# Patient Record
Sex: MALE | Race: WHITE | NOT HISPANIC OR LATINO | Employment: OTHER | URBAN - NONMETROPOLITAN AREA
[De-identification: names, ages, dates, MRNs, and addresses within clinical notes are randomized per-mention and may not be internally consistent; named-entity substitution may affect disease eponyms.]

---

## 2017-03-27 ENCOUNTER — HISTORY (OUTPATIENT)
Dept: FAMILY MEDICINE | Facility: OTHER | Age: 57
End: 2017-03-27

## 2017-03-27 ENCOUNTER — OFFICE VISIT - GICH (OUTPATIENT)
Dept: FAMILY MEDICINE | Facility: OTHER | Age: 57
End: 2017-03-27

## 2017-03-27 DIAGNOSIS — L91.8 OTHER HYPERTROPHIC DISORDERS OF THE SKIN: ICD-10-CM

## 2018-01-04 NOTE — PROGRESS NOTES
"Patient Information     Patient Name MRN Terry Burgos [id number] Male 1960      Progress Notes by Lambert Salcedo MD at 3/27/2017 11:15 AM     Author:  Lambert Salcedo MD Service:  (none) Author Type:  Physician     Filed:  3/27/2017 12:28 PM Encounter Date:  3/27/2017 Status:  Signed     :  Lambert Salcedo MD (Physician)            SUBJECTIVE:  56 y.o. male who presents for removal of the skin tag on his face. It's just below the left eye and it gets in the way of his vision when he gazes downward. I had seen him socially and he asked to come in and have it removed.    Additional Review of Systems: see HPI:      Past Medical History     Diagnosis  Date     ED (erectile dysfunction) 2013     Hyperlipemia 2013        No current outpatient prescriptions on file.     No current facility-administered medications for this visit.      Medications have been reviewed by me and are current to the best of my knowledge and ability.      Allergies as of 2017      (No Known Allergies)        OBJECTIVE:  Visit Vitals       /88     Ht 1.702 m (5' 7\")     Wt 82.1 kg (181 lb)     BMI 28.35 kg/m2     EXAM:  EXAM:  General Appearance: Pleasant, alert, appropriate appearance for age. No acute distress  Skin: There is a very small skin tag, about 1 mm at the base, a typical appearing skin tag, just below the left eye in his field of vision with downward gaze.        ASSESSMENT/PLAN:  Skin tag-patient desires removal. Informed consent was obtained. A timeout was done to assure that the appropriate patient and appropriate lesion was noted. The area with central triple prepped with surgical scrub and the skin tag easily snipped off with an iris scissors. The base was cauterized with silver nitrate. It was a typical skin tag and so no pathology was done. He is to keep it clean and dry and follow-up if he has any problems.  Lambert Salcedo MD ....................  3/27/2017   " 11:42 AM

## 2018-01-04 NOTE — NURSING NOTE
Patient Information     Patient Name MRN Sex Terry Huerta [id number] Male 1960      Nursing Note by Jade Cruz at 3/27/2017 11:15 AM     Author:  Jade Cruz Service:  (none) Author Type:  (none)     Filed:  3/27/2017 11:57 AM Encounter Date:  3/27/2017 Status:  Signed     :  Jade Cruz            Patient presents to the clinic today to have a skin tag removed.    Universal Protocol    A. Pre-procedure verification complete yes  1-relevant information / documentation available, reviewed and properly matched to the patient; 2-consent accurate and complete, 3-equipment and supplies available    B. Site marking complete Yes  Site marked if not in continuous attendance with patient    C. TIME OUT completed yes  Time Out was conducted just prior to starting procedure to verify the eight required elements: 1-patient identity, 2-consent accurate and complete, 3-position, 4-correct side/site marked (if applicable), 5-procedure, 6-relevant images / results properly labeled and displayed (if applicable), 7-antibiotics / irrigation fluids (if applicable), 8-safety precautions.     Jade Cruz ....................  3/27/2017   11:18 AM

## 2018-01-24 ENCOUNTER — DOCUMENTATION ONLY (OUTPATIENT)
Dept: FAMILY MEDICINE | Facility: OTHER | Age: 58
End: 2018-01-24

## 2018-01-25 VITALS
WEIGHT: 181 LBS | SYSTOLIC BLOOD PRESSURE: 124 MMHG | BODY MASS INDEX: 28.41 KG/M2 | HEIGHT: 67 IN | DIASTOLIC BLOOD PRESSURE: 88 MMHG

## 2019-03-15 DIAGNOSIS — M25.569 ACUTE KNEE PAIN, UNSPECIFIED LATERALITY: Primary | ICD-10-CM

## 2019-03-18 ENCOUNTER — HOSPITAL ENCOUNTER (OUTPATIENT)
Dept: PHYSICAL THERAPY | Facility: OTHER | Age: 59
Setting detail: THERAPIES SERIES
End: 2019-03-18
Attending: FAMILY MEDICINE
Payer: COMMERCIAL

## 2019-03-18 DIAGNOSIS — M25.569 ACUTE KNEE PAIN, UNSPECIFIED LATERALITY: ICD-10-CM

## 2019-03-18 PROCEDURE — 97033 APP MDLTY 1+IONTPHRSIS EA 15: CPT | Mod: GP | Performed by: PHYSICAL THERAPIST

## 2019-03-18 PROCEDURE — 97161 PT EVAL LOW COMPLEX 20 MIN: CPT | Mod: GP | Performed by: PHYSICAL THERAPIST

## 2019-03-19 ENCOUNTER — HOSPITAL ENCOUNTER (OUTPATIENT)
Dept: PHYSICAL THERAPY | Facility: OTHER | Age: 59
Setting detail: THERAPIES SERIES
End: 2019-03-19
Attending: FAMILY MEDICINE
Payer: COMMERCIAL

## 2019-03-19 PROCEDURE — 97033 APP MDLTY 1+IONTPHRSIS EA 15: CPT | Mod: GP | Performed by: PHYSICAL THERAPIST

## 2019-03-21 NOTE — ADDENDUM NOTE
Encounter addended by: Stephan Beavers, PT on: 3/21/2019 5:36 PM   Actions taken: Sign clinical note, Flowsheet accepted

## 2019-03-21 NOTE — PROGRESS NOTES
03/18/19 1600   General Information   Type of Visit Initial OP Ortho PT Evaluation   Start of Care Date 03/18/19   Referring Physician Dr. Dayne LAWRENCE   Patient/Family Goals Statement Patient is leaving in 3 days for a golf vacation.  Difficulty walking and golfing at this time.  Would like to decrease pain and improve tolerance to be able to enjoy his vacation.   Orders Evaluate and Treat   Orders Comment Iontophoresis   Date of Order 03/15/19   Medical Diagnosis Knee pain   Surgical/Medical history reviewed Yes  (Hx prior Meniscal injury.  Arthroscopy per patient report.  )   Weight-Bearing Status - LLE weight-bearing as tolerated   Weight-Bearing Status - RLE weight-bearing as tolerated   General Information Comments Patient reports slipping on ice in February 2019.  Twisted left knee and fell on his back.  Pain in his left knee since that injury.  Recent flare up of pain.  Patient attempting to increase his activity level in anticipation of doing a lot of walking on vacation.  He is going on a gold vacation and would like to play 36 holes of golf 5 days in a row.  Currently he is having to use a cane to walk first thing in the AM.  Improves slightly with moving around.    Presentation and Etiology   Pertinent history of current problem (include personal factors and/or comorbidities that impact the POC) low   Functional Limitations perform desired leisure / sports activities   Symptom Location Left medial knee - joint line, left medial pes anserine region, left HS insertion.   How/Where did it occur With a fall   Onset date of current episode/exacerbation 02/19/19   Chronicity New   Pain rating (0-10 point scale) Best (/10);Worst (/10)   Best (/10) 2   Worst (/10) 8   Pain quality A. Sharp;C. Aching   Frequency of pain/symptoms A. Constant   Pain/symptoms are: Worse in the morning   Pain/symptoms exacerbated by A. Sitting;B. Walking   Prior Level of Function   Prior Level of Function-Mobility No diff with  basketball, running, golf prior to the injury in February.   Fall Risk Screen   Fall screen completed by PT   Have you fallen 2 or more times in the past year? No   Have you fallen and had an injury in the past year? Yes   Is patient a fall risk? Yes;Department fall risk interventions implemented   Knee Objective Findings   Side (if bilateral, select both right and left) Left   Observation Antalgic gait - mild.  No AD during the day.  First thing in the AM moderate diff walking.     Gait/Locomotion Antalgic   Knee Flexibility Comments HS flexibility WFL   Lachmans Test neg   Anterior Drawer Test neg   Posterior Drawer Test neg   Varus Stress Test neg   Valgus Stress Test mild pain - stable   Rosas's Test mildly positive   Palpation Medial joint line tenderness, mild effusion, moderate pain over MCL and medial HS insertion.   Left Knee Extension PROM full but painful   Left Knee Flexion PROM WFL - tight at end range   Planned Therapy Interventions   Planned Therapy Interventions manual therapy;gait training;joint mobilization;neuromuscular re-education;ROM;strengthening;stretching;balance training   Planned Modality Interventions   Planned Modality Interventions Cryotherapy;Electrical stimulation;Hot packs;Iontophoresis;Ultrasound   Planned Modality Interventions Comments Iontophoresis with Dexamethasone 4%   Clinical Impression   Criteria for Skilled Therapeutic Interventions Met yes, treatment indicated   PT Diagnosis Left knee pain - possible meniscal injury.  Secondary pain/strain MCL and possible hamstring tendonitis.     Functional limitations due to impairments Prolonged sitting with knee in flexed position, prolonged walking, golf.   Clinical Presentation Evolving/Changing   Clinical Presentation Rationale Clinical judgement   Clinical Decision Making (Complexity) Low complexity   Therapy Frequency 2 times/Week   Predicted Duration of Therapy Intervention (days/wks) 4 weeks   Risk & Benefits of therapy  have been explained Yes   Patient, Family & other staff in agreement with plan of care Yes   Ortho Goal 1   Goal Identifier Pain with walking   Goal Description Decrease pain to less then 2/10 walking 60 minutes with exercise   Target Date 04/15/19   Ortho Goal 2   Goal Identifier Pain with golf   Goal Description Return to 18 holes of golf without limitation or pain.   Target Date 04/15/19   Ortho Goal 3   Goal Identifier HEP   Goal Description Indep with HEP with no soreness   Target Date 04/15/19   Total Evaluation Time   PT Eval, Low Complexity Minutes (51226) 30

## 2019-03-22 NOTE — PROGRESS NOTES
[facility]          OUTPATIENT PHYSICAL THERAPY ORTHOPEDIC EVALUATION  PLAN OF TREATMENT FOR OUTPATIENT REHABILITATION  (COMPLETE FOR INITIAL CLAIMS ONLY)  Patient's Last Name, First Name, M.I.  YOB: 1960  Terry Kemp    Provider s Name:  [facility]   Medical Record No.  [id number]   Start of Care Date:  03/18/19   Onset Date:  02/19/19   Type:     _X__PT   ___OT   ___SLP Medical Diagnosis:     Left knee pain   PT Diagnosis:  Left knee pain - possible meniscal injury.  Secondary pain/strain MCL and possible hamstring tendonitis.     Visits from SOC:  1      _________________________________________________________________________________  Plan of Treatment/Functional Goals:  manual therapy, gait training, joint mobilization, neuromuscular re-education, ROM, strengthening, stretching, balance training     Cryotherapy, Electrical stimulation, Hot packs, Iontophoresis, Ultrasound  Iontophoresis with Dexamethasone 4%    Goals  Goal Identifier: Pain with walking  Goal Description: Decrease pain to less then 2/10 walking 60 minutes with exercise  Target Date: 04/15/19    Goal Identifier: Pain with golf  Goal Description: Return to 18 holes of golf without limitation or pain.  Target Date: 04/15/19    Goal Identifier: HEP  Goal Description: Indep with HEP with no soreness  Target Date: 04/15/19                              Therapy Frequency:  2 times/Week  Predicted Duration of Therapy Intervention:  4 weeks    Stephan Beavers, PT                 I CERTIFY THE NEED FOR THESE SERVICES FURNISHED UNDER        THIS PLAN OF TREATMENT AND WHILE UNDER MY CARE     (Physician co-signature of this document indicates review and certification of the therapy plan).                  Referring Provider:  Dr. Dayne LAWRENCE    Initial Assessment        See Epic Evaluation Start of Care Date: 03/18/19

## 2019-03-22 NOTE — ADDENDUM NOTE
Encounter addended by: Stephan Beavers, PT on: 3/22/2019 12:14 PM   Actions taken: Episode edited, Flowsheet data copied forward, Flowsheet accepted, Charge Capture section accepted

## 2019-03-22 NOTE — ADDENDUM NOTE
Encounter addended by: Stephan Beavers, PT on: 3/22/2019 12:17 PM   Actions taken: Sign clinical note, Flowsheet accepted

## 2019-03-28 ENCOUNTER — HOSPITAL ENCOUNTER (OUTPATIENT)
Dept: PHYSICAL THERAPY | Facility: OTHER | Age: 59
Setting detail: THERAPIES SERIES
End: 2019-03-28
Attending: FAMILY MEDICINE
Payer: COMMERCIAL

## 2019-03-28 PROCEDURE — 97110 THERAPEUTIC EXERCISES: CPT | Mod: GP | Performed by: PHYSICAL THERAPIST

## 2019-03-28 PROCEDURE — 97035 APP MDLTY 1+ULTRASOUND EA 15: CPT | Mod: GP | Performed by: PHYSICAL THERAPIST

## 2019-03-28 PROCEDURE — 97033 APP MDLTY 1+IONTPHRSIS EA 15: CPT | Mod: GP | Performed by: PHYSICAL THERAPIST

## 2019-03-28 NOTE — ADDENDUM NOTE
Encounter addended by: Stephan Beavers, PT on: 3/28/2019 9:04 AM   Actions taken: Charge Capture section accepted

## 2019-04-01 ENCOUNTER — HOSPITAL ENCOUNTER (OUTPATIENT)
Dept: PHYSICAL THERAPY | Facility: OTHER | Age: 59
Setting detail: THERAPIES SERIES
End: 2019-04-01
Attending: FAMILY MEDICINE
Payer: COMMERCIAL

## 2019-04-01 PROCEDURE — 97033 APP MDLTY 1+IONTPHRSIS EA 15: CPT | Mod: GP | Performed by: PHYSICAL THERAPIST

## 2019-04-01 PROCEDURE — 97035 APP MDLTY 1+ULTRASOUND EA 15: CPT | Mod: GP | Performed by: PHYSICAL THERAPIST

## 2019-04-08 ENCOUNTER — TELEPHONE (OUTPATIENT)
Dept: FAMILY MEDICINE | Facility: OTHER | Age: 59
End: 2019-04-08

## 2019-04-08 ENCOUNTER — HOSPITAL ENCOUNTER (OUTPATIENT)
Dept: PHYSICAL THERAPY | Facility: OTHER | Age: 59
Setting detail: THERAPIES SERIES
End: 2019-04-08
Attending: FAMILY MEDICINE
Payer: COMMERCIAL

## 2019-04-08 DIAGNOSIS — M25.562 CHRONIC PAIN OF LEFT KNEE: Primary | ICD-10-CM

## 2019-04-08 DIAGNOSIS — G89.29 CHRONIC PAIN OF LEFT KNEE: Primary | ICD-10-CM

## 2019-04-08 PROCEDURE — 97033 APP MDLTY 1+IONTPHRSIS EA 15: CPT | Mod: GP | Performed by: PHYSICAL THERAPIST

## 2019-04-08 PROCEDURE — 97035 APP MDLTY 1+ULTRASOUND EA 15: CPT | Mod: GP | Performed by: PHYSICAL THERAPIST

## 2019-04-08 NOTE — TELEPHONE ENCOUNTER
I have been working with Terry in PT regarding his knee injury.  He is not progressing and would like to see you for an evaluation.  He would like to move forward with a MRI if you feel it is appropriate.  He is a very active individual who typically plays golf daily in the summer.  He is concerned he will not be able to play golf, run or play basketball this summer.      I have an updated note in his chart for your reference.  Let me know if you have questions.    Stephan Beavers PT

## 2019-04-08 NOTE — PROGRESS NOTES
Outpatient Physical Therapy Progress Note     Patient: Terry Kemp  : 1960    Beginning/End Dates of Reporting Period:  3/18/19 to 2019     Referring Provider: Dr. Dayne LAWRENCE    Therapy Diagnosis: Left knee pain, possible meniscal injury.       Client Self Report: Patient reports doing a lot of yardwork this past weekend and today has moderate to severe pain in the medial left knee.  Mild swelling observed.  Patient did have relief with PT treatment last week, but he reports his pain is now back to baseline.  He would like to pursue a MRI to find out if he has a cartilage tear.  He does have a mechanism of injury where he attempted to stand from a squat position and felt/heard a pop.  Original injury was during the winter when he slipped and fell twisting his knee.      Objective Measurements:     Difficulty fully extending knee when supine.  Lacks 5 degrees extension.  Mild loss of end range flexion.   Difficulty tolerating strengthening due to exacerbation of pain       Goals:  Goal Identifier Pain with walking   Goal Description Decrease pain to less then 2/10 walking 60 minutes with exercise   Target Date 04/15/19   Date Met      Progress: Not progressing     Goal Identifier Pain with golf   Goal Description Return to 18 holes of golf without limitation or pain.   Target Date 04/15/19   Date Met      Progress: Not progressing     Goal Identifier HEP   Goal Description Indep with HEP with no soreness   Target Date 04/15/19   Date Met      Progress: Not progressing         Progress Toward Goals:   Progress this reporting period: Patient not progressing with recent exacerbation of pain.        Plan:  Continue therapy per current plan of care.    Discharge:  No

## 2019-04-09 NOTE — TELEPHONE ENCOUNTER
I ordered an MRI of his knee.  Lets plan on seeing him a couple days after his MRI to reexamine him and review his MRI results.

## 2019-04-11 ENCOUNTER — HOSPITAL ENCOUNTER (OUTPATIENT)
Dept: PHYSICAL THERAPY | Facility: OTHER | Age: 59
Setting detail: THERAPIES SERIES
End: 2019-04-11
Attending: PSYCHIATRY & NEUROLOGY
Payer: COMMERCIAL

## 2019-04-11 PROCEDURE — 97033 APP MDLTY 1+IONTPHRSIS EA 15: CPT | Mod: GP | Performed by: PHYSICAL THERAPIST

## 2019-04-11 PROCEDURE — 97035 APP MDLTY 1+ULTRASOUND EA 15: CPT | Mod: GP | Performed by: PHYSICAL THERAPIST

## 2019-04-24 ENCOUNTER — TELEPHONE (OUTPATIENT)
Dept: FAMILY MEDICINE | Facility: OTHER | Age: 59
End: 2019-04-24

## 2019-04-24 NOTE — TELEPHONE ENCOUNTER
Patient has questions regarding scheduling an MRI. Please call and explain the procedure required to get an xray and MRI placed. Patient not sure if insurance will cover.     Tania Banda on 4/24/2019 at 2:28 PM

## 2019-04-24 NOTE — TELEPHONE ENCOUNTER
Yes, he needs to have a visit with a provider. An XRAY can be done at the time of that visit. Since he is already doing physical therapy, presumably insurance will then cover an MRI.     It looks like Dr. Montelongo ordered an MRI of his knee already. He sees a lot of sports related injuries so would be a good physician for him to see for this problem.     The most efficient use of his time would be to do this:    Get an appointment this week or next with an available provider (NP or PA would be appropriate). XR can be ordered and done at that visit.     MRI could then be scheduled. Would recommend scheduling a follow up appointment with Dr. Montelongo to review these results.

## 2019-04-24 NOTE — TELEPHONE ENCOUNTER
Spoke to patient.  He states that he has been dealing with soft tissue damage of his left knee and would like an MRI for that.  He states that insurance won't cover it unless he appeals it with physical therapy and an x-ray.  He states for the appeal he would need to document evidence of an x-ray and physical therapy.  He states that he has never seen Leah Oscar MD before and patient wonders if he should schedule an appointment for his knee pain before he deals with the x-ray, MRI and insurance.  Priya Preciado LPN 4/24/2019   2:51 PM

## 2019-04-24 NOTE — TELEPHONE ENCOUNTER
Patient notified of this and transferred to scheduling.  He states that he was told that MRI was not covered by insurance.  Spoke with [employer] and they will re-run the authorization as the previous one did not include patient's physical therapy.  They will contact patient for this.  SEPIDEH Olivia........................4/24/2019  3:57 PM

## 2019-05-08 ENCOUNTER — NURSE TRIAGE (OUTPATIENT)
Dept: FAMILY MEDICINE | Facility: OTHER | Age: 59
End: 2019-05-08
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
## 2019-05-08 RX ORDER — DOXYCYCLINE HYCLATE 100 MG
200 TABLET ORAL ONCE
Qty: 2 TABLET | Refills: 0 | Status: SHIPPED | OUTPATIENT
Start: 2019-05-08 | End: 2019-05-29

## 2019-05-08 NOTE — TELEPHONE ENCOUNTER
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    Pt states he received first tick bite on left side of chest about 10 days ago and states that he was not able to get all pieces of the tick removed as was embedded.  Pt states this bite is healing with no redness present.  Pt noted he attempted to pick off another suspected deer tick from his right bicep area this morning and believes he was not able to remove the tick in its entirety.  Pt believes both ticks were deer ticks.      Pt denies fever, headache, rash, redness, drainage.      Pt confirmed no known allergies and states he would use [facility] if needed.    Will route to PCP to determine if should/can treat prophylactically with antibiotics.     Reason for Disposition    Can't remove tick's head that was broken off in the skin (after trying Care Advice)    Protocols used: TICK BITE-MARCIA-    Candy Cordero RN  ....................  5/8/2019   12:38 PM

## 2019-05-08 NOTE — TELEPHONE ENCOUNTER
Pt states that he has had 2 deer tick embedded in his arm in the last 2 days.  He would like to get a prescription.

## 2019-05-08 NOTE — TELEPHONE ENCOUNTER
Would recommend a 1 time prophylactic dose of doxycycline. Monitor for sx. Usually sx occur within 10-30 days. Monitor for rash, fever, joint pain. If these occurs, pt needs to be seen. Prescription sent to pharmacy. Leah Oscar MD

## 2019-05-29 ENCOUNTER — OFFICE VISIT (OUTPATIENT)
Dept: FAMILY MEDICINE | Facility: OTHER | Age: 59
End: 2019-05-29
Attending: FAMILY MEDICINE
Payer: COMMERCIAL

## 2019-05-29 ENCOUNTER — HOSPITAL ENCOUNTER (OUTPATIENT)
Dept: GENERAL RADIOLOGY | Facility: OTHER | Age: 59
Discharge: HOME | End: 2019-05-29
Attending: FAMILY MEDICINE | Admitting: FAMILY MEDICINE
Payer: COMMERCIAL

## 2019-05-29 VITALS
DIASTOLIC BLOOD PRESSURE: 74 MMHG | WEIGHT: 172.2 LBS | HEIGHT: 67 IN | HEART RATE: 72 BPM | TEMPERATURE: 96.6 F | RESPIRATION RATE: 16 BRPM | SYSTOLIC BLOOD PRESSURE: 126 MMHG | BODY MASS INDEX: 27.03 KG/M2

## 2019-05-29 DIAGNOSIS — M25.562 ACUTE PAIN OF LEFT KNEE: Primary | ICD-10-CM

## 2019-05-29 DIAGNOSIS — M25.562 ACUTE PAIN OF LEFT KNEE: ICD-10-CM

## 2019-05-29 DIAGNOSIS — Z00.00 HEALTH CARE MAINTENANCE: ICD-10-CM

## 2019-05-29 LAB
CHOLEST SERPL-MCNC: 260 MG/DL
GLUCOSE SERPL-MCNC: 83 MG/DL (ref 70–105)
HDLC SERPL-MCNC: 61 MG/DL (ref 23–92)
LDLC SERPL CALC-MCNC: 191 MG/DL
NONHDLC SERPL-MCNC: 199 MG/DL
PSA SERPL-ACNC: 1.4 NG/ML
TRIGL SERPL-MCNC: 41 MG/DL

## 2019-05-29 PROCEDURE — 80061 LIPID PANEL: CPT | Mod: ZL | Performed by: FAMILY MEDICINE

## 2019-05-29 PROCEDURE — 99396 PREV VISIT EST AGE 40-64: CPT | Performed by: FAMILY MEDICINE

## 2019-05-29 PROCEDURE — G0103 PSA SCREENING: HCPCS | Mod: ZL | Performed by: FAMILY MEDICINE

## 2019-05-29 PROCEDURE — 73562 X-RAY EXAM OF KNEE 3: CPT | Mod: LT

## 2019-05-29 PROCEDURE — 36415 COLL VENOUS BLD VENIPUNCTURE: CPT | Mod: ZL | Performed by: FAMILY MEDICINE

## 2019-05-29 PROCEDURE — 82947 ASSAY GLUCOSE BLOOD QUANT: CPT | Mod: ZL | Performed by: FAMILY MEDICINE

## 2019-05-29 ASSESSMENT — MIFFLIN-ST. JEOR: SCORE: 1549.72

## 2019-05-29 NOTE — NURSING NOTE
"Patient here for yearly physical.   Zhanna Frost LPN ..........5/29/2019 1:48 PM   Chief Complaint   Patient presents with     Physical     yearly       Initial /74 (BP Location: Right arm, Patient Position: Sitting, Cuff Size: Adult Regular)   Pulse 72   Temp 96.6  F (35.9  C) (Tympanic)   Resp 16   Ht 1.702 m (5' 7\")   Wt 78.1 kg (172 lb 3.2 oz)   BMI 26.97 kg/m   Estimated body mass index is 26.97 kg/m  as calculated from the following:    Height as of this encounter: 1.702 m (5' 7\").    Weight as of this encounter: 78.1 kg (172 lb 3.2 oz).  Medication Reconciliation: complete    Zhanna Frost LPN    "

## 2019-05-29 NOTE — PATIENT INSTRUCTIONS
Will get your name on the waiting list for shingrix.   Labs today, will send a letter with results.   Call your insurance to find out if Cologuard is covered. Let us know and we will send in the paperwork.   MRI knee planned with ortho consult. (do consult AFTER MRI results available)  If surgery needed, plan for in fall. Consider a steroid injection.

## 2019-05-29 NOTE — PROGRESS NOTES
SUBJECTIVE:   Terry Kemp is a 58 year old male who presents to clinic today to establish care and for a physical.    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  He slipped and fell on the ice, not specifically hurting his knee.  But 2 days later he developed bilateral knee pain.  Eventually his right knee improved, but he continued to have severe left knee pain.  Because he was favoring this knee, he thinks he reinjured his right knee.  He has been doing physical therapy with some improvement.  But he had gotten to the point of wanting to do an MRI of his left knee.  However, there were insurance issues and this MRI has not yet been done.    He is unwilling to do surgery if indicated during the summer months.  But does not feel that he is going to get much more improvement with physical therapy.  He is an avid golfer, and very active.  He is anxious to be able to return to his normal activity.      Colonoscopy in 2009, no first-degree relatives with colon cancer or precancerous polyps.    Patient has not previously had labs done through the clinic.  He did have lipids drawn in 2012 for life insurance policy.  No previous PSA testing.    Patient Active Problem List    Diagnosis Date Noted     Hyperlipemia 06/11/2013     Priority: Medium     Synovial cyst 06/11/2013     Priority: Medium     Past Medical History:   Diagnosis Date     Hyperlipidemia     6/11/2013     Male erectile dysfunction     6/11/2013      Past Surgical History:   Procedure Laterality Date     ARTHROSCOPY SHOULDER      rt shoulder x two     COLONOSCOPY      2009,normal     EXCISE CYST GENERIC (LOCATION)      7/8/13,mucous cyst excision, right index finger. Dr. Bajwa     KNEE SURGERY      arthroscopy       OTHER SURGICAL HISTORY      7/8/13,37597.0,GA EXC SKIN MALIG < 0.5 CM FACE EARS EYELIDS,Serleth, left eye     Family History   Problem Relation Age of Onset     Coronary Artery Disease Mother       "Lung Cancer Father         smoker     No Known Problems Sister      No Known Problems Brother      Colitis Half-Brother         colostomy bag     Social History     Tobacco Use     Smoking status: Never Smoker     Smokeless tobacco: Never Used   Substance Use Topics     Alcohol use: Yes     Alcohol/week: 0.0 oz     Social History     Social History Narrative     from [place]. In a long term relationship with Aisha Khan.     Son: Jesus.     Daughter: Abeba,  due to suicide.     Granddaughter born 2018.     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     Active in suicide prevention efforts.          Review of Systems see HPI, ROS otherwise negative.     OBJECTIVE:     /74 (BP Location: Right arm, Patient Position: Sitting, Cuff Size: Adult Regular)   Pulse 72   Temp 96.6  F (35.9  C) (Tympanic)   Resp 16   Ht 1.702 m (5' 7\")   Wt 78.1 kg (172 lb 3.2 oz)   BMI 26.97 kg/m    Body mass index is 26.97 kg/m .  Physical Exam   Constitutional: He is oriented to person, place, and time. He appears well-developed and well-nourished.   HENT:   TMs appear normal.    Eyes: Conjunctivae are normal.   Neck: No thyromegaly present.   Cardiovascular: Normal rate, regular rhythm and normal heart sounds.   No murmur heard.  Pulmonary/Chest: Effort normal and breath sounds normal. No respiratory distress.   Abdominal: Soft.   Musculoskeletal: He exhibits no edema.   Lymphadenopathy:     He has no cervical adenopathy.   Neurological: He is alert and oriented to person, place, and time.   Skin: No rash noted.   Psychiatric: He has a normal mood and affect.         ASSESSMENT/PLAN:         ICD-10-CM    1. Acute pain of left knee M25.562 XR Knee Left 3 Views     ORTHOPEDICS ADULT REFERRAL   2. Health care maintenance Z00.00 Lipid Panel     PSA Screen GH     Glucose     Glucose     PSA Screen GH     Lipid Panel     Labs today.  Cologuard paperwork signed.  Patient will look into insurance coverage.    Relevant " cancer screening discussed.    Counseled on healthy diet, Calcium and vitamin D intake, and exercise.    Patient Instructions   Will get your name on the waiting list for shingrix.   Labs today, will send a letter with results.   Call your insurance to find out if Cologuard is covered. Let us know and we will send in the paperwork.   MRI knee planned with ortho consult. (do consult AFTER MRI results available)  If surgery needed, plan for in fall. Consider a steroid injection.         Leah Oscar MD  [facility]

## 2019-05-29 NOTE — LETTER
May 31, 2019      Terry Hairuer  [address] [facility] [address]        Dear Terry,     Please see the copy of your lab results below:    Resulted Orders   Glucose   Result Value Ref Range    Glucose 83 70 - 105 mg/dL   PSA Screen GH   Result Value Ref Range    PSA Screen 1.401 <3.100 ng/mL   Lipid Panel   Result Value Ref Range    Cholesterol 260 (H) <200 mg/dL    Triglycerides 41 <150 mg/dL    HDL Cholesterol 61 23 - 92 mg/dL    LDL Cholesterol Calculated 191 (H) <100 mg/dL      Comment:      Above desirable:  100-129 mg/dl  Borderline High:  130-159 mg/dL  High:             160-189 mg/dL  Very high:       >189 mg/dl      Non HDL Cholesterol 199 (H) <130 mg/dL      Comment:      Above Desirable:  130-159 mg/dl  Borderline high:  160-189 mg/dl  High:             190-219 mg/dl  Very high:       >219 mg/dl       Your cholesterol is approaching where we would typically recommend starting a cholesterol lowering medication to decrease your risk of a heart attack or stroke. There are always dietary changes that could be made to help address cholesterol. In general, eating more of a plant based diet can be very helpful.     The CHIP (coronary health improvement project) Program is a locally offered series of classes that some of my patients have found very helpful in guiding these lifestyle changes.     If you would like to start a statin medication, let me know and I will send an prescription into your pharmacy. Otherwise, we should plan on repeating your cholesterol in a year.       The knee xray does show arthritis, but nothing else to explain your current symptoms. We will wait for the MRI to give us more information.     Please don't hesitate to call if you have concerns or questions.       Sincerely,        Leah Oscar MD

## 2019-06-07 ENCOUNTER — TELEPHONE (OUTPATIENT)
Dept: FAMILY MEDICINE | Facility: OTHER | Age: 59
End: 2019-06-07

## 2019-06-07 DIAGNOSIS — E78.2 MIXED HYPERLIPIDEMIA: Primary | ICD-10-CM

## 2019-06-07 NOTE — TELEPHONE ENCOUNTER
Left message MD is out of the clinic today, will forward message to MD for Monday.   Zhanna Frost LPN ..........6/7/2019 11:29 AM

## 2019-06-10 RX ORDER — ATORVASTATIN CALCIUM 20 MG/1
20 TABLET, FILM COATED ORAL DAILY
Qty: 30 TABLET | Refills: 3 | Status: SHIPPED | OUTPATIENT
Start: 2019-06-10 | End: 2019-10-11

## 2019-06-10 NOTE — TELEPHONE ENCOUNTER
Please contact the patient. Prescription sent to [facility]. He should have labs rechecked in 6 weeks, fasting.  He can schedule a lab only appointment.  Ideally, he may need to be on a higher dose of Lipitor.  But I generally like to start with a smaller dose.  Once we settle on a dose, I will switch it to a 90-day supply.

## 2019-06-10 NOTE — TELEPHONE ENCOUNTER
Patient contacted and informed of prescription and follow up labs. Patient verbalized understanding and states that he will call back to schedule lab only appointment.     Jessica Parmar LPN on 6/10/2019 at 9:18 AM

## 2019-06-18 ENCOUNTER — ALLIED HEALTH/NURSE VISIT (OUTPATIENT)
Dept: FAMILY MEDICINE | Facility: OTHER | Age: 59
End: 2019-06-18
Attending: FAMILY MEDICINE
Payer: COMMERCIAL

## 2019-06-18 DIAGNOSIS — Z23 NEED FOR VACCINATION: Primary | ICD-10-CM

## 2019-06-18 PROCEDURE — 90471 IMMUNIZATION ADMIN: CPT

## 2019-06-18 PROCEDURE — 90750 HZV VACC RECOMBINANT IM: CPT

## 2019-06-18 NOTE — PROGRESS NOTES
Pt denies allergies to yeast gelatin neosporin eggs thimerasol or latex or past reactions to vaccinations. Verified name and date of birth  Copy of MIIC given. Pt instructed to wait 15 min post injection in lobby and to report any reactions to nursing.  Authorized through [facility] for receiving in the clinic setting .   Zhanna Wood RN on 6/18/2019 at 10:24 AM

## 2019-06-25 ENCOUNTER — OFFICE VISIT (OUTPATIENT)
Dept: ORTHOPEDICS | Facility: OTHER | Age: 59
End: 2019-06-25
Attending: FAMILY MEDICINE
Payer: COMMERCIAL

## 2019-06-25 DIAGNOSIS — M25.562 ACUTE PAIN OF LEFT KNEE: ICD-10-CM

## 2019-06-25 PROCEDURE — G0463 HOSPITAL OUTPT CLINIC VISIT: HCPCS | Performed by: ORTHOPAEDIC SURGERY

## 2019-07-01 NOTE — PROGRESS NOTES
VITALS:   Height:   67  Weight:   172  Pulse rate:  72  Blood Pressure:  126 / 74      CHIEF COMPLAINT: Bilateral Knee Pain     PROBLEMS:  FH LUNG CANCER (ICD-V16.1) ([id number])    PATIENT REPORTED MEDICATIONS:  LIPITOR TABLET (ATORVASTATIN CALCIUM TABS)     Medications were reviewed with patient this visit.    PATIENT REPORTED ALLERGIES:   Patient has no noted allergies.      HISTORY OF PRESENT ILLNESS:    REASON FOR EVALUATION:  Bilateral knee pain.     HISTORY OF PRESENT ILLNESS:  Terry comes in back in January he had slipped, kind of hyperextended his knee.  Since that time has had pain that was just not relenting here for the past five months or so.  He is actually starting to feel better at this time.  The left knee is a little bit more bothersome than the right.  Pain is centrally in nature, as well as across the medial patellofemoral ligament.  Also a little bit of discomfort in the back.  Had x-rays done, no significant advancement of arthritic changes were noted.  He has had prior scope done by myself with reasonable success.  This does not feel as much meniscal related at this point in time.  Does have a little bit of pain with twisting and rotation, but does seem like it is feeling better.  He is avid golfer and golfing has not seemed to have been too much of a bother for him.  Just wanted to have things evaluated.      PAST MEDICAL HISTORY:  The patient's health history form dated 06/25/19 was reviewed and signed.  Past medical history, medications, allergies, surgical history, social history, family history, and review of systems noted and scanned into EMR.  ALLERGIES:  No known medication allergies.     PAST MEDICAL HISTORY:    Unremarkable    PAST ORTHOPEDIC SURGICAL HISTORY:    Left Knee Arthroscopy, Meniscectomy, Chondroplasty 04/01/13  Right Shoulder Surgery x2    PAST SURGICAL HISTORY:    Reviewed     FAMILY HISTORY:     Heart Disease  FH Lung Cancer    SOCIAL HISTORY:   Single  Patient has  "never smoked.   Passive smoke exposure - no  Alcohol Use - yes  Drug Use - no  HIV/High Risk - no    PHYSICAL EXAMINATION:    On physical exam, the patient's height 5'7\", weight 172 pounds, pulse 72, blood pressure 126/74.  He is alert and oriented x3, cooperative on my exam, in no acute distress.  Does ambulate with some mild gait antalgia.  Affect is appropriate.  Examination of both knees shows range of motion 0-125.  Tenderness across the medial joint line to some extent.  He is not excessively painful with Rosas testing.  Lachman testing is stable on both knees at this point, as well.  Kneecap tracking is acceptable, as well.  Quad strength is otherwise 5/5.  Scope incisions from prior left knee operation are well-healed at this point in time.  No associated pain with hip range of motion or straight leg raise otherwise.      X-RAY:  X-rays are reviewed, mild arthritic changes are seen medial compartment, as well as patellofemoral joint.      ASSESSMENT:    IMPRESSION:  Bilateral knee strain status post fall with mild underlying arthrosis.     PLAN:   The patient was reassured.  Clinical exam certainly looks quite sound.  I do not suspect any meniscal pathology.  At this point I would advise he does continue to work through quad exercise regimen especially VMOs.  If symptoms do not rob consideration for MRI evaluation of the knee that is effecting him the most would certainly be warranted and considered.      Dictated by:  Rhett Stevens MD  Copy to:  Lambert Salcedo MD     D:  06/25/19  T:  06/27/19    Typed and/or reviewed and corrected by signing  below, and sent to the Physician for final review and signature.      This report was created using voice recording software and computer-generated templates. Although every effort has been made to review for and eliminate errors, some errors may still occur.         Electronically signed by Verito Dowell on 06/28/2019 at " 4:36 PM    Electronically signed by Rhett Stevens MD on 07/01/2019 at 8:32 AM  ________________________________________________________________________

## 2019-07-15 ENCOUNTER — HOSPITAL ENCOUNTER (OUTPATIENT)
Dept: MRI IMAGING | Facility: OTHER | Age: 59
Discharge: HOME | End: 2019-07-15
Attending: FAMILY MEDICINE | Admitting: FAMILY MEDICINE
Payer: COMMERCIAL

## 2019-07-15 ENCOUNTER — TELEPHONE (OUTPATIENT)
Dept: FAMILY MEDICINE | Facility: OTHER | Age: 59
End: 2019-07-15

## 2019-07-15 DIAGNOSIS — M25.562 CHRONIC PAIN OF LEFT KNEE: ICD-10-CM

## 2019-07-15 DIAGNOSIS — G89.29 CHRONIC PAIN OF LEFT KNEE: ICD-10-CM

## 2019-07-15 PROCEDURE — 73721 MRI JNT OF LWR EXTRE W/O DYE: CPT | Mod: LT

## 2019-07-22 ENCOUNTER — TELEPHONE (OUTPATIENT)
Dept: FAMILY MEDICINE | Facility: OTHER | Age: 59
End: 2019-07-22

## 2019-07-22 NOTE — TELEPHONE ENCOUNTER
Wondering about his MRI results. Would like to know what they mean. Reginald received the results but just said to follow up with Dr Stevens.  Hilda had wanted Patient to wait on the MRI but due to an insurance approval he needed to do it sooner.   Was wondering about getting an injection vs Surgery. States is having pain however doesn't want surgery until the fall due to golfing.  However the pain is enough for him to considerate an injection to help make it to the fall.   Zhanna Frost LPN ..........7/22/2019 2:10 PM

## 2019-07-22 NOTE — TELEPHONE ENCOUNTER
He has a lot of things going on with his MRI. 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 Progression of arthritis. Possible cyst or ganglia in the area of the back part of the meniscus.     I think he should still meet with janette now to go over the details of the MRI and discuss whether surgery is needed. He can still delay the surgery until the fall. They can discuss an injection. But sometimes they don't want to inject because it can impact the timing of surgery.

## 2019-08-06 ENCOUNTER — OFFICE VISIT (OUTPATIENT)
Dept: ORTHOPEDICS | Facility: OTHER | Age: 59
End: 2019-08-06
Attending: ORTHOPAEDIC SURGERY
Payer: COMMERCIAL

## 2019-08-06 DIAGNOSIS — Z00.00 ROUTINE GENERAL MEDICAL EXAMINATION AT A HEALTH CARE FACILITY: Primary | ICD-10-CM

## 2019-08-06 PROCEDURE — G0463 HOSPITAL OUTPT CLINIC VISIT: HCPCS | Performed by: ORTHOPAEDIC SURGERY

## 2019-08-12 NOTE — PROGRESS NOTES
CHIEF COMPLAINT: Left Knee Pain Recheck     PROBLEMS:  FH LUNG CANCER (ICD-V16.1) (ERC23-C14.1)    PATIENT REPORTED MEDICATIONS:  LIPITOR TABLET (ATORVASTATIN CALCIUM TABS)     PATIENT REPORTED ALLERGIES:   Patient has no noted allergies.      HISTORY OF PRESENT ILLNESS:    REASON FOR EVALUATION:  Left knee pain.    HISTORY OF PRESENT ILLNESS:  Terry comes back in regard to his left knee MRI results.  MRI has been reviewed, shows degenerative meniscal tearing, in addition to underlying chondromalacia of a moderate nature.  Reports his knee is doing pretty at this point in time.  Did want to look at some simple options here today and then if things do not improve, then look at some more definitive management options down the road.      PAST MEDICAL HISTORY:    Unremarkable    PAST ORTHOPEDIC SURGICAL HISTORY:    Left Knee Arthroscopy, Meniscectomy, Chondroplasty 04/01/13  Right Shoulder Surgery x2    PAST SURGICAL HISTORY:    Reviewed     FAMILY HISTORY:     Heart Disease  FH Lung Cancer    SOCIAL HISTORY:   Single  Patient has never smoked.   Passive smoke exposure - no  Alcohol Use - yes  Drug Use - no  HIV/High Risk - no    PHYSICAL EXAMINATION:    Examination of the patient's knee demonstrates tenderness on the left knee on the medial side. Mild swelling is seen there, as well.  Neurovascular examination is otherwise intact.      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     PROCEDURES:   Risks and benefits of the procedure were reviewed with the patient. Informed consent was obtained. Blood sugar risks for our diabetic patients were also discussed.    After achieving informed consent and sterile preparation, the patient's left knee is injected with 2 cc 1% lidocaine and 40 mg Kenalog under sterile conditions.  The patient did tolerate the procedure well.      PLAN:   Injection as stated above.  If incomplete resolution is seen certainly consideration  for arthroscopy and partial meniscectomy would be warranted as our next course in the step of management.    Dictated by:  Rhett Stevens MD  Copy to:  Leah Oscar MD     D:  08/06/19  T:  08/09/19    Typed and/or reviewed and corrected by signing  below, and sent to the Physician for final review and signature.      This report was created using voice recording software and computer-generated templates. Although every effort has been made to review for and eliminate errors, some errors may still occur.         Electronically signed by Verito Dowell on 08/09/2019 at 12:02 PM    Electronically signed by Rhett Stevens MD on 08/11/2019 at 9:29 AM  ________________________________________________________________________

## 2019-09-27 ENCOUNTER — MEDICAL CORRESPONDENCE (OUTPATIENT)
Dept: HEALTH INFORMATION MANAGEMENT | Facility: OTHER | Age: 59
End: 2019-09-27

## 2019-09-27 ENCOUNTER — TELEPHONE (OUTPATIENT)
Dept: FAMILY MEDICINE | Facility: OTHER | Age: 59
End: 2019-09-27

## 2019-09-27 DIAGNOSIS — M25.562 ACUTE PAIN OF LEFT KNEE: Primary | ICD-10-CM

## 2019-09-27 NOTE — TELEPHONE ENCOUNTER
Patient had a cortisone shot in his left knee and he now wants to move on to having surgery.  He would like to have Dr Stevens do the surgery. Looking for a referral for this.     HE is interested in doing the Cologuard too.

## 2019-09-27 NOTE — TELEPHONE ENCOUNTER
It looks like he saw Dr. Stevens for a consult last month and the option of surgery was discussed t that time. It is unclear from that note if he needs to be seen again in clinic or if surgery can just be scheduled. I will have our schedulers contact Dr. Stevens's clinic to inquire and someone should be calling him next week to schedule something (clinic follow up or surgery.)    Please have him stop by to sign cologuard paperwork. AET

## 2019-10-01 ENCOUNTER — OFFICE VISIT (OUTPATIENT)
Dept: ORTHOPEDICS | Facility: OTHER | Age: 59
End: 2019-10-01
Attending: ORTHOPAEDIC SURGERY
Payer: COMMERCIAL

## 2019-10-01 DIAGNOSIS — Z00.00 ROUTINE GENERAL MEDICAL EXAMINATION AT A HEALTH CARE FACILITY: Primary | ICD-10-CM

## 2019-10-01 PROCEDURE — G0463 HOSPITAL OUTPT CLINIC VISIT: HCPCS

## 2019-10-07 NOTE — PROGRESS NOTES
CHIEF COMPLAINT: Left Knee Pain Recheck     PROBLEMS:  FH LUNG CANCER (ICD-V16.1) (PNQ39-Q90.1)    PATIENT REPORTED MEDICATIONS:  LIPITOR TABLET (ATORVASTATIN CALCIUM TABS)     PATIENT REPORTED ALLERGIES:   Patient has no noted allergies.      HISTORY OF PRESENT ILLNESS:    REASON FOR EVALUATION:  Left knee pain.    HISTORY OF PRESENT ILLNESS:  Terry comes in with regard to his left knee.  He has degenerative meniscal tearing.  Reports a continuation of symptomology.  Injection helped a little bit, but he is now wishing to move forward with arthroscopic intervention.  His knee is hurting quite a bit, would like to have this done as soon as possible.     PAST MEDICAL HISTORY:    Unremarkable    PAST ORTHOPEDIC SURGICAL HISTORY:    Left Knee Arthroscopy, Meniscectomy, Chondroplasty 04/01/13  Right Shoulder Surgery x2    PAST SURGICAL HISTORY:    Reviewed     FAMILY HISTORY:     Heart Disease  FH Lung Cancer    SOCIAL HISTORY:   Single  Patient has never smoked.   Passive smoke exposure - no  Alcohol Use - yes  Drug Use - no  HIV/High Risk - no    PHYSICAL EXAMINATION:    Examination of the patient's knee shows tenderness across the medial joint line.  Pain with Rosas testing.  Ligament examination is stable and balanced.  Range of motion 0-125.  Trace effusion is otherwise seen in the knee, itself.      ASSESSMENT:    IMPRESSION:  Symptomatic left knee medial meniscal tearing.      PLAN:   At this time advise moving forward with surgery.  He is interested in having this done sooner than later,  would like to go to [place] given some of the time constraints.  We are going to look at [facility] potentially next Wednesday for a left knee scope, partial meniscectomy and chondroplasty.  If timing can work for [place] certainly okay with that, as well.  I will have Alix work on coordinating with him in this respect.      Dictated by:  Rhett Stevens MD  Copy to:  Leah Oscar MD     D:  10/01/19    T:  10/07/19    Typed and/or reviewed and corrected by signing  below, and sent to the Physician for final review and signature.      This report was created using voice recording software and computer-generated templates. Although every effort has been made to review for and eliminate errors, some errors may still occur.         Electronically signed by Verito Dowell on 10/07/2019 at 11:46 AM    Electronically signed by Rhett Stevens MD on 10/07/2019 at 12:47 PM  ________________________________________________________________________

## 2019-10-11 DIAGNOSIS — E78.2 MIXED HYPERLIPIDEMIA: ICD-10-CM

## 2019-10-11 RX ORDER — ATORVASTATIN CALCIUM 20 MG/1
TABLET, FILM COATED ORAL
Qty: 30 TABLET | Refills: 0 | Status: SHIPPED | OUTPATIENT
Start: 2019-10-11 | End: 2019-11-14

## 2019-10-11 NOTE — TELEPHONE ENCOUNTER
"Requested Prescriptions   Pending Prescriptions Disp Refills     atorvastatin (LIPITOR) 20 MG tablet [Pharmacy Med Name: ATORVASTATIN 20MG TABLETS] 30 tablet 0     Sig: TAKE 1 TABLET(20 MG) BY MOUTH DAILY       Statins Protocol Passed - 10/11/2019  4:06 AM        Passed - LDL on file in past 12 months     Recent Labs   Lab Test 05/29/19  1441   *             Passed - No abnormal creatine kinase in past 12 months     No lab results found.             Passed - Recent (12 mo) or future (30 days) visit within the authorizing provider's specialty     Patient has had an office visit with the authorizing provider or a provider within the authorizing providers department within the previous 12 mos or has a future within next 30 days. See \"Patient Info\" tab in inbasket, or \"Choose Columns\" in Meds & Orders section of the refill encounter.              Passed - Medication is active on med list        Passed - Patient is age 18 or older        lov 05/29/19  Prescription approved per [facility] Refill Protocol. x1 only needs labs rechecked    "

## 2019-10-14 ENCOUNTER — OFFICE VISIT (OUTPATIENT)
Dept: FAMILY MEDICINE | Facility: OTHER | Age: 59
End: 2019-10-14
Attending: FAMILY MEDICINE
Payer: COMMERCIAL

## 2019-10-14 VITALS
HEART RATE: 72 BPM | BODY MASS INDEX: 28.47 KG/M2 | TEMPERATURE: 97.6 F | OXYGEN SATURATION: 98 % | RESPIRATION RATE: 16 BRPM | HEIGHT: 67 IN | WEIGHT: 181.4 LBS | DIASTOLIC BLOOD PRESSURE: 88 MMHG | SYSTOLIC BLOOD PRESSURE: 136 MMHG

## 2019-10-14 DIAGNOSIS — M23.204 OLD PERIPHERAL TEAR OF MEDIAL MENISCUS OF LEFT KNEE: ICD-10-CM

## 2019-10-14 DIAGNOSIS — Z01.818 PRE-OP EXAM: Primary | ICD-10-CM

## 2019-10-14 DIAGNOSIS — E78.00 PURE HYPERCHOLESTEROLEMIA: ICD-10-CM

## 2019-10-14 LAB
ANION GAP SERPL CALCULATED.3IONS-SCNC: 5 MMOL/L (ref 3–14)
BUN SERPL-MCNC: 20 MG/DL (ref 7–25)
CALCIUM SERPL-MCNC: 10 MG/DL (ref 8.6–10.3)
CHLORIDE SERPL-SCNC: 105 MMOL/L (ref 98–107)
CHOLEST SERPL-MCNC: 154 MG/DL
CO2 SERPL-SCNC: 29 MMOL/L (ref 21–31)
CREAT SERPL-MCNC: 1.03 MG/DL (ref 0.7–1.3)
GFR SERPL CREATININE-BSD FRML MDRD: 73 ML/MIN/{1.73_M2}
GLUCOSE SERPL-MCNC: 104 MG/DL (ref 70–105)
HDLC SERPL-MCNC: 54 MG/DL (ref 23–92)
HGB BLD-MCNC: 14.7 G/DL (ref 13.3–17.7)
LDLC SERPL CALC-MCNC: 82 MG/DL
NONHDLC SERPL-MCNC: 100 MG/DL
POTASSIUM SERPL-SCNC: 4.4 MMOL/L (ref 3.5–5.1)
SODIUM SERPL-SCNC: 139 MMOL/L (ref 134–144)
TRIGL SERPL-MCNC: 88 MG/DL

## 2019-10-14 PROCEDURE — 93000 ELECTROCARDIOGRAM COMPLETE: CPT | Performed by: INTERNAL MEDICINE

## 2019-10-14 PROCEDURE — 36415 COLL VENOUS BLD VENIPUNCTURE: CPT | Mod: ZL | Performed by: FAMILY MEDICINE

## 2019-10-14 PROCEDURE — 80048 BASIC METABOLIC PNL TOTAL CA: CPT | Mod: ZL | Performed by: FAMILY MEDICINE

## 2019-10-14 PROCEDURE — 85018 HEMOGLOBIN: CPT | Mod: ZL | Performed by: FAMILY MEDICINE

## 2019-10-14 PROCEDURE — 80061 LIPID PANEL: CPT | Mod: ZL | Performed by: FAMILY MEDICINE

## 2019-10-14 PROCEDURE — 99214 OFFICE O/P EST MOD 30 MIN: CPT | Performed by: FAMILY MEDICINE

## 2019-10-14 ASSESSMENT — PAIN SCALES - GENERAL: PAINLEVEL: MODERATE PAIN (5)

## 2019-10-14 ASSESSMENT — MIFFLIN-ST. JEOR: SCORE: 1578.52

## 2019-10-14 NOTE — NURSING NOTE
"Chief Complaint   Patient presents with     Pre-Op Exam     Left knee scope         Initial /88   Pulse 72   Temp 97.6  F (36.4  C) (Temporal)   Resp 16   Ht 1.689 m (5' 6.5\")   Wt 82.3 kg (181 lb 6.4 oz)   SpO2 98%   BMI 28.84 kg/m   Estimated body mass index is 28.84 kg/m  as calculated from the following:    Height as of this encounter: 1.689 m (5' 6.5\").    Weight as of this encounter: 82.3 kg (181 lb 6.4 oz).    Medication Reconciliation: complete    Date of Surgery: 10/22/19  Type of Surgery: Left knee scope  Surgeon: Dr Stevens  Hospital:  [facility]  Fax:     Fever/Chills or other infectious symptoms in past month: No  >10lb weight loss in past two months: No    Health Care Directive/Code status:  No  Hx of blood transfusions:   No   Td up to date: 12/19/15  History of VRE/MRSA:  No Date:     Preoperative Evaluation: Obstructive Sleep Apnea screening    S:Snore -  Do you snore loudly? (louder than talking or loud enough to be heard through closed doors)No  T: Tired - Do you often feeltired, fatigued, or sleepy during the daytime?No  O: Observed - Has anyone ever observed you stop breathing during your sleep?No  P: Pressure - Do you have or areyou being treated for high blood pressure?No  B: BMI - BMI greater than 35kg/m2?No  A: Age - Age over 50 years old?Yes  N: Neck - Neck circumference greater than 40 cm? 17in  G:Gender - Gender: Male? Male    Total number of \"YES\" responses:  1    Scoring: Low risk of ROYA 0-2  At Risk of ROYA: >3 High Risk ofOSA: 5-8      Norma J. Gosselin, LPN  "

## 2019-10-14 NOTE — LETTER
October 16, 2019      Terry Kemp  [address] [facility] [address]        Dear ,    We are writing to inform you of your test results.    Your test results fall within the expected range(s) or remain unchanged from previous results.  Please continue with current treatment plan.    Resulted Orders   Lipid Panel   Result Value Ref Range    Cholesterol 154 <200 mg/dL    Triglycerides 88 <150 mg/dL    HDL Cholesterol 54 23 - 92 mg/dL    LDL Cholesterol Calculated 82 <100 mg/dL      Comment:      Desirable:       <100 mg/dl    Non HDL Cholesterol 100 <130 mg/dL   Basic Metabolic Panel   Result Value Ref Range    Sodium 139 134 - 144 mmol/L    Potassium 4.4 3.5 - 5.1 mmol/L    Chloride 105 98 - 107 mmol/L    Carbon Dioxide 29 21 - 31 mmol/L    Anion Gap 5 3 - 14 mmol/L    Glucose 104 70 - 105 mg/dL    Urea Nitrogen 20 7 - 25 mg/dL    Creatinine 1.03 0.70 - 1.30 mg/dL    GFR Estimate 73 >60 mL/min/[1.73_m2]    GFR Estimate If Black 89 >60 mL/min/[1.73_m2]    Calcium 10.0 8.6 - 10.3 mg/dL   Hemoglobin   Result Value Ref Range    Hemoglobin 14.7 13.3 - 17.7 g/dL       If you have any questions or concerns, please call the clinic at the number listed above.       Sincerely,        Rafi Corral MD

## 2019-10-14 NOTE — PROGRESS NOTES
"----------------- PREOPERATIVE EXAM ------------------  10/14/2019    SUBJECTIVE:  Terry Kemp is a 59 year old male here for preoperative optimization.    I was asked to see Terry Kemp by Dr. Stevens  for preoperative evaluation    Date of Surgery: 10/22  Type of Surgery: left knee arthroscopy  Hospital:   [facility]     HPI: Patient arrives here for preop.  Patient cannot recall any injury but reports his left knee has progressively gotten worse.  He has had surgery on his left knee in the past.  Recent MRI did show a posterior horn tear of the medial meniscus.  Denies any swelling no clicking or locking.  Stairs are worse.      Fever/Chills or other infectious symptoms in past month:  no  >10lb weight loss in past two months:  No  Nursing Notes:   Gosselin, Norma J., LPN  10/14/2019 11:11 AM  Signed  Chief Complaint   Patient presents with     Pre-Op Exam     Left knee scope         Initial /88   Pulse 72   Temp 97.6  F (36.4  C) (Temporal)   Resp 16   Ht 1.689 m (5' 6.5\")   Wt 82.3 kg (181 lb 6.4 oz)   SpO2 98%   BMI 28.84 kg/m    Estimated body mass index is 28.84 kg/m  as calculated from the following:    Height as of this encounter: 1.689 m (5' 6.5\").    Weight as of this encounter: 82.3 kg (181 lb 6.4 oz).    Medication Reconciliation: complete    Date of Surgery: 10/22/19  Type of Surgery: Left knee scope  Surgeon: Dr Stevens  Hospital:  [facility]  Fax:     Fever/Chills or other infectious symptoms in past month: No  >10lb weight loss in past two months: No    Health Care Directive/Code status:  No  Hx of blood transfusions:   No   Td up to date: 12/19/15  History of VRE/MRSA:  No Date:     Preoperative Evaluation: Obstructive Sleep Apnea screening    S:Snore -  Do you snore loudly? (louder than talking or loud enough to be heard through closed doors)No  T: Tired - Do you often feeltired, fatigued, or sleepy during the daytime?No  O: Observed - Has anyone ever observed you stop " "breathing during your sleep?No  P: Pressure - Do you have or areyou being treated for high blood pressure?No  B: BMI - BMI greater than 35kg/m2?No  A: Age - Age over 50 years old?Yes  N: Neck - Neck circumference greater than 40 cm? 17in  G:Gender - Gender: Male? Male    Total number of \"YES\" responses:  1    Scoring: Low risk of ROYA 0-2  At Risk of ROYA: >3 High Risk ofOSA: 5-8      Norma J. Gosselin, LPN      Patient Active Problem List    Diagnosis Date Noted     Old peripheral tear of medial meniscus of left knee 10/14/2019     Priority: Medium     Hyperlipemia 06/11/2013     Priority: Medium     Synovial cyst 06/11/2013     Priority: Medium       Past Medical History:   Diagnosis Date     Hyperlipidemia     6/11/2013     Male erectile dysfunction     6/11/2013       Past Surgical History:   Procedure Laterality Date     ARTHROSCOPY SHOULDER      rt shoulder x two     COLONOSCOPY      2009,normal     EXCISE CYST GENERIC (LOCATION)      7/8/13,mucous cyst excision, right index finger. Dr. Bajwa     KNEE SURGERY      arthroscopy       OTHER SURGICAL HISTORY      7/8/13,35946.0,DE EXC SKIN MALIG < 0.5 CM FACE EARS EYELIDS,[place], left eye       Family History   Problem Relation Age of Onset     Coronary Artery Disease Mother      Lung Cancer Father         smoker     No Known Problems Sister      No Known Problems Brother      Colitis Half-Brother         colostomy bag       Social History     Tobacco Use     Smoking status: Never Smoker     Smokeless tobacco: Never Used   Substance Use Topics     Alcohol use: Yes     Alcohol/week: 0.0 standard drinks     Drug use: Never     Comment: Drug use: No       Current Outpatient Medications   Medication Sig Dispense Refill     atorvastatin (LIPITOR) 20 MG tablet TAKE 1 TABLET(20 MG) BY MOUTH DAILY 30 tablet 0       Allergies:  No Known Allergies    ROS:    Surgical:  patient denies previous complications from prior surgeries including but not limited to prolonged " "bleeding, anesthesia complications, dysrhythmias, surgical wound infections, or prolonged hospital stay.    Denies family hx of bleeding tendencies, anesthesia complications, or other problems with surgery.  For complete review of systems please see copied forms       -------------------------------------------------------------    PHYSICAL EXAM:  /88   Pulse 72   Temp 97.6  F (36.4  C) (Temporal)   Resp 16   Ht 1.689 m (5' 6.5\")   Wt 82.3 kg (181 lb 6.4 oz)   SpO2 98%   BMI 28.84 kg/m      EXAM:  General Appearance: Pleasant, alert, appropriate appearance for age. No acute distress  Head Exam: Normal. Normocephalic, atraumatic.  Eyes: PERRL, EOMI  Ears: Normal TM's bilaterally. Normal auditory canals and external ears.   OroPharynx: Normal buccal mucosa. Normal pharynx.  Neck: Supple, .  Lungs: Normal chest wall and respirations. Clear to auscultation, no wheezes or crackles.  Cardiovascular: Regular rate and rhythm. S1, S2, no murmurs.  Gastrointestinal: Soft, nontender, no abnormal masses or organomegaly. BS normal   Musculoskeletal: No edema.No joint effusion  Skin: no concerning or new rashes.  Psychiatric Exam: Alert and oriented, appropriate affect.      EKG:  normal EKG, normal sinus rhythm  ---------------------------------------------------------------  LABS  Results for orders placed or performed during the hospital encounter of 07/15/19   MR Knee Left w/o Contrast    Narrative    EXAMINATION: MR KNEE LEFT W/O CONTRAST    CLINICAL HISTORY:  Medial knee pain for the past 6 months after an  injury. History of arthroscopy with partial medial meniscectomy and  chondroplasty in 2013..  Knee pain, persistent, > 6wks of conservative  tx; Chronic pain of left knee; Chronic pain of left knee     COMPARISON: X-rays 5/29/2019, MRI left knee 2/1/2013    TECHNIQUE:  Sagittal T2 fat sat and PD, coronal PD fat sat and PD,  axial PD fat sat MR images of the knee were obtained.    FINDINGS:    Menisci:  There " is irregularity of the free edge of the medial  meniscal body. There is abnormal signal within the posterior horn  which extends to near the superior articular surface and may represent  a subtle posterior horn tear. Some of these changes are likely due to  interval partial medial meniscectomy. The lateral meniscus is intact.    Ligaments: ACL and PCL are intact. Medial collateral and lateral  collateral ligaments are intact.    Extensor mechanism:  Intact.    Fluid:  There is a small joint effusion. No Baker's cyst. There is a  small multiloculated cyst adjacent to the posterior horn of the medial  meniscus measuring 1.3 x 0.8 x 0.8 cm, ganglion versus para meniscal  cyst.    Osseous and articular structures:  There is new full-thickness  chondral disease along the weightbearing surface of the medial femoral  condyle with subchondral bone marrow edema. There has been interval  chondroplasty to the prior defect along the posterior margin of the  medial femoral condyle. There is persistent slight surface  irregularity of articular cartilage in this region but no large new  focal defect. The lateral compartment cartilage is slightly irregular  without focal defects. There is also mild surface irregularity of the  patellofemoral compartment cartilage. No acute fracture or  dislocation.      Impression    IMPRESSION:     1. Interval postoperative changes of partial medial meniscectomy with  residual and/or recurrent posterior horn tear and free edge fraying.  2. Ligaments are intact.  3. Progression of degenerative disease in the medial compartment  including multifocal full-thickness chondral fissuring with  subchondral bone marrow edema.  4. Multiloculated ganglia versus para meniscal cyst adjacent to the  posterior horn of the medial meniscus.    RENNY EDMONDS MD       ASSESSEMENT AND PLAN:    (Z01.818) Pre-op exam  (primary encounter diagnosis)  Commen patient is medically cleared to proceed with  surgery    (M23.204) Old peripheral tear of medial meniscus of left knee        PRE OP RECOMMENDATIONS:  Patient is on chronic pain medications no   Patient is on antiplatlet/anticoagulation medication none  Other medications that need adjustment perioperatively none     Other:  Patient was advised to call our office and the surgical services with any change in condition or new symptoms if they were to develop between today and their surgical date.  Especially any cardiopulmonary symptoms or symptoms concerning for an infection.    Rafi Corral MD 10/14/2019

## 2019-10-28 ENCOUNTER — OFFICE VISIT (OUTPATIENT)
Dept: ORTHOPEDICS | Facility: OTHER | Age: 59
End: 2019-10-28
Attending: ORTHOPAEDIC SURGERY
Payer: COMMERCIAL

## 2019-10-28 DIAGNOSIS — Z00.00 ROUTINE GENERAL MEDICAL EXAMINATION AT A HEALTH CARE FACILITY: Primary | ICD-10-CM

## 2019-10-28 PROCEDURE — 99024 POSTOP FOLLOW-UP VISIT: CPT | Performed by: ORTHOPAEDIC SURGERY

## 2019-11-11 NOTE — PROGRESS NOTES
CHIEF COMPLAINT: Left Knee Arthroscopy Postop    PROBLEMS:  FH LUNG CANCER (ICD-V16.1) (MSS48-N75.1)    PATIENT REPORTED MEDICATIONS:  LIPITOR TABLET (ATORVASTATIN CALCIUM TABS)     PATIENT REPORTED ALLERGIES:   Patient has no noted allergies.      HISTORY OF PRESENT ILLNESS:    Mr. Kemp is a 59-year-old gentleman who is about 2 weeks status post left knee arthroscopy and partial medial meniscectomy.  He is doing very well at this time.  No complaints with his knee.    The patient's health history form dated 10/28/19 was reviewed and signed.      PAST MEDICAL HISTORY:    Unremarkable    PAST ORTHOPEDIC SURGICAL HISTORY:    Left Knee Arthroscopy, Partial Medial Meniscectomy, Dr. Cee  Left Knee Arthroscopy, Meniscectomy, Chondroplasty 04/01/13  Right Shoulder Surgery x2    PAST SURGICAL HISTORY:    Reviewed     FAMILY HISTORY:     Heart Disease  FH Lung Cancer    SOCIAL HISTORY:       Patient has never smoked.   Passive smoke exposure - no  Alcohol Use - yes  Drug Use - no  HIV/High Risk - no    REVIEW OF SYSTEMS:  Joint or Muscle pain: Yes  Stiffness:  Yes  Swelling:  Yes  Difficulty in walking: No  Cold extremities: No  Weakness of muscles: No  Rash or Itching: No  Bruising:  No  Numbness/Tingling: No    PHYSICAL EXAMINATION:    His wounds are completely benign.  Sutures are removed today.  He is walking on it with just a very mild antalgic gait, but otherwise is doing quite well.  Minimal swelling about the knee.  Mild effusion yet.    ASSESSMENT:    Status post left knee arthroscopy, partial medial meniscectomy.    PLAN:   We are going to see him back at his regularly scheduled appointment.    Dictated by:  Krzysztof Cee MD  Copy to:  Leah Oscar MD, MD at [facility]    D:  10/28/19  T:   11/01/19    Typed and/or reviewed and corrected by signing  below, and sent to the Physician for final review and signature.      This  report was created using voice recording software and computer-generated templates. Although every effort has been made to review for and eliminate errors, some errors may still occur.

## 2019-11-14 ENCOUNTER — NURSE TRIAGE (OUTPATIENT)
Dept: FAMILY MEDICINE | Facility: OTHER | Age: 59
End: 2019-11-14

## 2019-11-14 DIAGNOSIS — E78.2 MIXED HYPERLIPIDEMIA: ICD-10-CM

## 2019-11-14 RX ORDER — ATORVASTATIN CALCIUM 10 MG/1
10 TABLET, FILM COATED ORAL DAILY
Qty: 90 TABLET | Refills: 1 | Status: SHIPPED | OUTPATIENT
Start: 2019-11-14 | End: 2020-02-05

## 2019-11-14 NOTE — TELEPHONE ENCOUNTER
Pt states Rx Lipitor is causing muscle soreness. Requesting to discuss alternatives or dose change.   LÁZARO

## 2019-11-14 NOTE — TELEPHONE ENCOUNTER
Attempt decreasing atorvastatin to 10mg daily.  Would encourage follow up with PCP if muscle aches are still persistent in 1 month.  Could change medications at that time.  Cholesterol recently completed; much improved from ~6 months ago.  Medication is helping.    Rx for 10mg sent to pharmacy.    Jeannette Salgado, DO

## 2019-11-14 NOTE — TELEPHONE ENCOUNTER
"S-(situation): Per call center:  Pt states Rx Lipitor is causing muscle soreness. Requesting to discuss alternatives or dose change.   LÁZARO    B-(background): Lipitor 20 mg started 06/10/19. Pateint states,  \"symptoms started about a month after starting Lipitor. I don't take any other medication but this\". Last lipid panel 10/14/19.     A-(assessment): My joints are sore. Lower extremities. Shoulders stiff. When I stretch it feels like I going to get a cramp but I stop it short so I don't get one. That is new for me. Rates soreness at 3-3.5/10. Pretty much constant. Denies any other symptoms. No abdominal pain. No back pain. No difficulty urinating.     R-(recommendations): It has got to be from the Lipitor. I talked to someone and they said this is common while taking this medication. I take my last pill tomorrow. Then I will need refills. But I don't know if it would be possible to reduce the amount or change it to another medication?  Patient is aware that PCP is out of clinic today and is requesting another physician to review and advise.     Will route to doc of the day for consideration as requested by patient. Pt requests physician consideration and a callback today please.     Amanda Blevins RN on 11/14/2019 at 9:42 AM      "

## 2019-11-21 ENCOUNTER — TRANSFERRED RECORDS (OUTPATIENT)
Dept: HEALTH INFORMATION MANAGEMENT | Facility: OTHER | Age: 59
End: 2019-11-21

## 2019-11-21 LAB — COLOGUARD-ABSTRACT: NEGATIVE

## 2020-01-02 ENCOUNTER — TELEPHONE (OUTPATIENT)
Dept: FAMILY MEDICINE | Facility: OTHER | Age: 60
End: 2020-01-02

## 2020-01-05 NOTE — TELEPHONE ENCOUNTER
I do not do this (schedulers should have this information). Usually I send people to [facility] or one of the other providers who does them. Schedulers should also have this information. Please assist the patient in getting this set up.Leah Oscar MD

## 2020-01-06 NOTE — TELEPHONE ENCOUNTER
Left message that if he wants an injection he can call [address] and schedule an appointment with orthopedics, or call and schedule with Dr. Montelongo to have these injections done.    Norma J. Gosselin, LPN .......  1/6/2020  9:32 AM

## 2020-02-05 ENCOUNTER — OFFICE VISIT (OUTPATIENT)
Dept: FAMILY MEDICINE | Facility: OTHER | Age: 60
End: 2020-02-05
Attending: FAMILY MEDICINE
Payer: COMMERCIAL

## 2020-02-05 VITALS
WEIGHT: 175.2 LBS | SYSTOLIC BLOOD PRESSURE: 128 MMHG | RESPIRATION RATE: 18 BRPM | TEMPERATURE: 97.3 F | HEART RATE: 88 BPM | DIASTOLIC BLOOD PRESSURE: 74 MMHG | BODY MASS INDEX: 27.85 KG/M2

## 2020-02-05 DIAGNOSIS — F41.9 ANXIETY: ICD-10-CM

## 2020-02-05 DIAGNOSIS — K21.9 GASTROESOPHAGEAL REFLUX DISEASE WITHOUT ESOPHAGITIS: ICD-10-CM

## 2020-02-05 DIAGNOSIS — E78.2 MIXED HYPERLIPIDEMIA: Primary | ICD-10-CM

## 2020-02-05 LAB
CHOLEST SERPL-MCNC: 166 MG/DL
HDLC SERPL-MCNC: 45 MG/DL (ref 23–92)
LDLC SERPL CALC-MCNC: 106 MG/DL
NONHDLC SERPL-MCNC: 121 MG/DL
TRIGL SERPL-MCNC: 73 MG/DL

## 2020-02-05 PROCEDURE — 36415 COLL VENOUS BLD VENIPUNCTURE: CPT | Mod: ZL | Performed by: FAMILY MEDICINE

## 2020-02-05 PROCEDURE — 99214 OFFICE O/P EST MOD 30 MIN: CPT | Performed by: FAMILY MEDICINE

## 2020-02-05 PROCEDURE — 80061 LIPID PANEL: CPT | Mod: ZL | Performed by: FAMILY MEDICINE

## 2020-02-05 RX ORDER — ATORVASTATIN CALCIUM 10 MG/1
10 TABLET, FILM COATED ORAL DAILY
Qty: 90 TABLET | Refills: 3 | Status: SHIPPED | OUTPATIENT
Start: 2020-02-05 | End: 2020-05-27

## 2020-02-05 ASSESSMENT — PAIN SCALES - GENERAL: PAINLEVEL: NO PAIN (0)

## 2020-02-05 NOTE — PATIENT INSTRUCTIONS
If ongoing acid reflux symptoms, consider a 2-4 weeks trial of OTC Omeprazole.   Continue with dietary changes to help with acid reflux.   If ongoing headaches, consider computer glasses and physical therapy.   The Subtle Art of Not Giving a F*CK

## 2020-02-05 NOTE — PROGRESS NOTES
SUBJECTIVE:   Terry Kemp is a 59 year old male who presents to clinic today for the following health issues:    S/p L knee arthroscopic surgery this past fall. This helped a lot. Similar symptoms with R knee.  He was feeling a bit depressed about his change in activity level, was not able to run he had been previously.  However, in the last few days, this seems to have improved.  He is hoping to get more involved with basketball, as this has been a source of stress reduction in the past.  Golf is 1 of his most important pastimes, typically this helps a lot with anxiety and depression.    Worsening acid reflux symptoms around the holidays.  He is abstained from alcohol for the past month with some dietary changes, and this is mostly improved, but he does notice intermittent issues.  He is planning a trip to [place] later this month.    Occasionally gets headaches after staring at his computer screen for a few hours.  These resolve if he gets up and takes a walk.    Blood pressure was elevated at his physical therapy appointment yesterday, but fine today.  Did discuss blood pressure in detail.    In general, he seems to have some mild ear pressure and throat discomfort.  Discussed that this could also be related to acid reflux.    He was on 20 mg of Lipitor and developed myalgias.  These have resolved since his dose was decreased to 10 mg.  He is in need of refills of this medication.    Notes ongoing issues with depression and anxiety.  Some of this is related to his daughter's suicide a few years ago.  Overall, he feels that he manages fairly well.  He does note that he is generally an introvert, and often is put in a position of needing to socialize more than he would like.  He is not interested in medications to help manage the symptoms.        Patient Active Problem List    Diagnosis Date Noted     Anxiety 02/07/2020     Priority: Medium     Gastroesophageal reflux disease without esophagitis 02/07/2020      Priority: Medium     Old peripheral tear of medial meniscus of left knee 10/14/2019     Priority: Medium     Hyperlipemia 06/11/2013     Priority: Medium     Synovial cyst 06/11/2013     Priority: Medium         Review of Systems 15 system ROS completed and negative other than: See HPI.      OBJECTIVE:     /74 (BP Location: Right arm, Patient Position: Sitting, Cuff Size: Adult Regular)   Pulse 88   Temp 97.3  F (36.3  C) (Tympanic)   Resp 18   Wt 79.5 kg (175 lb 3.2 oz)   BMI 27.85 kg/m    Body mass index is 27.85 kg/m .  Physical Exam  Constitutional:       Appearance: He is well-developed.   Eyes:      Conjunctiva/sclera: Conjunctivae normal.   Neck:      Thyroid: No thyromegaly.   Cardiovascular:      Rate and Rhythm: Normal rate and regular rhythm.      Heart sounds: Normal heart sounds. No murmur.   Pulmonary:      Effort: Pulmonary effort is normal. No respiratory distress.      Breath sounds: Normal breath sounds.   Abdominal:      Palpations: Abdomen is soft.   Lymphadenopathy:      Cervical: No cervical adenopathy.   Skin:     Findings: No rash.   Neurological:      Mental Status: He is alert and oriented to person, place, and time.         Diagnostic Test Results:  Results for orders placed or performed in visit on 02/05/20   Lipid Panel     Status: Abnormal   Result Value Ref Range    Cholesterol 166 <200 mg/dL    Triglycerides 73 <150 mg/dL    HDL Cholesterol 45 23 - 92 mg/dL    LDL Cholesterol Calculated 106 (H) <100 mg/dL    Non HDL Cholesterol 121 <130 mg/dL       PHQ-2 Score:     PHQ-2 ( 1999 Pfizer) 2/5/2020 10/14/2019   Q1: Little interest or pleasure in doing things 0 0   Q2: Feeling down, depressed or hopeless 1 0   PHQ-2 Score 1 0         ASSESSMENT/PLAN:           ICD-10-CM    1. Mixed hyperlipidemia E78.2 Lipid Panel     atorvastatin (LIPITOR) 10 MG tablet     Lipid Panel   2. Anxiety F41.9    3. Gastroesophageal reflux disease without esophagitis K21.9      Patient Instructions    If ongoing acid reflux symptoms, consider a 2-4 weeks trial of OTC Omeprazole.   Continue with dietary changes to help with acid reflux.   If ongoing headaches, consider computer glasses and physical therapy.   The Subtle Art of Not Giving a F*CK        Leah Oscar MD  [facility]

## 2020-02-05 NOTE — LETTER
February 5, 2020      Terry Kemp  [address] [facility] [address]        Dear Terry,     Please see the copy of your lab results below:    Resulted Orders   Lipid Panel   Result Value Ref Range    Cholesterol 166 <200 mg/dL    Triglycerides 73 <150 mg/dL    HDL Cholesterol 45 23 - 92 mg/dL    LDL Cholesterol Calculated 106 (H) <100 mg/dL      Comment:      Above desirable:  100-129 mg/dl  Borderline High:  130-159 mg/dL  High:             160-189 mg/dL  Very high:       >189 mg/dl      Non HDL Cholesterol 121 <130 mg/dL     This still looks good so you can continue on the current dose of Lipitor.     Please don't hesitate to call if you have concerns or questions.        Sincerely,        Leah Oscar MD

## 2020-02-05 NOTE — NURSING NOTE
"Patient here for a follow up on cholesterol.   Zhanna Frost LPN ..........2/5/2020 9:44 AM   Chief Complaint   Patient presents with     RECHECK     cholesterol       Initial /74 (BP Location: Right arm, Patient Position: Sitting, Cuff Size: Adult Regular)   Pulse 88   Temp 97.3  F (36.3  C) (Tympanic)   Resp 18   Wt 79.5 kg (175 lb 3.2 oz)   BMI 27.85 kg/m   Estimated body mass index is 27.85 kg/m  as calculated from the following:    Height as of 10/14/19: 1.689 m (5' 6.5\").    Weight as of this encounter: 79.5 kg (175 lb 3.2 oz).  Medication Reconciliation: complete    Zhanna Frost LPN    "

## 2020-02-07 PROBLEM — F41.9 ANXIETY: Status: ACTIVE | Noted: 2020-02-07

## 2020-02-07 PROBLEM — K21.9 GASTROESOPHAGEAL REFLUX DISEASE WITHOUT ESOPHAGITIS: Status: ACTIVE | Noted: 2020-02-07

## 2020-02-10 ENCOUNTER — MYC MEDICAL ADVICE (OUTPATIENT)
Dept: FAMILY MEDICINE | Facility: OTHER | Age: 60
End: 2020-02-10

## 2020-02-11 NOTE — TELEPHONE ENCOUNTER
Please see patients mychart message. Pharmacy has been updated.    Dottie Grubbs LPN on 2/11/2020 at 9:50 AM

## 2020-02-25 ENCOUNTER — MYC MEDICAL ADVICE (OUTPATIENT)
Dept: FAMILY MEDICINE | Facility: OTHER | Age: 60
End: 2020-02-25

## 2020-02-25 DIAGNOSIS — H69.93 DYSFUNCTION OF BOTH EUSTACHIAN TUBES: ICD-10-CM

## 2020-02-25 DIAGNOSIS — H65.93 FLUID LEVEL BEHIND TYMPANIC MEMBRANE OF BOTH EARS: Primary | ICD-10-CM

## 2020-03-02 ENCOUNTER — MYC MEDICAL ADVICE (OUTPATIENT)
Dept: FAMILY MEDICINE | Facility: OTHER | Age: 60
End: 2020-03-02

## 2020-03-03 NOTE — TELEPHONE ENCOUNTER
Please see ENT referral and sign if appropriate.  Ike Willis LPN,..............3/3/2020 1:03 PM

## 2020-03-03 NOTE — TELEPHONE ENCOUNTER
Referral was actually placed already, 2/25/20. Please find out why this has not yet be addressed. AET

## 2020-03-04 ENCOUNTER — MYC MEDICAL ADVICE (OUTPATIENT)
Dept: FAMILY MEDICINE | Facility: OTHER | Age: 60
End: 2020-03-04

## 2020-03-04 NOTE — TELEPHONE ENCOUNTER
I'm guessing the person handling the referral didn't see the latest correspondence. I have no preference, but sooner sounds better to the patient.

## 2020-03-11 ENCOUNTER — HEALTH MAINTENANCE LETTER (OUTPATIENT)
Age: 60
End: 2020-03-11

## 2020-03-17 DIAGNOSIS — H91.93 DECREASED HEARING OF BOTH EARS: Primary | ICD-10-CM

## 2020-03-23 ENCOUNTER — VIRTUAL VISIT (OUTPATIENT)
Dept: OTOLARYNGOLOGY | Facility: OTHER | Age: 60
End: 2020-03-23
Attending: NURSE PRACTITIONER
Payer: COMMERCIAL

## 2020-03-23 DIAGNOSIS — H93.8X3 PRESSURE SENSATION IN EAR, BILATERAL: ICD-10-CM

## 2020-03-23 DIAGNOSIS — K21.9 GASTROESOPHAGEAL REFLUX DISEASE, ESOPHAGITIS PRESENCE NOT SPECIFIED: Primary | ICD-10-CM

## 2020-03-23 PROCEDURE — 99443 ZZC PHYSICIAN TELEPHONE EVALUATION 21-30 MIN: CPT | Performed by: NURSE PRACTITIONER

## 2020-03-23 RX ORDER — CETIRIZINE HYDROCHLORIDE 10 MG/1
10 TABLET ORAL DAILY
Qty: 30 TABLET | Refills: 3 | Status: SHIPPED | OUTPATIENT
Start: 2020-03-23 | End: 2020-05-05

## 2020-03-23 RX ORDER — PANTOPRAZOLE SODIUM 40 MG/1
40 TABLET, DELAYED RELEASE ORAL DAILY
Qty: 30 TABLET | Refills: 3 | Status: SHIPPED | OUTPATIENT
Start: 2020-03-23 | End: 2020-04-14

## 2020-03-23 NOTE — PROGRESS NOTES
"  Terry Kemp is a 59 year old male who is being evaluated via a billable telephone visit.      The patient has been notified of following:     \"This telephone visit will be conducted via a call between you and your physician/provider. We have found that certain health care needs can be provided without the need for a physical exam.  This service lets us provide the care you need with a short phone conversation.  If a prescription is necessary we can send it directly to your pharmacy.  If lab work is needed we can place an order for that and you can then stop by our lab to have the test done at a later time.    If during the course of the call the physician/provider feels a telephone visit is not appropriate, you will not be charged for this service.\"     Terry Kemp complains of ear trouble and concerns for strong pressure in his ears.  The pressure is in the temples and below the ear lobes.  He also reports that loud noises are very hard on his ears.      Chief Complaint   Patient presents with     Ear Problem     fluid in left ear behind eustachian tube, acid reflux,      He feels the symptoms have been going on since January.  It started when he felt pressure in his forehead during landing on a flight.  He feels pressure and popping in his ears when he moves his jaw.  He denies nasal congestion.  He does feel that his hearing has been reduced, right is a little worse than left.  He has been using flonase for the past 1 month.  He has not used an AH or saline rinse.  He denies clenching or grinding of the teeth.  No flux hearin or tinnitus.      No history of recurrent OM  He is a never smoker   Patient has no history of otological surgeries or procedures  No family hx of congential hearing loss  His father had hearing loss later in lift  No current concerns with otalgia, otorrhea.   No history of noise exposure, many concerts, no significant work exposure  No vertigo, facial numbness or tingling.    He has a " "history of Vertigo x 1 in the past    He reports he has been having acid reflux.  Symptoms have been present since January.  He did try to change up his diet to decrease foods that cause acid reflux.  He reports globus sensation and burning sensation in the stomach and esophagus.  He denies esphageal spasm or dysphagia.  He denies black, bloody or tarry stools.      He takes an occasional aleve and \"headache medicine\".  He is not sure what it is.  No frequent Ibuprofen  He denies alcohol x 3 months  He is drinking 8-10 cups of water per day.  No caffeine   No hematemesis   He has been eating Mediterranean diet over the past 2-3 days and has noted improvement in symptoms.    No personal history of cancer.  His father  of lung CA.    He has used omeprazole in the past with short term improvement, he is not currently taking the omeprazole.      I have reviewed and updated the patient's Past Medical History, Social History, Family History and Medication List.    ALLERGIES  Patient has no known allergies.    Additional provider notes:  A telephone discussion was had instead of an in-person visit due to recommendations during Covid Virus.  Per his HPI he does not have any red-flag symptoms that would require emergent or urgent in person visit.  He does have a follow up visit in July with Audiogram prior.        Assessment/Plan:    ICD-10-CM    1. Gastroesophageal reflux disease, esophagitis presence not specified  K21.9 pantoprazole (PROTONIX) 40 MG EC tablet   2. Pressure sensation in ear, bilateral  H93.8X3 cetirizine (ZYRTEC) 10 MG tablet     Terry and I had a long discussion about ETD and reflux.  We will treat ETD with AH and rosalee med sinus rinses for now until we can get him back in to be seen for in person visit.  We will treat reflux with Protonix, he reported he wanted to try something different for treatment.  We discussed lifestyle changes and the possible need for EGD in the future if symptoms do not " resolve.  We discussed red flag symptoms that would prompt emergent/urgent follow up.        Phone call duration: 42 minutes    Lorie FORREST  [facility]  3:29 PM  March 23, 2020

## 2020-03-23 NOTE — PATIENT INSTRUCTIONS
Patient Education     Tips to Control Acid Reflux    To control acid reflux, you ll need to make some basic diet and lifestyle changes. The simple steps outlined below may be all you ll need to ease discomfort.  Watch what you eat    Avoid fatty foods and spicy foods.    Eat fewer acidic foods, such as citrus and tomato-based foods. These can increase symptoms.    Limit drinking alcohol, caffeine, and fizzy beverages. All increase acid reflux.    Try limiting chocolate, peppermint, and spearmint. These can worsen acid reflux in some people.  Watch when you eat    Avoid lying down for 3 hours after eating.    Do not snack before going to bed.  Raise your head  Raising your head and upper body by 4 to 6 inches helps limit reflux when you re lying down. Put blocks under the head of your bed frame to raise it.  Other changes    Lose weight, if you need to    Don t exercise near bedtime    Avoid tight-fitting clothes    Limit aspirin and ibuprofen    Stop smoking   Date Last Reviewed: 7/1/2016 2000-2019 [employer] [address] [place] [address], [place] [address]. All rights reserved. This information is not intended as a substitute for professional medical care. Always follow your healthcare professional's instructions.    Start Protonix as prescribed  Start rosalee med sinus rinses 2-3 times per day  Start Zyrtec (cetirizine) as prescribed  Keep your scheduled follow up with Lorie Rodriguez NP in July  Call or My Chart message if you have any questions or concerns before your next appointment  If no improvement or resolution of your heartburn symptoms you may require follow up for EGD or further testing.   Thank you for allowing Lorie FORREST and our ENT team to participate in your care.  If your medications are too expensive, please give the nurse a call.  We can possibly change this medication.  If you have a scheduling or an appointment question please contact our Health Unit Coordinator at their  direct line [phone].   ALL nursing questions or concerns can be directed to your ENT nurse at: [phone] Bianca

## 2020-04-02 ENCOUNTER — TELEPHONE (OUTPATIENT)
Dept: OTOLARYNGOLOGY | Facility: OTHER | Age: 60
End: 2020-04-02

## 2020-04-02 ENCOUNTER — MYC MEDICAL ADVICE (OUTPATIENT)
Dept: FAMILY MEDICINE | Facility: OTHER | Age: 60
End: 2020-04-02

## 2020-04-02 DIAGNOSIS — K21.9 GASTROESOPHAGEAL REFLUX DISEASE, ESOPHAGITIS PRESENCE NOT SPECIFIED: Primary | ICD-10-CM

## 2020-04-02 RX ORDER — FAMOTIDINE 40 MG/1
40 TABLET, FILM COATED ORAL DAILY
Qty: 30 TABLET | Refills: 3 | Status: SHIPPED | OUTPATIENT
Start: 2020-04-02 | End: 2020-05-05

## 2020-04-02 NOTE — TELEPHONE ENCOUNTER
I sent Pepcid to [facility]'s [place].  Can you please schedule a follow up telephone visit for him for next week?  Thanks, Zaida

## 2020-04-02 NOTE — TELEPHONE ENCOUNTER
Patient called today.    Patient has been seeing Lorie Rodriguez CNP at [facility].    States that last time spoke with this provider about stomach issues (even though ENT).    Would like a callback to discuss some questions.    Patient declinied triage.    Please contact patient.    Thank you.      Central Scheduling  Trupti Flores

## 2020-04-02 NOTE — TELEPHONE ENCOUNTER
This patient calls today with ongoing complaints of acid reflux. He states that he has stomach gurgling and heartburn that it causing him so much pain that he had to leave work. He states that he feels the reflux into his throat. Denies repeated regurgitation. He states that he has lost 15 pounds over the last 2 months; however he does state that he started a diet in January. He is not currently taking any OTC medications, but is taking the prescribed Protonix. He has been having normal stools. He is wondering if there is anything else he can try. Please Advise.

## 2020-04-03 ENCOUNTER — OFFICE VISIT (OUTPATIENT)
Dept: FAMILY MEDICINE | Facility: OTHER | Age: 60
End: 2020-04-03
Attending: FAMILY MEDICINE
Payer: COMMERCIAL

## 2020-04-03 VITALS
BODY MASS INDEX: 25.9 KG/M2 | OXYGEN SATURATION: 99 % | DIASTOLIC BLOOD PRESSURE: 80 MMHG | WEIGHT: 165 LBS | HEART RATE: 95 BPM | SYSTOLIC BLOOD PRESSURE: 138 MMHG | RESPIRATION RATE: 16 BRPM | HEIGHT: 67 IN | TEMPERATURE: 97.5 F

## 2020-04-03 DIAGNOSIS — K21.9 GASTROESOPHAGEAL REFLUX DISEASE WITHOUT ESOPHAGITIS: Primary | ICD-10-CM

## 2020-04-03 LAB
ALBUMIN SERPL-MCNC: 4.6 G/DL (ref 3.5–5.7)
ALP SERPL-CCNC: 36 U/L (ref 34–104)
ALT SERPL W P-5'-P-CCNC: 20 U/L (ref 7–52)
ANION GAP SERPL CALCULATED.3IONS-SCNC: 9 MMOL/L (ref 3–14)
AST SERPL W P-5'-P-CCNC: 17 U/L (ref 13–39)
BILIRUB SERPL-MCNC: 0.8 MG/DL (ref 0.3–1)
BUN SERPL-MCNC: 12 MG/DL (ref 7–25)
CALCIUM SERPL-MCNC: 10.3 MG/DL (ref 8.6–10.3)
CHLORIDE SERPL-SCNC: 102 MMOL/L (ref 98–107)
CO2 SERPL-SCNC: 29 MMOL/L (ref 21–31)
CREAT SERPL-MCNC: 1.11 MG/DL (ref 0.7–1.3)
ERYTHROCYTE [DISTWIDTH] IN BLOOD BY AUTOMATED COUNT: 12.5 % (ref 10–15)
GFR SERPL CREATININE-BSD FRML MDRD: 67 ML/MIN/{1.73_M2}
GLUCOSE SERPL-MCNC: 98 MG/DL (ref 70–105)
HCT VFR BLD AUTO: 44.8 % (ref 40–53)
HGB BLD-MCNC: 14.7 G/DL (ref 13.3–17.7)
MCH RBC QN AUTO: 28.9 PG (ref 26.5–33)
MCHC RBC AUTO-ENTMCNC: 32.8 G/DL (ref 31.5–36.5)
MCV RBC AUTO: 88 FL (ref 78–100)
PLATELET # BLD AUTO: 224 10E9/L (ref 150–450)
POTASSIUM SERPL-SCNC: 4.1 MMOL/L (ref 3.5–5.1)
PROT SERPL-MCNC: 7.5 G/DL (ref 6.4–8.9)
RBC # BLD AUTO: 5.08 10E12/L (ref 4.4–5.9)
SODIUM SERPL-SCNC: 140 MMOL/L (ref 134–144)
WBC # BLD AUTO: 6.9 10E9/L (ref 4–11)

## 2020-04-03 PROCEDURE — 99214 OFFICE O/P EST MOD 30 MIN: CPT | Performed by: FAMILY MEDICINE

## 2020-04-03 PROCEDURE — 85027 COMPLETE CBC AUTOMATED: CPT | Mod: ZL | Performed by: FAMILY MEDICINE

## 2020-04-03 PROCEDURE — 80053 COMPREHEN METABOLIC PANEL: CPT | Mod: ZL | Performed by: FAMILY MEDICINE

## 2020-04-03 PROCEDURE — 36415 COLL VENOUS BLD VENIPUNCTURE: CPT | Mod: ZL | Performed by: FAMILY MEDICINE

## 2020-04-03 RX ORDER — SUCRALFATE 1 G/1
1 TABLET ORAL 4 TIMES DAILY
Qty: 120 TABLET | Refills: 1 | Status: SHIPPED | OUTPATIENT
Start: 2020-04-03 | End: 2020-05-27

## 2020-04-03 RX ORDER — FLUTICASONE PROPIONATE 50 MCG
1 SPRAY, SUSPENSION (ML) NASAL DAILY
COMMUNITY
End: 2020-07-23

## 2020-04-03 ASSESSMENT — MIFFLIN-ST. JEOR: SCORE: 1504.13

## 2020-04-03 ASSESSMENT — PAIN SCALES - GENERAL: PAINLEVEL: MILD PAIN (2)

## 2020-04-03 NOTE — NURSING NOTE
"Chief Complaint   Patient presents with     Abdominal Pain     Started about 1 month ago      Patient presents today with epigastric pain that started about 1 month ago. He is taking Pantoprazole but has stopped taking Zyrtec and Pepcid today. He is wondering if he should continue them.     Initial BP (!) 168/92   Pulse 95   Temp 97.5  F (36.4  C) (Tympanic)   Resp 16   Ht 1.689 m (5' 6.5\")   Wt 74.8 kg (165 lb)   SpO2 99%   BMI 26.23 kg/m   Estimated body mass index is 26.23 kg/m  as calculated from the following:    Height as of this encounter: 1.689 m (5' 6.5\").    Weight as of this encounter: 74.8 kg (165 lb).  Medication Reconciliation: complete    Janette Tomlinson MA  "

## 2020-04-03 NOTE — NURSING NOTE
"Chief Complaint   Patient presents with     Abdominal Pain     Started about 1 month ago        Initial BP (!) 154/86   Pulse 95   Temp 97.5  F (36.4  C) (Tympanic)   Resp 16   Ht 1.689 m (5' 6.5\")   Wt 74.8 kg (165 lb)   SpO2 99%   BMI 26.23 kg/m   Estimated body mass index is 26.23 kg/m  as calculated from the following:    Height as of this encounter: 1.689 m (5' 6.5\").    Weight as of this encounter: 74.8 kg (165 lb).  Medication Reconciliation: {Medication Reconciliation:485700}    Janette Tomlinson MA  "

## 2020-04-03 NOTE — TELEPHONE ENCOUNTER
A message was left for the patient with this information.  He was advised to call back to schedule a follow up telephone visit.

## 2020-04-03 NOTE — PATIENT INSTRUCTIONS
Stay on the protonix daily. Hold off on the famotidine for now.   Add carafate before meals and at bedtime.   Stool test for H pylori (bacteria that can cause ulcers)  Labs today.  If symptoms persist, next step typically would be an endoscopy (camera to look at stomach).   Consider LEAP testing once clinic access is back to normal.

## 2020-04-03 NOTE — PROGRESS NOTES
"  SUBJECTIVE:   Terry Kemp is a 59 year old male who presents to clinic today for the following health issues:    Patient presents in clinic for ongoing severe epigastric pain.  Symptoms have progressed over the last few weeks.  He was started on a PPI on 3/23/2020 and has been taking this regularly.  Although he had previously noted symptoms, they all resolved after abstinence from alcohol in January.  He then went to [place] and had worsening epigastric symptoms.  He really has not been able to get them under control since then.  No hematemesis.  No nausea or vomiting.  No loose stools.  No blood in the stools.  Pain seems to be in the epigastric area along with reflux symptoms.    He has lost about 15 pounds since the fall.  But describes a fairly limited diet both for health reasons and because of what he can tolerate.  He eats very healthy and has been more active, getting outside daily for exercise.  After discussion, the weight loss seems appropriate for his lifestyle changes and not of concern.    Patient intermittently has had some elevated blood pressure readings.  Typically these improve with rest.    Ongoing issues with anxiety.  Thinks he has PTSD related to daughter's suicide a number of years ago.  Not interested in medications.  Has declined therapy in the past, but this is introduced as an option again.    Patient does note improved knee pain following weight loss.          Review of Systems  See HPI, All other systems reviewed and are otherwise negative.       OBJECTIVE:     /80   Pulse 95   Temp 97.5  F (36.4  C) (Tympanic)   Resp 16   Ht 1.689 m (5' 6.5\")   Wt 74.8 kg (165 lb)   SpO2 99%   BMI 26.23 kg/m    Body mass index is 26.23 kg/m .  Physical Exam  Constitutional:       Appearance: He is well-developed.   HENT:      Right Ear: External ear normal.      Left Ear: External ear normal.   Eyes:      General: No scleral icterus.     Conjunctiva/sclera: Conjunctivae normal. "   Cardiovascular:      Rate and Rhythm: Normal rate.   Pulmonary:      Effort: Pulmonary effort is normal. No respiratory distress.   Skin:     Findings: No rash.   Neurological:      Mental Status: He is alert.         Diagnostic Test Results:  Results for orders placed or performed in visit on 04/03/20   Comprehensive Metabolic Panel     Status: None   Result Value Ref Range    Sodium 140 134 - 144 mmol/L    Potassium 4.1 3.5 - 5.1 mmol/L    Chloride 102 98 - 107 mmol/L    Carbon Dioxide 29 21 - 31 mmol/L    Anion Gap 9 3 - 14 mmol/L    Glucose 98 70 - 105 mg/dL    Urea Nitrogen 12 7 - 25 mg/dL    Creatinine 1.11 0.70 - 1.30 mg/dL    GFR Estimate 67 >60 mL/min/[1.73_m2]    GFR Estimate If Black 81 >60 mL/min/[1.73_m2]    Calcium 10.3 8.6 - 10.3 mg/dL    Bilirubin Total 0.8 0.3 - 1.0 mg/dL    Albumin 4.6 3.5 - 5.7 g/dL    Protein Total 7.5 6.4 - 8.9 g/dL    Alkaline Phosphatase 36 34 - 104 U/L    ALT 20 7 - 52 U/L    AST 17 13 - 39 U/L   CBC W PLT No Diff     Status: None   Result Value Ref Range    WBC 6.9 4.0 - 11.0 10e9/L    RBC Count 5.08 4.4 - 5.9 10e12/L    Hemoglobin 14.7 13.3 - 17.7 g/dL    Hematocrit 44.8 40.0 - 53.0 %    MCV 88 78 - 100 fl    MCH 28.9 26.5 - 33.0 pg    MCHC 32.8 31.5 - 36.5 g/dL    RDW 12.5 10.0 - 15.0 %    Platelet Count 224 150 - 450 10e9/L       PHQ-2 Score:     PHQ-2 ( 1999 Pfizer) 4/3/2020 2/5/2020   Q1: Little interest or pleasure in doing things 0 0   Q2: Feeling down, depressed or hopeless 0 1   PHQ-2 Score 0 1         ASSESSMENT/PLAN:         ICD-10-CM    1. Gastroesophageal reflux disease without esophagitis  K21.9 sucralfate (CARAFATE) 1 GM tablet     CBC W PLT No Diff     Comprehensive Metabolic Panel     Comprehensive Metabolic Panel     CBC W PLT No Diff     H Pylori antigen, Stool     CANCELED: H Pylori antigen stool         Patient Instructions   Stay on the protonix daily. Hold off on the famotidine for now.   Add carafate before meals and at bedtime.   Stool test for H  pylori (bacteria that can cause ulcers)  Labs today.  If symptoms persist, next step typically would be an endoscopy (camera to look at stomach).   Consider LEAP testing once clinic access is back to normal.       Leah Oscar MD  [facility]

## 2020-04-07 ENCOUNTER — VIRTUAL VISIT (OUTPATIENT)
Dept: OTOLARYNGOLOGY | Facility: OTHER | Age: 60
End: 2020-04-07
Attending: NURSE PRACTITIONER
Payer: COMMERCIAL

## 2020-04-07 VITALS — HEIGHT: 67 IN | BODY MASS INDEX: 25.9 KG/M2 | WEIGHT: 165 LBS

## 2020-04-07 DIAGNOSIS — K21.9 GASTROESOPHAGEAL REFLUX DISEASE, ESOPHAGITIS PRESENCE NOT SPECIFIED: Primary | ICD-10-CM

## 2020-04-07 DIAGNOSIS — H93.8X3 PRESSURE SENSATION IN EAR, BILATERAL: ICD-10-CM

## 2020-04-07 PROCEDURE — 99213 OFFICE O/P EST LOW 20 MIN: CPT | Mod: TEL | Performed by: NURSE PRACTITIONER

## 2020-04-07 ASSESSMENT — MIFFLIN-ST. JEOR: SCORE: 1504.13

## 2020-04-07 ASSESSMENT — PAIN SCALES - GENERAL: PAINLEVEL: NO PAIN (0)

## 2020-04-07 NOTE — PATIENT INSTRUCTIONS
Thank you for allowing Lorie Rodriguez, NP and our ENT team to participate in your care.  If your medications are too expensive, please give the nurse a call.  We can possibly change this medication.  If you have a scheduling or an appointment question please contact our Health Unit Coordinator at their direct line [phone].   ALL nursing questions or concerns can be directed to your ENT Nurse--Bianca: [phone]      Adult lifestyle changes to prevent LPR reviewed      Avoid eating and drinking within two to three hours prior to bedtime    Do not drink alcohol    Eat small meals and slowly    Limit problem foods:    o Caffeine  o Carbonated drinks  o Chocolate  o Peppermint  o Tomato  o Citrus fruits  o Fatty and fried foods      Lose weight    Quit smoking    Wear loose clothing

## 2020-04-07 NOTE — PROGRESS NOTES
"Terry Kemp is a 59 year old male who is being evaluated via a billable telephone visit.      The patient has been notified of following:     \"This telephone visit will be conducted via a call between you and your physician/provider. We have found that certain health care needs can be provided without the need for a physical exam.  This service lets us provide the care you need with a short phone conversation.  If a prescription is necessary we can send it directly to your pharmacy.  If lab work is needed we can place an order for that and you can then stop by our lab to have the test done at a later time.    If during the course of the call the physician/provider feels a telephone visit is not appropriate, you will not be charged for this service.\"     Patient has given verbal consent for Telephone visit?  Yes    Terry Kemp complains of    Chief Complaint   Patient presents with     RECHECK     Follow Up GERD without Esophagitis     I last spoke with Terry via a telephone visit on 3/23/2020.  At that time he was c/o GERD symptoms and pressure in his ears.  We started AH and rosalee med rinses for the ETD and Protonix for reflux.  He since called with c/o stomach pain and worsening reflux.  He saw his PCP and was started on Carafate, lab work was completed, he has a future order for H. Pylori.  Lab work was largely unremarkable.   He reports significant improvement with the Carafate.  Symptoms have basically resolved.  He continues to take Protonix at this time.      Appetite is improved.  He has been eating regularly.  He denies hematemesis, black. Bloody or tarry stools.  He does report some diarrhea from a \"large glass of chocolate milk\" last night before bed.  We discussed at length avoiding chocolate and avoiding eating and drinking 2-3 hours prior to bed.  We also reiterated LPR guidelines as listed below.  He has been avoiding alcohol.     He reports that the plugged ear sensation has improved with flonase and " rosalee med sinus rinse.  He has not been taking AH.  He reports that he was concerned about taking too many medications at once.  No worsening of hearing loss.  No flux hearing or vertigo.      I have reviewed and updated the patient's Past Medical History, Social History, Family History and Medication List.    ALLERGIES  Patient has no known allergies.    Additional provider notes:  A telephone visit was done per CDC recommendations to limit non-urgent/emergent visits to the clinic during the Covid 19 Pandemic.  I advised Terry that evaluation is limited as I am not able to perform and examination.  Per HPI, he does not have any red flag symptoms and I do not feel that she needs to be seen urgent or emergently.        ICD-10-CM    1. Gastroesophageal reflux disease, esophagitis presence not specified  K21.9    2. Pressure sensation in ear, bilateral  H93.8X3      Adult lifestyle changes to prevent LPR reviewed      Avoid eating and drinking within two to three hours prior to bedtime    Do not drink alcohol    Eat small meals and slowly    Limit problem foods:    o Caffeine  o Carbonated drinks  o Chocolate  o Peppermint  o Tomato  o Citrus fruits  o Fatty and fried foods      Lose weight    Quit smoking    Wear loose clothing    Continue Protonix and Carafate.  Follow up with PCP in 2 months, sooner if symptoms worsen or change.  Complete H pylori stool sample.      Keep scheduled appointment for Audiogram in July 2020.  Continue rosalee med sinus rinses and flonase.      Lorie FORREST  [facility]  9:56 AM  April 7, 2020    Phone call duration: 18 minutes

## 2020-04-14 ENCOUNTER — TELEPHONE (OUTPATIENT)
Dept: OTOLARYNGOLOGY | Facility: OTHER | Age: 60
End: 2020-04-14

## 2020-04-14 DIAGNOSIS — K21.9 GASTROESOPHAGEAL REFLUX DISEASE, ESOPHAGITIS PRESENCE NOT SPECIFIED: ICD-10-CM

## 2020-04-14 RX ORDER — PANTOPRAZOLE SODIUM 40 MG/1
40 TABLET, DELAYED RELEASE ORAL DAILY
Qty: 30 TABLET | Refills: 3 | Status: SHIPPED | OUTPATIENT
Start: 2020-04-14 | End: 2020-08-06

## 2020-04-16 DIAGNOSIS — K21.9 GASTROESOPHAGEAL REFLUX DISEASE WITHOUT ESOPHAGITIS: ICD-10-CM

## 2020-04-16 LAB
H PYLORI AG STL QL IA: NORMAL
SPECIMEN SOURCE: NORMAL

## 2020-04-16 PROCEDURE — 87338 HPYLORI STOOL AG IA: CPT | Mod: ZL | Performed by: FAMILY MEDICINE

## 2020-04-17 ENCOUNTER — MYC MEDICAL ADVICE (OUTPATIENT)
Dept: FAMILY MEDICINE | Facility: OTHER | Age: 60
End: 2020-04-17

## 2020-04-20 ENCOUNTER — MYC MEDICAL ADVICE (OUTPATIENT)
Dept: FAMILY MEDICINE | Facility: OTHER | Age: 60
End: 2020-04-20

## 2020-04-21 NOTE — TELEPHONE ENCOUNTER
PCP is not in clinic today. Will route to teamlet for consideration. Amanda Blevins RN on 4/21/2020 at 11:18 AM

## 2020-05-01 NOTE — PATIENT INSTRUCTIONS
Will need a flexscope when we are able to see him back in clinic.  May consider EGD or TNE based on symptoms.    Continue Carafate and Protonix as currently prescribed  Continue LPR precautions, we discussed this at length:    Adult lifestyle changes to prevent LPR reviewed      Avoid eating and drinking within two to three hours prior to bedtime    Do not drink alcohol    Eat small meals and slowly    Limit problem foods:    o Caffeine  o Carbonated drinks  o Chocolate  o Peppermint  o Tomato  o Citrus fruits  o Fatty and fried foods      Lose weight    Quit smoking    Wear loose clothing  Continue Wing med sinus rinses and flonase for pressure sensation in bilateral ears  Consider daily AH  Can try benadryl 25-50 mg at bedtime for ear pressure and it may also help with sleep    Thank you for allowing Lorie Rodriguez NP and our ENT team to participate in your care.  If your medications are too expensive, please give the nurse a call.  We can possibly change this medication.  If you have a scheduling or an appointment question please contact our Health Unit Coordinator at their direct line [phone].   ALL nursing questions or concerns can be directed to your ENT Nurse--Bianca: [phone]

## 2020-05-01 NOTE — PROGRESS NOTES
"Terry Kemp is a 59 year old male who is being evaluated via a billable telephone visit.      The patient has been notified of following:     \"This telephone visit will be conducted via a call between you and your physician/provider. We have found that certain health care needs can be provided without the need for a physical exam.  This service lets us provide the care you need with a short phone conversation.  If a prescription is necessary we can send it directly to your pharmacy.  If lab work is needed we can place an order for that and you can then stop by our lab to have the test done at a later time.    Telephone visits are billed at different rates depending on your insurance coverage. During this emergency period, for some insurers they may be billed the same as an in-person visit.  Please reach out to your insurance provider with any questions.    If during the course of the call the physician/provider feels a telephone visit is not appropriate, you will not be charged for this service.\"    Patient has given verbal consent for Telephone visit?  Yes    What phone number would you like to be contacted at? [phone]    How would you like to obtain your AVS? Mail a copy     Otolaryngology Note         Chief Complaint:     Patient presents with:  RECHECK: Follow Up GERD and ears.  Pt is on the Carafate and it has worked very well at first.  It still is better than what he was doing before.           History of Present Illness:     Terry Kemp is a 59 year old male seen today for follow up of GERD.      He feels that he has mostly good days, an occasional day that he has some stomach upset and heartburn.  He continues with Protonix and Carafate. He requires an occasional tums for breakthrough heartburn. Dysphagia, globus sensation and sore throat have resolved.  He reports he has been following LPR diet closely.  He has lost some weight and is feeling well.  He did have an abscessed tooth and was treated " with PCN and flagyl, this caused some diarrhea and stomach upset that has resolved now that he stopped the abx.      He feels his ears have been doing well. He has occasional ear pressure left > right that is improved by rosalee med rinses and flonase.      He reports he has not been sleeping well, he has a history of insomnia that was worse after having PTSD with losing his daughter.  We discussed some ways to help with sleep.  He asked about medical marijuana.  I advised him that he would have to see a provider who is certified to prescribed medical marijuana, I am not.  He could try 25-50 mg benadryl before bed to help with sleep.  He will give this a try as it may also help with the pressure in the ears.           Medications:     Current Outpatient Rx   Medication Sig Dispense Refill     atorvastatin (LIPITOR) 10 MG tablet Take 1 tablet (10 mg) by mouth daily 90 tablet 3     fluticasone (FLONASE) 50 MCG/ACT nasal spray Spray 1 spray into both nostrils daily       pantoprazole (PROTONIX) 40 MG EC tablet Take 1 tablet (40 mg) by mouth daily 30 tablet 3     sucralfate (CARAFATE) 1 GM tablet Take 1 tablet (1 g) by mouth 4 times daily 120 tablet 1            Allergies:     Allergies: Patient has no known allergies.          Past Medical History:     Past Medical History:   Diagnosis Date     Hyperlipidemia     6/11/2013     Male erectile dysfunction     6/11/2013            Past Surgical History:     Past Surgical History:   Procedure Laterality Date     ARTHROSCOPY SHOULDER      rt shoulder x two     COLONOSCOPY      2009,normal     EXCISE CYST GENERIC (LOCATION)      7/8/13,mucous cyst excision, right index finger. Dr. Bajwa     KNEE SURGERY      arthroscopy       OTHER SURGICAL HISTORY      7/8/13,98462.0,WA EXC SKIN MALIG < 0.5 CM FACE EARS EYELIDS,Serleth, left eye       ENT family history reviewed         Social History:     Social History     Tobacco Use     Smoking status: Never Smoker     Smokeless tobacco:  Never Used   Substance Use Topics     Alcohol use: Not Currently     Alcohol/week: 0.0 standard drinks     Drug use: Never     Comment: Drug use: No            Review of Systems:     ROS: See HPI         Assessment and Plan:       ICD-10-CM    1. Gastroesophageal reflux disease, esophagitis presence not specified  K21.9    2. Pressure sensation in ear, bilateral  H93.8X3      He will need a flexscope when we are able to see him back in clinic.  May consider EGD or TNE based on symptoms.    Continue Carafate and Protonix as currently prescribed  Continue LPR precautions, we discussed this at length  Continue Wing med sinus rinses and flonase for pressure sensation in bilateral ears  Consider daily AH  Can try benadryl 25-50 mg at bedtime for ear pressure and it may also help with sleep    Phone call duration: 20+ minutes    Lorie GANC  [facility]

## 2020-05-05 ENCOUNTER — MYC MEDICAL ADVICE (OUTPATIENT)
Dept: FAMILY MEDICINE | Facility: OTHER | Age: 60
End: 2020-05-05

## 2020-05-05 ENCOUNTER — VIRTUAL VISIT (OUTPATIENT)
Dept: OTOLARYNGOLOGY | Facility: OTHER | Age: 60
End: 2020-05-05
Attending: NURSE PRACTITIONER
Payer: COMMERCIAL

## 2020-05-05 VITALS — HEIGHT: 67 IN | BODY MASS INDEX: 24.96 KG/M2 | WEIGHT: 159 LBS

## 2020-05-05 DIAGNOSIS — H93.8X3 PRESSURE SENSATION IN EAR, BILATERAL: ICD-10-CM

## 2020-05-05 DIAGNOSIS — K21.9 GASTROESOPHAGEAL REFLUX DISEASE, ESOPHAGITIS PRESENCE NOT SPECIFIED: Primary | ICD-10-CM

## 2020-05-05 PROCEDURE — 99214 OFFICE O/P EST MOD 30 MIN: CPT | Mod: 95 | Performed by: NURSE PRACTITIONER

## 2020-05-05 ASSESSMENT — PAIN SCALES - GENERAL: PAINLEVEL: NO PAIN (0)

## 2020-05-05 ASSESSMENT — MIFFLIN-ST. JEOR: SCORE: 1476.91

## 2020-05-08 ENCOUNTER — NURSE TRIAGE (OUTPATIENT)
Dept: FAMILY MEDICINE | Facility: OTHER | Age: 60
End: 2020-05-08

## 2020-05-08 ENCOUNTER — MYC MEDICAL ADVICE (OUTPATIENT)
Dept: FAMILY MEDICINE | Facility: OTHER | Age: 60
End: 2020-05-08

## 2020-05-08 ENCOUNTER — VIRTUAL VISIT (OUTPATIENT)
Dept: FAMILY MEDICINE | Facility: OTHER | Age: 60
End: 2020-05-08
Attending: PHYSICIAN ASSISTANT
Payer: COMMERCIAL

## 2020-05-08 DIAGNOSIS — F41.9 MODERATE ANXIETY: ICD-10-CM

## 2020-05-08 DIAGNOSIS — G47.00 INSOMNIA, UNSPECIFIED TYPE: Primary | ICD-10-CM

## 2020-05-08 DIAGNOSIS — K21.9 GASTROESOPHAGEAL REFLUX DISEASE WITHOUT ESOPHAGITIS: Primary | ICD-10-CM

## 2020-05-08 PROCEDURE — 99214 OFFICE O/P EST MOD 30 MIN: CPT | Mod: 95 | Performed by: PHYSICIAN ASSISTANT

## 2020-05-08 RX ORDER — BUSPIRONE HYDROCHLORIDE 5 MG/1
TABLET ORAL
Qty: 40 TABLET | Refills: 2 | Status: SHIPPED | OUTPATIENT
Start: 2020-05-08 | End: 2020-05-18

## 2020-05-08 RX ORDER — TRAZODONE HYDROCHLORIDE 50 MG/1
50-100 TABLET, FILM COATED ORAL AT BEDTIME
Qty: 60 TABLET | Refills: 2 | Status: SHIPPED | OUTPATIENT
Start: 2020-05-08 | End: 2020-05-18

## 2020-05-08 ASSESSMENT — ANXIETY QUESTIONNAIRES
IF YOU CHECKED OFF ANY PROBLEMS ON THIS QUESTIONNAIRE, HOW DIFFICULT HAVE THESE PROBLEMS MADE IT FOR YOU TO DO YOUR WORK, TAKE CARE OF THINGS AT HOME, OR GET ALONG WITH OTHER PEOPLE: SOMEWHAT DIFFICULT
2. NOT BEING ABLE TO STOP OR CONTROL WORRYING: NEARLY EVERY DAY
3. WORRYING TOO MUCH ABOUT DIFFERENT THINGS: NEARLY EVERY DAY
7. FEELING AFRAID AS IF SOMETHING AWFUL MIGHT HAPPEN: NEARLY EVERY DAY
6. BECOMING EASILY ANNOYED OR IRRITABLE: NOT AT ALL
5. BEING SO RESTLESS THAT IT IS HARD TO SIT STILL: SEVERAL DAYS
GAD7 TOTAL SCORE: 12
1. FEELING NERVOUS, ANXIOUS, OR ON EDGE: SEVERAL DAYS

## 2020-05-08 ASSESSMENT — PATIENT HEALTH QUESTIONNAIRE - PHQ9
SUM OF ALL RESPONSES TO PHQ QUESTIONS 1-9: 11
5. POOR APPETITE OR OVEREATING: SEVERAL DAYS

## 2020-05-08 NOTE — TELEPHONE ENCOUNTER
YULIYA below.  Patient has phone appointment with Lorie Zabala PA-C today to discuss sleep issue.  Kimberly Kaur LPN ...... 5/8/2020 1:38 PM

## 2020-05-08 NOTE — PROGRESS NOTES
"Terry Kemp is a 59 year old male who is being evaluated via a billable telephone visit.      The patient has been notified of following:     \"This telephone visit will be conducted via a call between you and your physician/provider. We have found that certain health care needs can be provided without the need for a physical exam.  This service lets us provide the care you need with a short phone conversation.  If a prescription is necessary we can send it directly to your pharmacy.  If lab work is needed we can place an order for that and you can then stop by our lab to have the test done at a later time.    Telephone visits are billed at different rates depending on your insurance coverage. During this emergency period, for some insurers they may be billed the same as an in-person visit.  Please reach out to your insurance provider with any questions.    If during the course of the call the physician/provider feels a telephone visit is not appropriate, you will not be charged for this service.\"    Patient has given verbal consent for Telephone visit?  Yes    What phone number would you like to be contacted at? [phone]    How would you like to obtain your AVS? Jani Calderón     Terry Kemp is a 59 year old male who presents to clinic today for the following health issues:    HPI    Patient struggling with sleep.  He has been struggling with severe stomach issues over the last 2 months.  Having hard time sleeping.  Feels like he is very anxious.  History of having PTSD after his daughter took her life approximately 7 years ago.  He states that he has never been a good sleeper.  He ended up getting some Ambien from a friend and took it for approximately 5 days.  It worked well for 1 day however it did not work after that.  He feels like his mind is racing and he has bad thoughts.  No suicidal or homicidal ideation.  He has down thoughts about himself in regards to why he cannot sleep at night.  He has " an appointment with a therapist on 5/12/2020.  He is concerned about his relationship with his girlfriend in his life with not being able to sleep.  He has a hard time calming down.  He even tried putting on calming mood music this past evening which did not help.  No caffeine intake or alcohol intake.  Currently taking sucralfate and Protonix which is helping to calm down his stomach.  Currently seeing ENT in regards to his stomach issues.  Feels like his stomach is slowly getting better.  The discomfort is not as severe as it was before.  Previously took Celexa several years ago with the initial PTSD symptoms.  Tolerated this medication well.  No side effects noted.  Has a brother that is currently taking buspirone and is doing really well without medication.      Patient Active Problem List   Diagnosis     Hyperlipemia     Synovial cyst     Old peripheral tear of medial meniscus of left knee     Anxiety     Gastroesophageal reflux disease without esophagitis     Past Surgical History:   Procedure Laterality Date     ARTHROSCOPY SHOULDER      rt shoulder x two     COLONOSCOPY      2009,normal     EXCISE CYST GENERIC (LOCATION)      7/8/13,mucous cyst excision, right index finger. Dr. Bajwa     KNEE SURGERY      arthroscopy       OTHER SURGICAL HISTORY      7/8/13,44828.0,NM EXC SKIN MALIG < 0.5 CM FACE EARS EYELIDS,Serleth, left eye       Social History     Tobacco Use     Smoking status: Never Smoker     Smokeless tobacco: Never Used   Substance Use Topics     Alcohol use: Not Currently     Alcohol/week: 0.0 standard drinks     Family History   Problem Relation Age of Onset     Coronary Artery Disease Mother      Lung Cancer Father         smoker     No Known Problems Sister      No Known Problems Brother      Colitis Half-Brother         colostomy bag         Current Outpatient Medications   Medication Sig Dispense Refill     atorvastatin (LIPITOR) 10 MG tablet Take 1 tablet (10 mg) by mouth daily 90 tablet 3      busPIRone (BUSPAR) 5 MG tablet Take 1 tab PO Qam x 2-3 days, then increase to 2 tabs PO BID for 1 week. Can increase to 2 tabs PO BID prn anxiety 40 tablet 2     fluticasone (FLONASE) 50 MCG/ACT nasal spray Spray 1 spray into both nostrils daily       pantoprazole (PROTONIX) 40 MG EC tablet Take 1 tablet (40 mg) by mouth daily 30 tablet 3     sucralfate (CARAFATE) 1 GM tablet Take 1 tablet (1 g) by mouth 4 times daily 120 tablet 1     traZODone (DESYREL) 50 MG tablet Take 1-2 tablets ( mg) by mouth At Bedtime 60 tablet 2     No Known Allergies  BP Readings from Last 3 Encounters:   04/03/20 138/80   02/05/20 128/74   10/14/19 136/88    Wt Readings from Last 3 Encounters:   05/05/20 72.1 kg (159 lb)   04/07/20 74.8 kg (165 lb)   04/03/20 74.8 kg (165 lb)                    Reviewed and updated as needed this visit by Provider  Tobacco  Allergies  Meds  Problems  Med Hx  Surg Hx  Fam Hx         Review of Systems   Constitutional, HEENT, cardiovascular, pulmonary, gi and gu systems are negative, except as otherwise noted.       Objective   Reported vitals:  There were no vitals taken for this visit.   healthy, alert and no distress  PSYCH: Alert and oriented times 3; coherent speech, normal   rate and volume, able to articulate logical thoughts, able   to abstract reason, no tangential thoughts, no hallucinations   or delusions  His affect is anxious  RESP: No cough, no audible wheezing, able to talk in full sentences  Remainder of exam unable to be completed due to telephone visits    Diagnostic Test Results:  none         Assessment/Plan:  1. Insomnia, unspecified type  Patient was started on trazodone to use as needed at bedtime.  Gave side effect profile.  Gave information in regards to good sleep hygiene.  Encouraged to recheck telephone or video appointment in 2 to 3 weeks for monitoring.  - traZODone (DESYREL) 50 MG tablet; Take 1-2 tablets ( mg) by mouth At Bedtime  Dispense: 60 tablet;  Refill: 2    2. Moderate anxiety  Discussed options at length.  Encouraged to keep his therapy appointment.  Patient requested to start on buspirone to use for the anxiety.  Gave side effect profile.  Sent to the pharmacy.  Encouraged to complete a recheck appointment in 2 to 3 weeks for monitoring.  Gave warning signs and symptoms.  - busPIRone (BUSPAR) 5 MG tablet; Take 1 tab PO Qam x 2-3 days, then increase to 2 tabs PO BID for 1 week. Can increase to 2 tabs PO BID prn anxiety  Dispense: 40 tablet; Refill: 2    Return if symptoms worsen or fail to improve.      Phone call duration:  32 minutes    Lorie Zabala PA-C

## 2020-05-08 NOTE — TELEPHONE ENCOUNTER
Patient will discuss with Lorie Zabala PA-C today in telephone visit.  Kimberly Kaur LPN ...... 5/8/2020 1:37 PM

## 2020-05-09 ASSESSMENT — ANXIETY QUESTIONNAIRES: GAD7 TOTAL SCORE: 12

## 2020-05-11 NOTE — PATIENT INSTRUCTIONS
Thank you for allowing Dr. Morales and our ENT team to participate in your care.  If your medications are too expensive, please give the nurse a call.  We can possibly change this medication.  If you have a scheduling or an appointment question please contact our Health Unit Coordinator at their direct line [phone].   ALL nursing questions or concerns can be directed to your ENT nurse at: [phone] Stanford Connolly    Complete an mri of the inner ear.  They will call you from [facility] to schedule.  Use hearing protection when needed.  Follow up in 6 months for an audiogram.  Follow up with your therapist for anxiety.

## 2020-05-14 ENCOUNTER — MYC MEDICAL ADVICE (OUTPATIENT)
Dept: FAMILY MEDICINE | Facility: OTHER | Age: 60
End: 2020-05-14

## 2020-05-14 DIAGNOSIS — G47.00 INSOMNIA, UNSPECIFIED TYPE: Primary | ICD-10-CM

## 2020-05-15 ENCOUNTER — MYC MEDICAL ADVICE (OUTPATIENT)
Dept: FAMILY MEDICINE | Facility: OTHER | Age: 60
End: 2020-05-15

## 2020-05-15 RX ORDER — TEMAZEPAM 15 MG/1
15 CAPSULE ORAL
Qty: 30 CAPSULE | Refills: 1 | Status: SHIPPED | OUTPATIENT
Start: 2020-05-15 | End: 2020-05-18

## 2020-05-18 ENCOUNTER — TELEPHONE (OUTPATIENT)
Dept: SURGERY | Facility: OTHER | Age: 60
End: 2020-05-18

## 2020-05-18 ENCOUNTER — MYC MEDICAL ADVICE (OUTPATIENT)
Dept: FAMILY MEDICINE | Facility: OTHER | Age: 60
End: 2020-05-18

## 2020-05-18 ENCOUNTER — OFFICE VISIT (OUTPATIENT)
Dept: AUDIOLOGY | Facility: OTHER | Age: 60
End: 2020-05-18
Attending: AUDIOLOGIST
Payer: COMMERCIAL

## 2020-05-18 ENCOUNTER — OFFICE VISIT (OUTPATIENT)
Dept: OTOLARYNGOLOGY | Facility: OTHER | Age: 60
End: 2020-05-18
Attending: OTOLARYNGOLOGY
Payer: COMMERCIAL

## 2020-05-18 VITALS
TEMPERATURE: 97.1 F | HEIGHT: 67 IN | BODY MASS INDEX: 25.9 KG/M2 | WEIGHT: 165 LBS | HEART RATE: 94 BPM | DIASTOLIC BLOOD PRESSURE: 72 MMHG | SYSTOLIC BLOOD PRESSURE: 118 MMHG | OXYGEN SATURATION: 97 %

## 2020-05-18 DIAGNOSIS — F41.9 ANXIETY: ICD-10-CM

## 2020-05-18 DIAGNOSIS — H93.8X3 PRESSURE SENSATION IN EAR, BILATERAL: ICD-10-CM

## 2020-05-18 DIAGNOSIS — H93.8X3 EAR FULLNESS, BILATERAL: ICD-10-CM

## 2020-05-18 DIAGNOSIS — F41.9 MODERATE ANXIETY: ICD-10-CM

## 2020-05-18 DIAGNOSIS — H90.3 SENSORINEURAL HEARING LOSS, ASYMMETRICAL: Primary | ICD-10-CM

## 2020-05-18 DIAGNOSIS — K21.9 GASTROESOPHAGEAL REFLUX DISEASE, ESOPHAGITIS PRESENCE NOT SPECIFIED: ICD-10-CM

## 2020-05-18 DIAGNOSIS — H90.3 ASNHL (ASYMMETRICAL SENSORINEURAL HEARING LOSS): Primary | ICD-10-CM

## 2020-05-18 PROCEDURE — 92557 COMPREHENSIVE HEARING TEST: CPT | Performed by: AUDIOLOGIST

## 2020-05-18 PROCEDURE — 92504 EAR MICROSCOPY EXAMINATION: CPT | Performed by: OTOLARYNGOLOGY

## 2020-05-18 PROCEDURE — 92550 TYMPANOMETRY & REFLEX THRESH: CPT | Performed by: AUDIOLOGIST

## 2020-05-18 PROCEDURE — 99213 OFFICE O/P EST LOW 20 MIN: CPT | Mod: 25 | Performed by: OTOLARYNGOLOGY

## 2020-05-18 RX ORDER — BUSPIRONE HYDROCHLORIDE 10 MG/1
10 TABLET ORAL 3 TIMES DAILY
Qty: 90 TABLET | Refills: 1 | Status: SHIPPED | OUTPATIENT
Start: 2020-05-18 | End: 2020-05-27

## 2020-05-18 ASSESSMENT — PAIN SCALES - GENERAL: PAINLEVEL: NO PAIN (0)

## 2020-05-18 ASSESSMENT — MIFFLIN-ST. JEOR: SCORE: 1504.13

## 2020-05-18 NOTE — NURSING NOTE
"Chief Complaint   Patient presents with     RECHECK     Follow Up GERD, Bilateral Ear Pressure       Initial /72   Pulse 94   Temp 97.1  F (36.2  C) (Tympanic)   Ht 1.689 m (5' 6.5\")   Wt 74.8 kg (165 lb)   SpO2 97%   BMI 26.23 kg/m   Estimated body mass index is 26.23 kg/m  as calculated from the following:    Height as of this encounter: 1.689 m (5' 6.5\").    Weight as of this encounter: 74.8 kg (165 lb).  Medication Reconciliation: complete  Sandhya Perez LPN  "

## 2020-05-18 NOTE — LETTER
2020         RE: Terry Kemp  [address] [place] [address]        Dear Colleague,    Thank you for referring your patient, Terry Kemp, to the [facility]. Please see a copy of my visit note below.    Otolaryngology Progress Note  Patient: Terry Kemp  : 1960    Patient presents with:  RECHECK: Follow Up GERD, Bilateral Ear Pressure      HPI:  Terry Kemp is a 59 year old male seen today for bilateral aural fullness  Over the past month and a half he feels on sensation of building pressure in both of his ears as if a balloon will pop  He denies fluctuating hearing loss or any noticeable hearing loss  He denies tinnitus or vertigo    Significant history of anxiety secondary to history of his daughter's suicide several years ago in April.      Over the past 3 to 4 months he has had significant worsening of his reflux which is now well controlled with Protonix Carafate and Tums.     His globus pharyngeus has resolve  His reflux has resolved  He has lost weight about 15 pounds in 3 months which she relates to not eating as much when he had bad reflux but also anxiety  He states he does not sleep well at night but denies sleep apnea    Never tobacco user  No otalgia, but ear pressure  No known bruxism    Prn use of Neilmed saline and Flonase.      No prior audiograms.    He saw Zaida on  for a virtual visit.  prior to this he had another virtual visit on 323 with [facility]    Audiogram todays date:  Normal thresholds sloping to a moderate to severe left asymmetrical sensorineural hearing loss and a mild right high-frequency sensorineural hearing loss type a tympanograms bilaterally 100% discrimination SRT 10 dB right 15 dB left    Current Outpatient Rx   Medication Sig Dispense Refill     atorvastatin (LIPITOR) 10 MG tablet Take 1 tablet (10 mg) by mouth daily 90 tablet 3     busPIRone (BUSPAR) 10 MG tablet Take 1 tablet (10 mg) by mouth 3 times daily 90 tablet 1      "fluticasone (FLONASE) 50 MCG/ACT nasal spray Spray 1 spray into both nostrils daily       pantoprazole (PROTONIX) 40 MG EC tablet Take 1 tablet (40 mg) by mouth daily 30 tablet 3     sucralfate (CARAFATE) 1 GM tablet Take 1 tablet (1 g) by mouth 4 times daily 120 tablet 1       Allergies: Patient has no known allergies.     Past Medical History:   Diagnosis Date     Hyperlipidemia     6/11/2013     Male erectile dysfunction     6/11/2013       Past Surgical History:   Procedure Laterality Date     ARTHROSCOPY SHOULDER      rt shoulder x two     COLONOSCOPY      2009,normal     EXCISE CYST GENERIC (LOCATION)      7/8/13,mucous cyst excision, right index finger. Dr. Bajwa     KNEE SURGERY      arthroscopy       OTHER SURGICAL HISTORY      7/8/13,28910.0,TX EXC SKIN MALIG < 0.5 CM FACE EARS EYELIDS,[place], left eye       ENT family history reviewed    Social History     Tobacco Use     Smoking status: Never Smoker     Smokeless tobacco: Never Used   Substance Use Topics     Alcohol use: Not Currently     Alcohol/week: 0.0 standard drinks     Drug use: Never     Comment: Drug use: No       Review of Systems  ROS: 10 point ROS neg other than the symptoms noted above in the HPI and shortness of breath on exertion heartburn tinnitus ear plugging occasional loss of balance, history of depression and anxiety     Physical Exam  /72   Pulse 94   Temp 97.1  F (36.2  C) (Tympanic)   Ht 1.689 m (5' 6.5\")   Wt 74.8 kg (165 lb)   SpO2 97%   BMI 26.23 kg/m    General - The patient is well nourished and well developed, and appears to have good nutritional status.  Alert and oriented to person and place, answers questions and cooperates with examination appropriately.  anxious  Head and Face - Normocephalic and atraumatic, with no gross asymmetry noted.  The facial nerve is intact grade 1/6 bilaterally, with strong symmetric movements.  Gait intact,   No spontaneous nystagmus  Voice and Breathing - The patient was " breathing comfortably without the use of accessory muscles. There was no wheezing, stridor, or stertor.  The patients voice was clear and strong, and had appropriate pitch and quality.  No stephenie peripheral digital clubbing or cyanosis   Ears -examined under microscopy bilaterally  Cerumen removed from left EAC with pick and alligator. The external auditory canals are patent, the tympanic membranes are intact without effusion, retraction or mass.  Bony landmarks are intact.  TMJ without crepitus, with click bilaterally, nontender  Eyes - Extraocular movements intact, and the pupils were reactive to light.  Sclera were not icteric or injected, conjunctiva were pink and moist.  Mouth - Examination of the oral cavity showed pink, healthy oral mucosa. No lesions or ulcerations noted.  The tongue was mobile and midline, and the dentition were in good condition.    Throat - The walls of the oropharynx were smooth, pink, moist, symmetric, and had no lesions or ulcerations.  The tonsillar pillars and soft palate were symmetric.  The uvula was midline on elevation.    Neck - No palpable enlarged fixed cervical lymph nodes.  No neck cysts or unusual tenderness to palpation.   No palpable fixed thyroid nodules or concerning goiter.  The trachea is grossly midline.   Nose - External contour is symmetric, no gross deflection or scars.  Nasal mucosa is pink and moist with no abnormal mucus.  The septum and turbinates were evaluated: DNS right, mild ith bilaterally.  No polyps, masses, or purulence noted on examination.      Impression and Plan- Terry Kemp is a 59 year old male with:    ICD-10-CM    1. ASNHL (asymmetrical sensorineural hearing loss)  H90.5 MR Internal Auditory Canal wo&w Contrast     AUDIOLOGY ADULT REFERRAL   2. Ear fullness, bilateral  H93.8X3    3. Anxiety  F41.9    4. Gastroesophageal reflux disease, esophagitis presence not specified  K21.9          reassured normal ear exam but due to ASNHL, must exclude  AN/VS    I also discussed the indication for MRI of the Brain and Internal Auditory Canals.    The possibility of acoustic neuroma causing asymmetrical nerve hearing loss was discussed.  The patient was told acoustic neuromas are very rare, benign, and generally treated by observation only.    MRI IAC pending    Terry is seeing a therapist and I encouraged him to continue to talk to his therapist on a regular basis especially with Covid and   personal distancing precautions  States he talks with his girlfriend all the time.  Offered a referral to local therapists but he declined    If he develops persistent weight loss or uncontrolled heartburn and globus occurs he should present back for office transnasal esophagoscopy and have a preprocedural barium swallow and/or see general surgery for an EGD.  Looks like I have more room going to the left nares        Ilene Morales D.O.  Otolaryngology/Head and Neck Surgery  Allergy      Again, thank you for allowing me to participate in the care of your patient.        Sincerely,        Ilene Morales MD

## 2020-05-18 NOTE — PROGRESS NOTES
Audiology Evaluation Completed. Please refer SCANNED AUDIOGRAM and/or TYMPANOGRAM for BACKGROUND, RESULTS, RECOMMENDATIONS.      Kori ISIDRO, [facility]  Audiologist #[id number]

## 2020-05-18 NOTE — PROGRESS NOTES
Otolaryngology Progress Note  Patient: Terry Kemp  : 1960    Patient presents with:  RECHECK: Follow Up GERD, Bilateral Ear Pressure      HPI:  Terry Kemp is a 59 year old male seen today for bilateral aural fullness  Over the past month and a half he feels on sensation of building pressure in both of his ears as if a balloon will pop  He denies fluctuating hearing loss or any noticeable hearing loss  He denies tinnitus or vertigo    Significant history of anxiety secondary to history of his daughter's suicide several years ago in April.      Over the past 3 to 4 months he has had significant worsening of his reflux which is now well controlled with Protonix Carafate and Tums.     His globus pharyngeus has resolve  His reflux has resolved  He has lost weight about 15 pounds in 3 months which she relates to not eating as much when he had bad reflux but also anxiety  He states he does not sleep well at night but denies sleep apnea    Never tobacco user  No otalgia, but ear pressure  No known bruxism    Prn use of Neilmed saline and Flonase.      No prior audiograms.    He saw Zaida on  for a virtual visit.  prior to this he had another virtual visit on 323 with [facility]    Audiogram todays date:  Normal thresholds sloping to a moderate to severe left asymmetrical sensorineural hearing loss and a mild right high-frequency sensorineural hearing loss type a tympanograms bilaterally 100% discrimination SRT 10 dB right 15 dB left    Current Outpatient Rx   Medication Sig Dispense Refill     atorvastatin (LIPITOR) 10 MG tablet Take 1 tablet (10 mg) by mouth daily 90 tablet 3     busPIRone (BUSPAR) 10 MG tablet Take 1 tablet (10 mg) by mouth 3 times daily 90 tablet 1     fluticasone (FLONASE) 50 MCG/ACT nasal spray Spray 1 spray into both nostrils daily       pantoprazole (PROTONIX) 40 MG EC tablet Take 1 tablet (40 mg) by mouth daily 30 tablet 3     sucralfate (CARAFATE) 1 GM tablet Take 1 tablet (1 g) by mouth 4  "times daily 120 tablet 1       Allergies: Patient has no known allergies.     Past Medical History:   Diagnosis Date     Hyperlipidemia     6/11/2013     Male erectile dysfunction     6/11/2013       Past Surgical History:   Procedure Laterality Date     ARTHROSCOPY SHOULDER      rt shoulder x two     COLONOSCOPY      2009,normal     EXCISE CYST GENERIC (LOCATION)      7/8/13,mucous cyst excision, right index finger. Dr. Bajwa     KNEE SURGERY      arthroscopy       OTHER SURGICAL HISTORY      7/8/13,69734.0,IA EXC SKIN MALIG < 0.5 CM FACE EARS EYELIDS,Serleth, left eye       ENT family history reviewed    Social History     Tobacco Use     Smoking status: Never Smoker     Smokeless tobacco: Never Used   Substance Use Topics     Alcohol use: Not Currently     Alcohol/week: 0.0 standard drinks     Drug use: Never     Comment: Drug use: No       Review of Systems  ROS: 10 point ROS neg other than the symptoms noted above in the HPI and shortness of breath on exertion heartburn tinnitus ear plugging occasional loss of balance, history of depression and anxiety     Physical Exam  /72   Pulse 94   Temp 97.1  F (36.2  C) (Tympanic)   Ht 1.689 m (5' 6.5\")   Wt 74.8 kg (165 lb)   SpO2 97%   BMI 26.23 kg/m    General - The patient is well nourished and well developed, and appears to have good nutritional status.  Alert and oriented to person and place, answers questions and cooperates with examination appropriately.  anxious  Head and Face - Normocephalic and atraumatic, with no gross asymmetry noted.  The facial nerve is intact grade 1/6 bilaterally, with strong symmetric movements.  Gait intact,   No spontaneous nystagmus  Voice and Breathing - The patient was breathing comfortably without the use of accessory muscles. There was no wheezing, stridor, or stertor.  The patients voice was clear and strong, and had appropriate pitch and quality.  No stephenie peripheral digital clubbing or cyanosis   Ears -examined " under microscopy bilaterally  Cerumen removed from left EAC with pick and alligator. The external auditory canals are patent, the tympanic membranes are intact without effusion, retraction or mass.  Bony landmarks are intact.  TMJ without crepitus, with click bilaterally, nontender  Eyes - Extraocular movements intact, and the pupils were reactive to light.  Sclera were not icteric or injected, conjunctiva were pink and moist.  Mouth - Examination of the oral cavity showed pink, healthy oral mucosa. No lesions or ulcerations noted.  The tongue was mobile and midline, and the dentition were in good condition.    Throat - The walls of the oropharynx were smooth, pink, moist, symmetric, and had no lesions or ulcerations.  The tonsillar pillars and soft palate were symmetric.  The uvula was midline on elevation.    Neck - No palpable enlarged fixed cervical lymph nodes.  No neck cysts or unusual tenderness to palpation.   No palpable fixed thyroid nodules or concerning goiter.  The trachea is grossly midline.   Nose - External contour is symmetric, no gross deflection or scars.  Nasal mucosa is pink and moist with no abnormal mucus.  The septum and turbinates were evaluated: DNS right, mild ith bilaterally.  No polyps, masses, or purulence noted on examination.      Impression and Plan- Terry Kemp is a 59 year old male with:    ICD-10-CM    1. ASNHL (asymmetrical sensorineural hearing loss)  H90.5 MR Internal Auditory Canal wo&w Contrast     AUDIOLOGY ADULT REFERRAL   2. Ear fullness, bilateral  H93.8X3    3. Anxiety  F41.9    4. Gastroesophageal reflux disease, esophagitis presence not specified  K21.9          reassured normal ear exam but due to ASNHL, must exclude AN/VS    I also discussed the indication for MRI of the Brain and Internal Auditory Canals.    The possibility of acoustic neuroma causing asymmetrical nerve hearing loss was discussed.  The patient was told acoustic neuromas are very rare, benign, and  generally treated by observation only.    MRI IAC pending    Terry is seeing a therapist and I encouraged him to continue to talk to his therapist on a regular basis especially with Covid and   personal distancing precautions  States he talks with his girlfriend all the time.  Offered a referral to local therapists but he declined    If he develops persistent weight loss or uncontrolled heartburn and globus occurs he should present back for office transnasal esophagoscopy and have a preprocedural barium swallow and/or see general surgery for an EGD.  Looks like I have more room going to the left nares        Ilene Morales D.O.  Otolaryngology/Head and Neck Surgery  Allergy

## 2020-05-18 NOTE — TELEPHONE ENCOUNTER
Patient referred by Dr. Oscar for a Upper GI endoscopy ,  Diagnosis is gastroesophageal reflux disease.  Please advise.  Thank you. Carla Jacome on 5/18/2020 at 8:52 AM

## 2020-05-19 ENCOUNTER — MYC MEDICAL ADVICE (OUTPATIENT)
Dept: OTOLARYNGOLOGY | Facility: OTHER | Age: 60
End: 2020-05-19

## 2020-05-20 ENCOUNTER — TELEPHONE (OUTPATIENT)
Dept: FAMILY MEDICINE | Facility: OTHER | Age: 60
End: 2020-05-20

## 2020-05-20 ENCOUNTER — TELEPHONE (OUTPATIENT)
Dept: SURGERY | Facility: OTHER | Age: 60
End: 2020-05-20

## 2020-05-20 ENCOUNTER — MYC MEDICAL ADVICE (OUTPATIENT)
Dept: FAMILY MEDICINE | Facility: OTHER | Age: 60
End: 2020-05-20

## 2020-05-20 DIAGNOSIS — Z01.812 PRE-PROCEDURE LAB EXAM: Primary | ICD-10-CM

## 2020-05-20 NOTE — TELEPHONE ENCOUNTER
Please place lab orders for patient prior to MRI on 05/22/20.  Jillian Knott LPN............5/20/2020 1:49 PM

## 2020-05-20 NOTE — TELEPHONE ENCOUNTER
Screening Questions for the Scheduling of Screening Colonoscopies   (If Colonoscopy is diagnostic, Provider should review the chart before scheduling.)  Are you younger than 50 or older than 80?  NO   Do you take aspirin or fish oil?  NO  (if yes, tell patient to stop 1 week prior to Colonoscopy)  Do you take warfarin (Coumadin), clopidogrel (Plavix), apixaban (Eliquis), dabigatram (Pradaxa), rivaroxaban (Xarelto) or any blood thinner? NO  Do you use oxygen at home?  NO   Do you have kidney disease? NO   Are you on dialysis? NO   Have you had a stroke or heart attack in the last year? NO   Have you had a stent in your heart or any blood vessel in the last year? NO   Have you had a transplant of any organ? NO   Have you had a colonoscopy or upper endoscopy (EGD) before? NO          When?    Date of scheduled EGD / BRAVO  06/25/2020  Provider  [facility]   Pharmacy

## 2020-05-21 ENCOUNTER — MYC MEDICAL ADVICE (OUTPATIENT)
Dept: FAMILY MEDICINE | Facility: OTHER | Age: 60
End: 2020-05-21

## 2020-05-21 DIAGNOSIS — F40.240 CLAUSTROPHOBIA: Primary | ICD-10-CM

## 2020-05-22 ENCOUNTER — HOSPITAL ENCOUNTER (OUTPATIENT)
Dept: MRI IMAGING | Facility: OTHER | Age: 60
End: 2020-05-22
Attending: OTOLARYNGOLOGY
Payer: COMMERCIAL

## 2020-05-22 ENCOUNTER — MYC MEDICAL ADVICE (OUTPATIENT)
Dept: FAMILY MEDICINE | Facility: OTHER | Age: 60
End: 2020-05-22

## 2020-05-22 ENCOUNTER — VIRTUAL VISIT (OUTPATIENT)
Dept: FAMILY MEDICINE | Facility: OTHER | Age: 60
End: 2020-05-22
Attending: FAMILY MEDICINE
Payer: COMMERCIAL

## 2020-05-22 ENCOUNTER — HOSPITAL ENCOUNTER (OUTPATIENT)
Facility: OTHER | Age: 60
End: 2020-05-22
Attending: SURGERY | Admitting: SURGERY
Payer: COMMERCIAL

## 2020-05-22 DIAGNOSIS — G47.00 INSOMNIA, UNSPECIFIED TYPE: Primary | ICD-10-CM

## 2020-05-22 DIAGNOSIS — H90.3 ASNHL (ASYMMETRICAL SENSORINEURAL HEARING LOSS): ICD-10-CM

## 2020-05-22 DIAGNOSIS — Z01.812 PRE-PROCEDURE LAB EXAM: ICD-10-CM

## 2020-05-22 DIAGNOSIS — K21.9 GASTROESOPHAGEAL REFLUX DISEASE WITHOUT ESOPHAGITIS: ICD-10-CM

## 2020-05-22 DIAGNOSIS — F41.9 ANXIETY: ICD-10-CM

## 2020-05-22 LAB
CREAT SERPL-MCNC: 1.03 MG/DL (ref 0.7–1.3)
GFR SERPL CREATININE-BSD FRML MDRD: 73 ML/MIN/{1.73_M2}

## 2020-05-22 PROCEDURE — 82565 ASSAY OF CREATININE: CPT | Mod: ZL | Performed by: FAMILY MEDICINE

## 2020-05-22 PROCEDURE — 36415 COLL VENOUS BLD VENIPUNCTURE: CPT | Mod: ZL | Performed by: FAMILY MEDICINE

## 2020-05-22 PROCEDURE — 70543 MRI ORBT/FAC/NCK W/O &W/DYE: CPT

## 2020-05-22 PROCEDURE — A9575 INJ GADOTERATE MEGLUMI 0.1ML: HCPCS | Performed by: OTOLARYNGOLOGY

## 2020-05-22 PROCEDURE — 99213 OFFICE O/P EST LOW 20 MIN: CPT | Mod: 95 | Performed by: FAMILY MEDICINE

## 2020-05-22 PROCEDURE — 25500064 ZZH RX 255 OP 636: Performed by: OTOLARYNGOLOGY

## 2020-05-22 RX ORDER — LORAZEPAM 0.5 MG/1
TABLET ORAL
Qty: 2 TABLET | Refills: 0 | Status: SHIPPED | OUTPATIENT
Start: 2020-05-22 | End: 2020-05-27

## 2020-05-22 RX ADMIN — GADOTERATE MEGLUMINE 15 ML: 376.9 INJECTION INTRAVENOUS at 11:04

## 2020-05-22 ASSESSMENT — ANXIETY QUESTIONNAIRES
2. NOT BEING ABLE TO STOP OR CONTROL WORRYING: MORE THAN HALF THE DAYS
1. FEELING NERVOUS, ANXIOUS, OR ON EDGE: SEVERAL DAYS
3. WORRYING TOO MUCH ABOUT DIFFERENT THINGS: NEARLY EVERY DAY
IF YOU CHECKED OFF ANY PROBLEMS ON THIS QUESTIONNAIRE, HOW DIFFICULT HAVE THESE PROBLEMS MADE IT FOR YOU TO DO YOUR WORK, TAKE CARE OF THINGS AT HOME, OR GET ALONG WITH OTHER PEOPLE: SOMEWHAT DIFFICULT
GAD7 TOTAL SCORE: 8
6. BECOMING EASILY ANNOYED OR IRRITABLE: NOT AT ALL
7. FEELING AFRAID AS IF SOMETHING AWFUL MIGHT HAPPEN: NOT AT ALL
5. BEING SO RESTLESS THAT IT IS HARD TO SIT STILL: SEVERAL DAYS

## 2020-05-22 ASSESSMENT — PAIN SCALES - GENERAL: PAINLEVEL: NO PAIN (0)

## 2020-05-22 ASSESSMENT — PATIENT HEALTH QUESTIONNAIRE - PHQ9
SUM OF ALL RESPONSES TO PHQ QUESTIONS 1-9: 11
5. POOR APPETITE OR OVEREATING: SEVERAL DAYS

## 2020-05-22 NOTE — NURSING NOTE
Patient is needing visit today for sleep issues.   Medication Reconciliation Complete    Dottie Grubbs LPN  5/22/2020 9:26 AM

## 2020-05-22 NOTE — PROGRESS NOTES
"Terry Kemp is a 59 year old male who is being evaluated via a billable telephone visit.      Nursing Notes:   Dottie Grubbs, LPN  5/22/2020  9:32 AM  Signed  Patient is needing visit today for sleep issues.   Medication Reconciliation Complete    Dottie MARCIAEmily Grubbs LPN  5/22/2020 9:26 AM    The patient has been notified of following:     \"This telephone visit will be conducted via a call between you and your physician/provider. We have found that certain health care needs can be provided without the need for a physical exam.  This service lets us provide the care you need with a short phone conversation.  If a prescription is necessary we can send it directly to your pharmacy.  If lab work is needed we can place an order for that and you can then stop by our lab to have the test done at a later time.    Telephone visits are billed at different rates depending on your insurance coverage. During this emergency period, for some insurers they may be billed the same as an in-person visit.  Please reach out to your insurance provider with any questions.    If during the course of the call the physician/provider feels a telephone visit is not appropriate, you will not be charged for this service.\"    Patient has given verbal consent for Telephone visit?  Yes    What phone number would you like to be contacted at? [phone]    How would you like to obtain your AVS? Jani Calderón     Terry Kemp is a 59 year old male who presents via phone visit today for the following health issues:    HPI    Terry is wanting to talk about sleep today. He is happy to report that he got 6 hours of sleep last night, \"drug induced\" with temazepam but he took 2 of them. To bed at 2020 until 0530. When he first started taking temazepam, it took really long to kick in. Making a nighttime ritual of going to bed at 10:00 after an hour of Seinfeld. Sometimes he dozes on the couch but then wakes up when he moves to the bed. He " "frequently feels that he is almost asleep, so relaxed, then gets excited and wakes up. After laying there for a few hours, he feels stressed. His brother (who has the same anxiety about sleeping) is taking Toprol for sleep and it's amazing.      He reports these feeling of anxiety during the day time. He doesn't know if the buspirone is working or not. He thinks it is maybe working because he's more calm, especially with stress of sleeping. He is taking the buspirone TID but only for 2-3 days.     Patient also concerned about weight. He has been trying to stay healthy but gets mad about people telling him he looks skinny or \"needs to eat\". He used to be husky and muscular but now he is thinner. He is eating less and making healthier choices. He wonders if he should \"freak out about his weight\" but is happy with his progress.     He also reports having an EGD in June. He is taking pantoprazole and sucralfate which has completely resolved symptoms. He does worry about maybe having a cancer since he is losing weight, but does acknowledge that he is purposely trying to lose weight with diet and exercise. He is concerned that he might look like a freak driving up for the COVID test and having the EGD.     Shantelle thinks he might have an adjustment disorder. She doesn't think he has PTSD, but definitely anxiety.            Review of Systems   Constitutional, HEENT, cardiovascular, pulmonary, gi and gu systems are negative, except as otherwise noted.       Objective   Reported vitals:  There were no vitals taken for this visit.   healthy, alert and no distress  PSYCH: Alert and oriented times 3; coherent speech, normal   rate and volume, able to articulate logical thoughts, able   to abstract reason, no tangential thoughts, no hallucinations   or delusions  His affect is normal  RESP: No cough, no audible wheezing, able to talk in full sentences  Remainder of exam unable to be completed due to telephone visits    Diagnostic " Test Results: none         Assessment/Plan:    1. Insomnia, unspecified type  2. Anxiety    --Discussed sleep hygiene, bedtime routine, trouble shooting wakefulness, set aside worry time   --No changes to medications at this time  --Recommended CBT for insomnia with Shantelle if she is able to do this     3. GERD without Esophagitis    --Recommended to continue taking the medications pantoprazole and sucralfate to complete their course  --OK to discontinue EGD if patient would like due to symptoms resolution  --Discussed that cancer would likely not present like this and not be resolved by symptoms    0940 to 1001  Phone call duration:  21 minutes    Note has been created by Resident Stephanie Jimenez MD PGY2. Please see additional comments below by attending Dr. Oscar. Note has been sent for her review.     Stephanie Jimenez MD on 5/22/2020 at 9:59 AM      Above reviewed and discussed with 2nd year FR Resident, Dr. Stephanie Jimenez. Patient interviewed and examined by myself. Agree with above plan.       Leah Oscar MD

## 2020-05-23 ASSESSMENT — ANXIETY QUESTIONNAIRES: GAD7 TOTAL SCORE: 8

## 2020-05-25 ENCOUNTER — MYC MEDICAL ADVICE (OUTPATIENT)
Dept: FAMILY MEDICINE | Facility: OTHER | Age: 60
End: 2020-05-25

## 2020-05-27 ENCOUNTER — VIRTUAL VISIT (OUTPATIENT)
Dept: FAMILY MEDICINE | Facility: OTHER | Age: 60
End: 2020-05-27
Attending: FAMILY MEDICINE
Payer: COMMERCIAL

## 2020-05-27 DIAGNOSIS — G47.00 INSOMNIA, UNSPECIFIED TYPE: ICD-10-CM

## 2020-05-27 DIAGNOSIS — F41.9 ANXIETY: Primary | ICD-10-CM

## 2020-05-27 PROCEDURE — 99214 OFFICE O/P EST MOD 30 MIN: CPT | Mod: 95 | Performed by: FAMILY MEDICINE

## 2020-05-27 RX ORDER — CITALOPRAM HYDROBROMIDE 20 MG/1
TABLET ORAL
Qty: 30 TABLET | Refills: 1 | Status: SHIPPED | OUTPATIENT
Start: 2020-05-27 | End: 2020-06-19 | Stop reason: DRUGHIGH

## 2020-05-27 RX ORDER — TEMAZEPAM 30 MG
30 CAPSULE ORAL
Qty: 30 CAPSULE | Refills: 1 | Status: SHIPPED | OUTPATIENT
Start: 2020-05-27 | End: 2020-06-19 | Stop reason: DRUGHIGH

## 2020-05-27 ASSESSMENT — PAIN SCALES - GENERAL: PAINLEVEL: NO PAIN (0)

## 2020-05-27 NOTE — NURSING NOTE
"Chief Complaint   Patient presents with     RECHECK     Anxiety         Initial There were no vitals taken for this visit. Estimated body mass index is 26.23 kg/m  as calculated from the following:    Height as of 5/18/20: 1.689 m (5' 6.5\").    Weight as of 5/18/20: 74.8 kg (165 lb).    Medication Reconciliation: complete      Norma J. Gosselin, LPN  "

## 2020-05-27 NOTE — PROGRESS NOTES
"Terry Kemp is a 59 year old male who is being evaluated via a billable telephone visit.      The patient has been notified of following:     \"This telephone visit will be conducted via a call between you and your physician/provider. We have found that certain health care needs can be provided without the need for a physical exam.  This service lets us provide the care you need with a short phone conversation.  If a prescription is necessary we can send it directly to your pharmacy.  If lab work is needed we can place an order for that and you can then stop by our lab to have the test done at a later time.    Telephone visits are billed at different rates depending on your insurance coverage. During this emergency period, for some insurers they may be billed the same as an in-person visit.  Please reach out to your insurance provider with any questions.    If during the course of the call the physician/provider feels a telephone visit is not appropriate, you will not be charged for this service.\"    Patient has given verbal consent for Telephone visit?  Yes    What phone number would you like to be contacted at? [phone]    How would you like to obtain your AVS? Jani    Subjective     Terry Kemp is a 59 year old male who presents via phone visit today for the following health issues:    RECHECK  Anxiety    HPI     Patient with worsening anxiety since increasing buspar to TID. Having more panic attacks, ongoing issues with sleep.     Significant weight loss since making healthy dietary changes, wonders if he can stop the Lipitor.     Improvement in GERD symptoms, wonders if he can stop the Carafate.     Doesn't feel like he is connecting with his therapist.     Feels better about work up for his hearing issues, recent negative MRI.     Notes that he tends to perseverate on symptoms.     Review of Systems   Constitutional, HEENT, cardiovascular, pulmonary, gi and gu systems are negative, except as otherwise " noted.       Objective   Reported vitals:  There were no vitals taken for this visit.   healthy, alert and no distress  PSYCH: Alert and oriented times 3; coherent speech, normal   rate and volume, able to articulate logical thoughts, able   to abstract reason, no tangential thoughts, no hallucinations   or delusions  His affect is normal  RESP: No cough, no audible wheezing, able to talk in full sentences  Remainder of exam unable to be completed due to telephone visits    KASEY-7 SCORE 5/8/2020 5/22/2020   Total Score 12 8               Assessment/Plan:  1. Insomnia, unspecified type    - temazepam (RESTORIL) 30 MG capsule; Take 1 capsule (30 mg) by mouth nightly as needed for sleep  Dispense: 30 capsule; Refill: 1    2. Anxiety    - citalopram (CELEXA) 20 MG tablet; 1/ tab daily x 6 days, then 1 tab daily.  Dispense: 30 tablet; Refill: 1    Recommend starting Celexa. This has worked well for him in the past.   Stop buspar.  Stop carafate. Continue PPI.   Stop Lipitor. Repeat lipids in the fall and determine if still needed based on his risk factors, weight loss.   Reviewed breathing exercises, relaxation techniques.       No follow-ups on file.      Phone call duration:  25 minutes    Leah Oscar MD

## 2020-05-28 ENCOUNTER — VIRTUAL VISIT (OUTPATIENT)
Dept: FAMILY MEDICINE | Facility: OTHER | Age: 60
End: 2020-05-28
Attending: PHYSICIAN ASSISTANT
Payer: COMMERCIAL

## 2020-05-28 VITALS — WEIGHT: 154 LBS | BODY MASS INDEX: 24.48 KG/M2

## 2020-05-28 DIAGNOSIS — F41.9 ANXIETY: Primary | ICD-10-CM

## 2020-05-28 PROCEDURE — 99213 OFFICE O/P EST LOW 20 MIN: CPT | Mod: 95 | Performed by: PHYSICIAN ASSISTANT

## 2020-05-28 RX ORDER — LORAZEPAM 0.5 MG/1
0.5 TABLET ORAL 2 TIMES DAILY PRN
Qty: 40 TABLET | Refills: 0 | Status: SHIPPED | OUTPATIENT
Start: 2020-05-28 | End: 2020-07-23

## 2020-05-28 ASSESSMENT — PATIENT HEALTH QUESTIONNAIRE - PHQ9
SUM OF ALL RESPONSES TO PHQ QUESTIONS 1-9: 15
5. POOR APPETITE OR OVEREATING: NEARLY EVERY DAY

## 2020-05-28 ASSESSMENT — PAIN SCALES - GENERAL: PAINLEVEL: NO PAIN (0)

## 2020-05-28 ASSESSMENT — ANXIETY QUESTIONNAIRES
7. FEELING AFRAID AS IF SOMETHING AWFUL MIGHT HAPPEN: MORE THAN HALF THE DAYS
1. FEELING NERVOUS, ANXIOUS, OR ON EDGE: NEARLY EVERY DAY
6. BECOMING EASILY ANNOYED OR IRRITABLE: SEVERAL DAYS
GAD7 TOTAL SCORE: 17
3. WORRYING TOO MUCH ABOUT DIFFERENT THINGS: NEARLY EVERY DAY
2. NOT BEING ABLE TO STOP OR CONTROL WORRYING: NEARLY EVERY DAY
5. BEING SO RESTLESS THAT IT IS HARD TO SIT STILL: MORE THAN HALF THE DAYS
IF YOU CHECKED OFF ANY PROBLEMS ON THIS QUESTIONNAIRE, HOW DIFFICULT HAVE THESE PROBLEMS MADE IT FOR YOU TO DO YOUR WORK, TAKE CARE OF THINGS AT HOME, OR GET ALONG WITH OTHER PEOPLE: SOMEWHAT DIFFICULT

## 2020-05-28 NOTE — PROGRESS NOTES
"Terry Kemp is a 59 year old male who is being evaluated via a billable video visit.      The patient has been notified of following:     \"This video visit will be conducted via a call between you and your physician/provider. We have found that certain health care needs can be provided without the need for an in-person physical exam.  This service lets us provide the care you need with a video conversation.  If a prescription is necessary we can send it directly to your pharmacy.  If lab work is needed we can place an order for that and you can then stop by our lab to have the test done at a later time.    Video visits are billed at different rates depending on your insurance coverage.  Please reach out to your insurance provider with any questions.    If during the course of the call the physician/provider feels a video visit is not appropriate, you will not be charged for this service.\"    Patient has given verbal consent for Video visit? Yes    How would you like to obtain your AVS? [place]    Patient would like the video invitation sent by: Text to cell phone: [phone]    Will anyone else be joining your video visit? No    Subjective     Terry Kemp is a 59 year old male who presents today via video visit for the following health issues:    HPI  Depression and Anxiety Follow-Up    How are you doing with your depression since your last visit? Worsened     How are you doing with your anxiety since your last visit?  Worsened     Are you having other symptoms that might be associated with depression or anxiety? Yes:  See PHQ, KASEY    Have you had a significant life event? OTHER: alot in the last few years     Do you have any concerns with your use of alcohol or other drugs? No    Social History     Tobacco Use     Smoking status: Never Smoker     Smokeless tobacco: Never Used   Substance Use Topics     Alcohol use: Not Currently     Alcohol/week: 0.0 standard drinks     Drug use: Never     Comment: Drug use: No "     PHQ 5/8/2020 5/22/2020 5/28/2020   PHQ-9 Total Score 11 11 15   Q9: Thoughts of better off dead/self-harm past 2 weeks Not at all Not at all Not at all     KASEY-7 SCORE 5/8/2020 5/22/2020 5/28/2020   Total Score 12 8 17     Last PHQ-9 5/28/2020   1.  Little interest or pleasure in doing things 1   2.  Feeling down, depressed, or hopeless 2   3.  Trouble falling or staying asleep, or sleeping too much 3   4.  Feeling tired or having little energy 2   5.  Poor appetite or overeating 2   6.  Feeling bad about yourself 3   7.  Trouble concentrating 1   8.  Moving slowly or restless 1   Q9: Thoughts of better off dead/self-harm past 2 weeks 0   PHQ-9 Total Score 15   Difficulty at work, home, or with people Somewhat difficult     KASEY-7  5/28/2020   1. Feeling nervous, anxious, or on edge 3   2. Not being able to stop or control worrying 3   3. Worrying too much about different things 3   4. Trouble relaxing 3   5. Being so restless that it is hard to sit still 2   6. Becoming easily annoyed or irritable 1   7. Feeling afraid, as if something awful might happen 2   KASEY-7 Total Score 17   If you checked any problems, how difficult have they made it for you to do your work, take care of things at home, or get along with other people? Somewhat difficult         Suicide Assessment Five-step Evaluation and Treatment (SAFE-T)      How many servings of fruits and vegetables do you eat daily?  2-3    On average, how many sweetened beverages do you drink each day (Examples: soda, juice, sweet tea, etc.  Do NOT count diet or artificially sweetened beverages)?   0    How many days per week do you exercise enough to make your heart beat faster? 5    How many minutes a day do you exercise enough to make your heart beat faster? 60 or more    How many days per week do you miss taking your medication? 0         Video Start Time: 9:20        Patient Active Problem List   Diagnosis     Hyperlipemia     Synovial cyst     Old peripheral  "tear of medial meniscus of left knee     Anxiety     Gastroesophageal reflux disease without esophagitis     Past Surgical History:   Procedure Laterality Date     ARTHROSCOPY SHOULDER      rt shoulder x two     COLONOSCOPY      2009,normal     EXCISE CYST GENERIC (LOCATION)      7/8/13,mucous cyst excision, right index finger. Dr. Bajwa     KNEE SURGERY      arthroscopy       OTHER SURGICAL HISTORY      7/8/13,91710.0,CT EXC SKIN MALIG < 0.5 CM FACE EARS EYELIDS,[facility], left eye       Social History     Tobacco Use     Smoking status: Never Smoker     Smokeless tobacco: Never Used   Substance Use Topics     Alcohol use: Not Currently     Alcohol/week: 0.0 standard drinks     Family History   Problem Relation Age of Onset     Coronary Artery Disease Mother      Lung Cancer Father         smoker     No Known Problems Sister      No Known Problems Brother      Colitis Half-Brother         colostomy bag         Current Outpatient Medications   Medication Sig Dispense Refill     citalopram (CELEXA) 20 MG tablet 1/ tab daily x 6 days, then 1 tab daily. 30 tablet 1     fluticasone (FLONASE) 50 MCG/ACT nasal spray Spray 1 spray into both nostrils daily       LORazepam (ATIVAN) 0.5 MG tablet Take 1 tablet (0.5 mg) by mouth 2 times daily as needed for anxiety 40 tablet 0     pantoprazole (PROTONIX) 40 MG EC tablet Take 1 tablet (40 mg) by mouth daily 30 tablet 3     temazepam (RESTORIL) 30 MG capsule Take 1 capsule (30 mg) by mouth nightly as needed for sleep 30 capsule 1     No Known Allergies    Reviewed and updated as needed this visit by Provider         Review of Systems   Constitutional, HEENT, cardiovascular, pulmonary, gi and gu systems are negative, except as otherwise noted.      Objective    Wt 69.9 kg (154 lb)   BMI 24.48 kg/m    Estimated body mass index is 24.48 kg/m  as calculated from the following:    Height as of 5/18/20: 1.689 m (5' 6.5\").    Weight as of this encounter: 69.9 kg (154 lb).  Physical " Exam   Patient has a several month history of depressed mood and anxiety. Lack of concentration, anhedonia and sleep disturbance. He has anamika having panic attacks almost daily in past 1-2 weeks. History of dysthymia after death of his daughter. He got good benefit from Citalopram. He just got a new rx for same.Psych: Mood is predominately euthymic with corresponding affect. Normal rate, tone and volume of speech. Goal directed thought process. Normal thought content. Patient shows good insight and judgement. Denies homicidal or suicidal ideation, intent or plan.  PHQ-9 score 15. KASEY score 17.                (F41.9) Anxiety  (primary encounter diagnosis)  Comment: After 6 days increase Citalopram to 20 mg daily with 3 week f/u. Sooner prn. Ativan bid prn for panic attack  Plan: LORazepam (ATIVAN) 0.5 MG tablet            Video-Visit Details    Type of service:  Video Visit    Video End Time:9:10-9:30    Originating Location (pt. Location): Home    Distant Location (provider location):  [facility]     Platform used for Video Visit: Emil    No follow-ups on file.       LAST [place]

## 2020-05-28 NOTE — NURSING NOTE
"Chief Complaint   Patient presents with     Anxiety       Initial Wt 69.9 kg (154 lb)   BMI 24.48 kg/m   Estimated body mass index is 24.48 kg/m  as calculated from the following:    Height as of 5/18/20: 1.689 m (5' 6.5\").    Weight as of this encounter: 69.9 kg (154 lb).  Medication Reconciliation: complete  Charley Webster LPN    "

## 2020-05-29 ASSESSMENT — ANXIETY QUESTIONNAIRES: GAD7 TOTAL SCORE: 17

## 2020-06-01 ENCOUNTER — MYC MEDICAL ADVICE (OUTPATIENT)
Dept: FAMILY MEDICINE | Facility: OTHER | Age: 60
End: 2020-06-01

## 2020-06-01 DIAGNOSIS — K21.9 GASTROESOPHAGEAL REFLUX DISEASE WITHOUT ESOPHAGITIS: ICD-10-CM

## 2020-06-01 RX ORDER — SUCRALFATE 1 G/1
1 TABLET ORAL 4 TIMES DAILY
Qty: 120 TABLET | Refills: 1 | Status: SHIPPED | OUTPATIENT
Start: 2020-06-01 | End: 2020-08-06

## 2020-06-02 RX ORDER — SUCRALFATE 1 G/1
TABLET ORAL
Qty: 120 TABLET | Refills: 1 | OUTPATIENT
Start: 2020-06-02

## 2020-06-02 NOTE — TELEPHONE ENCOUNTER
Filled 06/01/2020 #120 x 1. Pharmacy verified receipt of refill. Unable to complete prescription refill per RNMedication Refill Policy.................... Amanda Blevins RN ....................  6/2/2020   10:46 AM      sucralfate (CARAFATE) 1 GM tablet  120 tablet  1  6/1/2020   No    Sig - Route: Take 1 tablet (1 g) by mouth 4 times daily - Oral    Sent to pharmacy as: Sucralfate 1 GM Oral Tablet (CARAFATE)    Class: E-Prescribe    Order: [id number]    E-Prescribing Status: Receipt confirmed by pharmacy (6/1/2020  4:50 PM CDT)    Printout Tracking     External Result Report    Pharmacy     [id number] - [place], [place] - [address]

## 2020-06-05 ENCOUNTER — MYC MEDICAL ADVICE (OUTPATIENT)
Dept: FAMILY MEDICINE | Facility: OTHER | Age: 60
End: 2020-06-05

## 2020-06-05 NOTE — TELEPHONE ENCOUNTER
I did not see Trazadone on his med list. Can you address this?   Jackelyn Hsieh, LPN on 6/5/2020 at 2:20 PM

## 2020-06-08 ENCOUNTER — MYC MEDICAL ADVICE (OUTPATIENT)
Dept: FAMILY MEDICINE | Facility: OTHER | Age: 60
End: 2020-06-08

## 2020-06-08 ENCOUNTER — TELEPHONE (OUTPATIENT)
Dept: FAMILY MEDICINE | Facility: OTHER | Age: 60
End: 2020-06-08

## 2020-06-08 NOTE — TELEPHONE ENCOUNTER
States he received a message on [employer] but is unable to see it. He said he would like the message either sent by text to him if possible or sent to his e-mail which is [email or web address]

## 2020-06-11 ENCOUNTER — MYC MEDICAL ADVICE (OUTPATIENT)
Dept: FAMILY MEDICINE | Facility: OTHER | Age: 60
End: 2020-06-11

## 2020-06-11 DIAGNOSIS — K21.9 GASTROESOPHAGEAL REFLUX DISEASE WITHOUT ESOPHAGITIS: Primary | ICD-10-CM

## 2020-06-19 ENCOUNTER — VIRTUAL VISIT (OUTPATIENT)
Dept: FAMILY MEDICINE | Facility: OTHER | Age: 60
End: 2020-06-19
Attending: PHYSICIAN ASSISTANT
Payer: COMMERCIAL

## 2020-06-19 DIAGNOSIS — G47.00 INSOMNIA, UNSPECIFIED TYPE: ICD-10-CM

## 2020-06-19 DIAGNOSIS — F41.9 ANXIETY: Primary | ICD-10-CM

## 2020-06-19 PROCEDURE — 99213 OFFICE O/P EST LOW 20 MIN: CPT | Mod: 95 | Performed by: PHYSICIAN ASSISTANT

## 2020-06-19 RX ORDER — CITALOPRAM HYDROBROMIDE 40 MG/1
40 TABLET ORAL DAILY
Qty: 30 TABLET | Refills: 1 | Status: SHIPPED | OUTPATIENT
Start: 2020-06-19 | End: 2020-06-22 | Stop reason: ALTCHOICE

## 2020-06-19 RX ORDER — TEMAZEPAM 22.5 MG/1
22.5 CAPSULE ORAL
Qty: 30 CAPSULE | Refills: 1 | Status: SHIPPED | OUTPATIENT
Start: 2020-06-19 | End: 2020-06-22 | Stop reason: DRUGHIGH

## 2020-06-19 ASSESSMENT — ANXIETY QUESTIONNAIRES
5. BEING SO RESTLESS THAT IT IS HARD TO SIT STILL: NOT AT ALL
2. NOT BEING ABLE TO STOP OR CONTROL WORRYING: SEVERAL DAYS
3. WORRYING TOO MUCH ABOUT DIFFERENT THINGS: SEVERAL DAYS
7. FEELING AFRAID AS IF SOMETHING AWFUL MIGHT HAPPEN: NOT AT ALL
6. BECOMING EASILY ANNOYED OR IRRITABLE: NOT AT ALL
GAD7 TOTAL SCORE: 3
1. FEELING NERVOUS, ANXIOUS, OR ON EDGE: SEVERAL DAYS
IF YOU CHECKED OFF ANY PROBLEMS ON THIS QUESTIONNAIRE, HOW DIFFICULT HAVE THESE PROBLEMS MADE IT FOR YOU TO DO YOUR WORK, TAKE CARE OF THINGS AT HOME, OR GET ALONG WITH OTHER PEOPLE: NOT DIFFICULT AT ALL

## 2020-06-19 ASSESSMENT — PATIENT HEALTH QUESTIONNAIRE - PHQ9
5. POOR APPETITE OR OVEREATING: NOT AT ALL
SUM OF ALL RESPONSES TO PHQ QUESTIONS 1-9: 3

## 2020-06-19 NOTE — NURSING NOTE
"Chief Complaint   Patient presents with     Anxiety     Depression       Initial There were no vitals taken for this visit. Estimated body mass index is 24.48 kg/m  as calculated from the following:    Height as of 5/18/20: 1.689 m (5' 6.5\").    Weight as of 5/28/20: 69.9 kg (154 lb).  Medication Reconciliation: complete  Monse Pantoja LPN  "

## 2020-06-19 NOTE — PROGRESS NOTES
"Terry Kemp is a 59 year old male who is being evaluated via a billable telephone visit.      The patient has been notified of following:     \"This telephone visit will be conducted via a call between you and your physician/provider. We have found that certain health care needs can be provided without the need for a physical exam.  This service lets us provide the care you need with a short phone conversation.  If a prescription is necessary we can send it directly to your pharmacy.  If lab work is needed we can place an order for that and you can then stop by our lab to have the test done at a later time.    Telephone visits are billed at different rates depending on your insurance coverage. During this emergency period, for some insurers they may be billed the same as an in-person visit.  Please reach out to your insurance provider with any questions.    If during the course of the call the physician/provider feels a telephone visit is not appropriate, you will not be charged for this service.\"    Patient has given verbal consent for Telephone visit?  Yes    What phone number would you like to be contacted at?[phone]     How would you like to obtain your AVS? Mail a copy    Subjective     Terry Kemp is a 59 year old male who presents via phone visit today for the following health issues: Follow up today for anxiety. He is taking Citalopram 20 mg. He is having some AM drowsiness. He thinks this may be due to his Restoril and would like to try a smaller dose.    HPI  Depression and Anxiety Follow-Up    How are you doing with your depression since your last visit? Improved     How are you doing with your anxiety since your last visit?  Improved     Are you having other symptoms that might be associated with depression or anxiety? No    Have you had a significant life event? Relationship Concerns and Grief or Loss     Do you have any concerns with your use of alcohol or other drugs? No    Social History "     Tobacco Use     Smoking status: Never Smoker     Smokeless tobacco: Never Used   Substance Use Topics     Alcohol use: Not Currently     Alcohol/week: 0.0 standard drinks     Drug use: Never     Comment: Drug use: No     PHQ 5/22/2020 5/28/2020 6/19/2020   PHQ-9 Total Score 11 15 3   Q9: Thoughts of better off dead/self-harm past 2 weeks Not at all Not at all Not at all     KASEY-7 SCORE 5/22/2020 5/28/2020 6/19/2020   Total Score 8 17 3     Last PHQ-9 6/19/2020   1.  Little interest or pleasure in doing things 1   2.  Feeling down, depressed, or hopeless 0   3.  Trouble falling or staying asleep, or sleeping too much 0   4.  Feeling tired or having little energy 0   5.  Poor appetite or overeating 2   6.  Feeling bad about yourself 0   7.  Trouble concentrating 0   8.  Moving slowly or restless 0   Q9: Thoughts of better off dead/self-harm past 2 weeks 0   PHQ-9 Total Score 3   Difficulty at work, home, or with people Not difficult at all     KASEY-7  6/19/2020   1. Feeling nervous, anxious, or on edge 1   2. Not being able to stop or control worrying 1   3. Worrying too much about different things 1   4. Trouble relaxing 0   5. Being so restless that it is hard to sit still 0   6. Becoming easily annoyed or irritable 0   7. Feeling afraid, as if something awful might happen 0   KASEY-7 Total Score 3   If you checked any problems, how difficult have they made it for you to do your work, take care of things at home, or get along with other people? Not difficult at all         Suicide Assessment Five-step Evaluation and Treatment (SAFE-T)      How many servings of fruits and vegetables do you eat daily?  4 or more    On average, how many sweetened beverages do you drink each day (Examples: soda, juice, sweet tea, etc.  Do NOT count diet or artificially sweetened beverages)?   0    How many days per week do you exercise enough to make your heart beat faster? 5    How many minutes a day do you exercise enough to make  your heart beat faster? 30 - 60    How many days per week do you miss taking your medication? 0             Patient Active Problem List   Diagnosis     Hyperlipemia     Synovial cyst     Old peripheral tear of medial meniscus of left knee     Anxiety     Gastroesophageal reflux disease without esophagitis     Past Surgical History:   Procedure Laterality Date     ARTHROSCOPY SHOULDER      rt shoulder x two     COLONOSCOPY      2009,normal     EXCISE CYST GENERIC (LOCATION)      7/8/13,mucous cyst excision, right index finger. Dr. Bajwa     KNEE SURGERY      arthroscopy       OTHER SURGICAL HISTORY      7/8/13,79226.0,LA EXC SKIN MALIG < 0.5 CM FACE EARS EYELIDS,[facility], left eye       Social History     Tobacco Use     Smoking status: Never Smoker     Smokeless tobacco: Never Used   Substance Use Topics     Alcohol use: Not Currently     Alcohol/week: 0.0 standard drinks     Family History   Problem Relation Age of Onset     Coronary Artery Disease Mother      Lung Cancer Father         smoker     No Known Problems Sister      No Known Problems Brother      Colitis Half-Brother         colostomy bag         Current Outpatient Medications   Medication Sig Dispense Refill     citalopram (CELEXA) 20 MG tablet 1/ tab daily x 6 days, then 1 tab daily. 30 tablet 1     fluticasone (FLONASE) 50 MCG/ACT nasal spray Spray 1 spray into both nostrils daily       pantoprazole (PROTONIX) 40 MG EC tablet Take 1 tablet (40 mg) by mouth daily 30 tablet 3     sucralfate (CARAFATE) 1 GM tablet Take 1 tablet (1 g) by mouth 4 times daily 120 tablet 1     temazepam (RESTORIL) 30 MG capsule Take 1 capsule (30 mg) by mouth nightly as needed for sleep 30 capsule 1     LORazepam (ATIVAN) 0.5 MG tablet Take 1 tablet (0.5 mg) by mouth 2 times daily as needed for anxiety (Patient not taking: Reported on 6/19/2020) 40 tablet 0     No Known Allergies    Reviewed and updated as needed this visit by Provider         Review of Systems    Constitutional, HEENT, cardiovascular, pulmonary, gi and gu systems are negative, except as otherwise noted.       Objective   Reported vitals:  There were no vitals taken for this visit.   healthy, alert and no distress  PSYCH: Alert and oriented times 3; coherent speech, normal   rate and volume, able to articulate logical thoughts, able   to abstract reason, no tangential thoughts, no hallucinations   or delusions  His affect is normal  RESP: No cough, no audible wheezing, able to talk in full sentences  Remainder of exam unable to be completed due to telephone visits            Assessment/Plan:  (F41.9) Anxiety  (primary encounter diagnosis)  Comment: Increase Citalopram to 30 mg daily for 6 days, then 49 mg daily  Plan: citalopram (CELEXA) 40 MG tablet            (G47.00) Insomnia, unspecified type  Comment: Decrease dose of Restoril to try to avoid AM drowsiness.  Plan: temazepam (RESTORIL) 22.5 MG capsule            Update 6-22. I talked to patient today. He would like to try Zoloft. Lateral switch to Zoloft 50 mg daily. Decrease Restoruil to 15mg 1-2 caps HS prn. 2 week f/u.    No follow-ups on file.      Phone call duration:  15 minutes    LAST [place]

## 2020-06-20 ASSESSMENT — ANXIETY QUESTIONNAIRES: GAD7 TOTAL SCORE: 3

## 2020-06-22 RX ORDER — TEMAZEPAM 15 MG/1
CAPSULE ORAL
Qty: 30 CAPSULE | Refills: 3 | Status: SHIPPED | OUTPATIENT
Start: 2020-06-22 | End: 2020-07-07 | Stop reason: DRUGHIGH

## 2020-06-24 ENCOUNTER — TELEPHONE (OUTPATIENT)
Dept: FAMILY MEDICINE | Facility: OTHER | Age: 60
End: 2020-06-24

## 2020-06-24 NOTE — TELEPHONE ENCOUNTER
Received a PA from [facility] for Temazepam 15 mg capsules. Submitted on CMM. Waiting for a response.

## 2020-06-25 NOTE — TELEPHONE ENCOUNTER
Call. His insurance is denying PA. This med is about $20 cash pay per pharmacist, if he just wants to pay out of pocket. Otherwise he needs to call his insurance and ask what sleeping meds they cover.

## 2020-06-25 NOTE — TELEPHONE ENCOUNTER
Received a DENIAL From [facility] for Temazepam 15 mg capsules.     Forms scanned to Epic.

## 2020-07-06 DIAGNOSIS — Z01.818 PRE-OP TESTING: Primary | ICD-10-CM

## 2020-07-07 ENCOUNTER — MYC MEDICAL ADVICE (OUTPATIENT)
Dept: FAMILY MEDICINE | Facility: OTHER | Age: 60
End: 2020-07-07

## 2020-07-07 ENCOUNTER — VIRTUAL VISIT (OUTPATIENT)
Dept: FAMILY MEDICINE | Facility: OTHER | Age: 60
End: 2020-07-07
Attending: PHYSICIAN ASSISTANT
Payer: COMMERCIAL

## 2020-07-07 DIAGNOSIS — F41.9 ANXIETY: Primary | ICD-10-CM

## 2020-07-07 DIAGNOSIS — G47.00 INSOMNIA, UNSPECIFIED TYPE: ICD-10-CM

## 2020-07-07 PROCEDURE — 99213 OFFICE O/P EST LOW 20 MIN: CPT | Mod: 95 | Performed by: PHYSICIAN ASSISTANT

## 2020-07-07 RX ORDER — SERTRALINE HYDROCHLORIDE 100 MG/1
100 TABLET, FILM COATED ORAL DAILY
Qty: 30 TABLET | Refills: 3 | Status: SHIPPED | OUTPATIENT
Start: 2020-07-07 | End: 2020-08-06

## 2020-07-07 RX ORDER — TEMAZEPAM 30 MG
30 CAPSULE ORAL
Qty: 30 CAPSULE | Refills: 3 | Status: SHIPPED | OUTPATIENT
Start: 2020-07-07 | End: 2020-07-30

## 2020-07-07 ASSESSMENT — PAIN SCALES - GENERAL: PAINLEVEL: NO PAIN (0)

## 2020-07-07 ASSESSMENT — PATIENT HEALTH QUESTIONNAIRE - PHQ9: SUM OF ALL RESPONSES TO PHQ QUESTIONS 1-9: 1

## 2020-07-07 ASSESSMENT — ANXIETY QUESTIONNAIRES
3. WORRYING TOO MUCH ABOUT DIFFERENT THINGS: NOT AT ALL
6. BECOMING EASILY ANNOYED OR IRRITABLE: NOT AT ALL
4. TROUBLE RELAXING: NOT AT ALL
7. FEELING AFRAID AS IF SOMETHING AWFUL MIGHT HAPPEN: SEVERAL DAYS
2. NOT BEING ABLE TO STOP OR CONTROL WORRYING: SEVERAL DAYS
IF YOU CHECKED OFF ANY PROBLEMS ON THIS QUESTIONNAIRE, HOW DIFFICULT HAVE THESE PROBLEMS MADE IT FOR YOU TO DO YOUR WORK, TAKE CARE OF THINGS AT HOME, OR GET ALONG WITH OTHER PEOPLE: NOT DIFFICULT AT ALL
5. BEING SO RESTLESS THAT IT IS HARD TO SIT STILL: NOT AT ALL
1. FEELING NERVOUS, ANXIOUS, OR ON EDGE: NOT AT ALL
GAD7 TOTAL SCORE: 2

## 2020-07-07 NOTE — PROGRESS NOTES
"Terry Kemp is a 59 year old male who is being evaluated via a billable telephone visit.      The patient has been notified of following:     \"This telephone visit will be conducted via a call between you and your physician/provider. We have found that certain health care needs can be provided without the need for a physical exam.  This service lets us provide the care you need with a short phone conversation.  If a prescription is necessary we can send it directly to your pharmacy.  If lab work is needed we can place an order for that and you can then stop by our lab to have the test done at a later time.    Telephone visits are billed at different rates depending on your insurance coverage. During this emergency period, for some insurers they may be billed the same as an in-person visit.  Please reach out to your insurance provider with any questions.    If during the course of the call the physician/provider feels a telephone visit is not appropriate, you will not be charged for this service.\"    Patient has given verbal consent for Telephone visit?  Yes    What phone number would you like to be contacted at? [phone]    How would you like to obtain your AVS? MyChart    Subjective     Terry Kemp is a 59 year old male who presents via phone visit today for the following health issues:Follow up anxiety. Terry started Zoloft 50 mg 2 weeks ago.he has noted some benefit in symptoms without SE. He continues with mild panic episodes but improved from previous. Next he tried Restoril 15 mg, a decrease from 30mg he was taking before. It has not been effective at this dose.    HPI  Anxiety Follow-Up    How are you doing with your anxiety since your last visit? Improved but still there    Are you having other symptoms that might be associated with anxiety? Yes:  shortness of breath at times    Have you had a significant life event? No     Are you feeling depressed? No    Do you have any concerns with your use of " alcohol or other drugs? No    Social History     Tobacco Use     Smoking status: Never Smoker     Smokeless tobacco: Never Used   Substance Use Topics     Alcohol use: Not Currently     Alcohol/week: 0.0 standard drinks     Drug use: Never     Comment: Drug use: No     KASEY-7 SCORE 5/22/2020 5/28/2020 6/19/2020   Total Score 8 17 3     PHQ 5/22/2020 5/28/2020 6/19/2020   PHQ-9 Total Score 11 15 3   Q9: Thoughts of better off dead/self-harm past 2 weeks Not at all Not at all Not at all     Last PHQ-9 7/7/2020   1.  Little interest or pleasure in doing things 0   2.  Feeling down, depressed, or hopeless 0   3.  Trouble falling or staying asleep, or sleeping too much 0   4.  Feeling tired or having little energy 0   5.  Poor appetite or overeating 1   6.  Feeling bad about yourself 0   7.  Trouble concentrating 0   8.  Moving slowly or restless 0   Q9: Thoughts of better off dead/self-harm past 2 weeks 0   PHQ-9 Total Score 1   Difficulty at work, home, or with people Not difficult at all     KASEY-7  6/19/2020   1. Feeling nervous, anxious, or on edge 1   2. Not being able to stop or control worrying 1   3. Worrying too much about different things 1   4. Trouble relaxing 0   5. Being so restless that it is hard to sit still 0   6. Becoming easily annoyed or irritable 0   7. Feeling afraid, as if something awful might happen 0   KASEY-7 Total Score 3   If you checked any problems, how difficult have they made it for you to do your work, take care of things at home, or get along with other people? Not difficult at all         How many servings of fruits and vegetables do you eat daily?  4 or more    On average, how many sweetened beverages do you drink each day (Examples: soda, juice, sweet tea, etc.  Do NOT count diet or artificially sweetened beverages)?   0    How many days per week do you exercise enough to make your heart beat faster? 6    How many minutes a day do you exercise enough to make your heart beat faster? 60  or more    How many days per week do you miss taking your medication? 0             Patient Active Problem List   Diagnosis     Hyperlipemia     Synovial cyst     Old peripheral tear of medial meniscus of left knee     Anxiety     Gastroesophageal reflux disease without esophagitis     Past Surgical History:   Procedure Laterality Date     ARTHROSCOPY SHOULDER      rt shoulder x two     COLONOSCOPY      2009,normal     EXCISE CYST GENERIC (LOCATION)      7/8/13,mucous cyst excision, right index finger. Dr. Bajwa     KNEE SURGERY      arthroscopy       OTHER SURGICAL HISTORY      7/8/13,57192.0,CA EXC SKIN MALIG < 0.5 CM FACE EARS EYELIDS,[place], left eye       Social History     Tobacco Use     Smoking status: Never Smoker     Smokeless tobacco: Never Used   Substance Use Topics     Alcohol use: Not Currently     Alcohol/week: 0.0 standard drinks     Family History   Problem Relation Age of Onset     Coronary Artery Disease Mother      Lung Cancer Father         smoker     No Known Problems Sister      No Known Problems Brother      Colitis Half-Brother         colostomy bag         Current Outpatient Medications   Medication Sig Dispense Refill     pantoprazole (PROTONIX) 40 MG EC tablet Take 1 tablet (40 mg) by mouth daily 30 tablet 3     sertraline (ZOLOFT) 100 MG tablet Take 1 tablet (100 mg) by mouth daily 30 tablet 3     sucralfate (CARAFATE) 1 GM tablet Take 1 tablet (1 g) by mouth 4 times daily 120 tablet 1     temazepam (RESTORIL) 30 MG capsule Take 1 capsule (30 mg) by mouth nightly as needed for sleep 30 capsule 3     fluticasone (FLONASE) 50 MCG/ACT nasal spray Spray 1 spray into both nostrils daily       LORazepam (ATIVAN) 0.5 MG tablet Take 1 tablet (0.5 mg) by mouth 2 times daily as needed for anxiety (Patient not taking: Reported on 6/19/2020) 40 tablet 0     No Known Allergies    Reviewed and updated as needed this visit by Provider         Review of Systems   Constitutional, HEENT,  cardiovascular, pulmonary, gi and gu systems are negative, except as otherwise noted.       Objective   Reported vitals:  There were no vitals taken for this visit.   healthy, alert and no distress  PSYCH: Alert and oriented times 3; coherent speech, normal   rate and volume, able to articulate logical thoughts, able   to abstract reason, no tangential thoughts, no hallucinations   or delusions  His affect is normal  RESP: No cough, no audible wheezing, able to talk in full sentences  Remainder of exam unable to be completed due to telephone visits            Assessment/Plan:  (F41.9) Anxiety  (primary encounter diagnosis)  Comment: Increase Zoloft to 100 mg daily with 1 month f/u.  Plan: sertraline (ZOLOFT) 100 MG tablet            (G47.00) Insomnia, unspecified type  Comment: Retuen to Restoril 30 mg HS prn  Plan: temazepam (RESTORIL) 30 MG capsule                No follow-ups on file.      Phone call duration:  14 minutes    LAST [place]

## 2020-07-07 NOTE — NURSING NOTE
"Chief Complaint   Patient presents with     Anxiety       Initial There were no vitals taken for this visit. Estimated body mass index is 24.48 kg/m  as calculated from the following:    Height as of 5/18/20: 1.689 m (5' 6.5\").    Weight as of 5/28/20: 69.9 kg (154 lb).  Medication Reconciliation: complete  Desiree Flower LPN    "

## 2020-07-08 ASSESSMENT — ANXIETY QUESTIONNAIRES: GAD7 TOTAL SCORE: 2

## 2020-07-13 ENCOUNTER — MYC MEDICAL ADVICE (OUTPATIENT)
Dept: FAMILY MEDICINE | Facility: OTHER | Age: 60
End: 2020-07-13

## 2020-07-20 ENCOUNTER — ALLIED HEALTH/NURSE VISIT (OUTPATIENT)
Dept: FAMILY MEDICINE | Facility: OTHER | Age: 60
End: 2020-07-20
Attending: SURGERY
Payer: COMMERCIAL

## 2020-07-20 DIAGNOSIS — Z01.818 PRE-OP TESTING: ICD-10-CM

## 2020-07-20 PROCEDURE — C9803 HOPD COVID-19 SPEC COLLECT: HCPCS

## 2020-07-20 PROCEDURE — 99207 ZZC NO CHARGE NURSE ONLY: CPT

## 2020-07-20 PROCEDURE — U0003 INFECTIOUS AGENT DETECTION BY NUCLEIC ACID (DNA OR RNA); SEVERE ACUTE RESPIRATORY SYNDROME CORONAVIRUS 2 (SARS-COV-2) (CORONAVIRUS DISEASE [COVID-19]), AMPLIFIED PROBE TECHNIQUE, MAKING USE OF HIGH THROUGHPUT TECHNOLOGIES AS DESCRIBED BY CMS-2020-01-R: HCPCS | Mod: ZL | Performed by: SURGERY

## 2020-07-21 LAB
SARS-COV-2 RNA SPEC QL NAA+PROBE: NOT DETECTED
SPECIMEN SOURCE: NORMAL

## 2020-07-23 ENCOUNTER — ANESTHESIA (OUTPATIENT)
Dept: SURGERY | Facility: OTHER | Age: 60
End: 2020-07-23
Payer: COMMERCIAL

## 2020-07-23 ENCOUNTER — ANESTHESIA EVENT (OUTPATIENT)
Dept: SURGERY | Facility: OTHER | Age: 60
End: 2020-07-23
Payer: COMMERCIAL

## 2020-07-23 ENCOUNTER — HOSPITAL ENCOUNTER (OUTPATIENT)
Facility: OTHER | Age: 60
Discharge: HOME | End: 2020-07-23
Attending: SURGERY | Admitting: SURGERY
Payer: COMMERCIAL

## 2020-07-23 VITALS
DIASTOLIC BLOOD PRESSURE: 82 MMHG | HEIGHT: 66 IN | SYSTOLIC BLOOD PRESSURE: 135 MMHG | WEIGHT: 154 LBS | BODY MASS INDEX: 24.75 KG/M2 | HEART RATE: 72 BPM | OXYGEN SATURATION: 98 % | TEMPERATURE: 96.3 F

## 2020-07-23 DIAGNOSIS — K21.00 GASTROESOPHAGEAL REFLUX DISEASE WITH ESOPHAGITIS: Primary | ICD-10-CM

## 2020-07-23 PROCEDURE — 25800030 ZZH RX IP 258 OP 636: Performed by: SURGERY

## 2020-07-23 PROCEDURE — 25000128 H RX IP 250 OP 636: Performed by: SURGERY

## 2020-07-23 PROCEDURE — 40000010 ZZH STATISTIC ANES STAT CODE-CRNA PER MINUTE: Performed by: SURGERY

## 2020-07-23 PROCEDURE — 25800030 ZZH RX IP 258 OP 636: Performed by: NURSE ANESTHETIST, CERTIFIED REGISTERED

## 2020-07-23 PROCEDURE — 25000125 ZZHC RX 250: Performed by: SURGERY

## 2020-07-23 PROCEDURE — 25000125 ZZHC RX 250: Performed by: NURSE ANESTHETIST, CERTIFIED REGISTERED

## 2020-07-23 PROCEDURE — 43239 EGD BIOPSY SINGLE/MULTIPLE: CPT

## 2020-07-23 PROCEDURE — 88305 TISSUE EXAM BY PATHOLOGIST: CPT

## 2020-07-23 PROCEDURE — 43239 EGD BIOPSY SINGLE/MULTIPLE: CPT | Performed by: NURSE ANESTHETIST, CERTIFIED REGISTERED

## 2020-07-23 PROCEDURE — 25000128 H RX IP 250 OP 636: Performed by: NURSE ANESTHETIST, CERTIFIED REGISTERED

## 2020-07-23 PROCEDURE — 43239 EGD BIOPSY SINGLE/MULTIPLE: CPT | Performed by: SURGERY

## 2020-07-23 PROCEDURE — 91035 G-ESOPH REFLX TST W/ELECTROD: CPT | Performed by: SURGERY

## 2020-07-23 PROCEDURE — 88342 IMHCHEM/IMCYTCHM 1ST ANTB: CPT

## 2020-07-23 RX ORDER — SODIUM CHLORIDE, SODIUM LACTATE, POTASSIUM CHLORIDE, CALCIUM CHLORIDE 600; 310; 30; 20 MG/100ML; MG/100ML; MG/100ML; MG/100ML
INJECTION, SOLUTION INTRAVENOUS CONTINUOUS
Status: CANCELLED | OUTPATIENT
Start: 2020-07-23

## 2020-07-23 RX ORDER — METOCLOPRAMIDE 10 MG/1
10 TABLET ORAL EVERY 6 HOURS PRN
Status: CANCELLED | OUTPATIENT
Start: 2020-07-23

## 2020-07-23 RX ORDER — METOCLOPRAMIDE HYDROCHLORIDE 5 MG/ML
10 INJECTION INTRAMUSCULAR; INTRAVENOUS EVERY 6 HOURS PRN
Status: CANCELLED | OUTPATIENT
Start: 2020-07-23

## 2020-07-23 RX ORDER — LIDOCAINE 40 MG/G
CREAM TOPICAL
Status: DISCONTINUED | OUTPATIENT
Start: 2020-07-23 | End: 2020-07-23 | Stop reason: HOSPADM

## 2020-07-23 RX ORDER — SODIUM CHLORIDE, SODIUM LACTATE, POTASSIUM CHLORIDE, CALCIUM CHLORIDE 600; 310; 30; 20 MG/100ML; MG/100ML; MG/100ML; MG/100ML
INJECTION, SOLUTION INTRAVENOUS CONTINUOUS PRN
Status: DISCONTINUED | OUTPATIENT
Start: 2020-07-23 | End: 2020-07-23

## 2020-07-23 RX ORDER — SODIUM CHLORIDE, SODIUM LACTATE, POTASSIUM CHLORIDE, CALCIUM CHLORIDE 600; 310; 30; 20 MG/100ML; MG/100ML; MG/100ML; MG/100ML
INJECTION, SOLUTION INTRAVENOUS CONTINUOUS
Status: DISCONTINUED | OUTPATIENT
Start: 2020-07-23 | End: 2020-07-23 | Stop reason: HOSPADM

## 2020-07-23 RX ORDER — PROPOFOL 10 MG/ML
INJECTION, EMULSION INTRAVENOUS PRN
Status: DISCONTINUED | OUTPATIENT
Start: 2020-07-23 | End: 2020-07-23

## 2020-07-23 RX ORDER — ONDANSETRON 2 MG/ML
4 INJECTION INTRAMUSCULAR; INTRAVENOUS
Status: COMPLETED | OUTPATIENT
Start: 2020-07-23 | End: 2020-07-23

## 2020-07-23 RX ORDER — LIDOCAINE HYDROCHLORIDE 20 MG/ML
INJECTION, SOLUTION INFILTRATION; PERINEURAL PRN
Status: DISCONTINUED | OUTPATIENT
Start: 2020-07-23 | End: 2020-07-23

## 2020-07-23 RX ORDER — ONDANSETRON 4 MG/1
4 TABLET, ORALLY DISINTEGRATING ORAL EVERY 6 HOURS PRN
Status: CANCELLED | OUTPATIENT
Start: 2020-07-23

## 2020-07-23 RX ORDER — FLUMAZENIL 0.1 MG/ML
0.2 INJECTION, SOLUTION INTRAVENOUS
Status: CANCELLED | OUTPATIENT
Start: 2020-07-23 | End: 2020-07-23

## 2020-07-23 RX ORDER — NALOXONE HYDROCHLORIDE 0.4 MG/ML
.1-.4 INJECTION, SOLUTION INTRAMUSCULAR; INTRAVENOUS; SUBCUTANEOUS
Status: CANCELLED | OUTPATIENT
Start: 2020-07-23 | End: 2020-07-24

## 2020-07-23 RX ORDER — ONDANSETRON 2 MG/ML
4 INJECTION INTRAMUSCULAR; INTRAVENOUS EVERY 6 HOURS PRN
Status: CANCELLED | OUTPATIENT
Start: 2020-07-23

## 2020-07-23 RX ORDER — PROCHLORPERAZINE MALEATE 5 MG
10 TABLET ORAL EVERY 6 HOURS PRN
Status: CANCELLED | OUTPATIENT
Start: 2020-07-23

## 2020-07-23 RX ADMIN — PROPOFOL 50 MG: 10 INJECTION, EMULSION INTRAVENOUS at 10:43

## 2020-07-23 RX ADMIN — PROPOFOL 20 MG: 10 INJECTION, EMULSION INTRAVENOUS at 10:51

## 2020-07-23 RX ADMIN — SODIUM CHLORIDE, POTASSIUM CHLORIDE, SODIUM LACTATE AND CALCIUM CHLORIDE: 600; 310; 30; 20 INJECTION, SOLUTION INTRAVENOUS at 10:39

## 2020-07-23 RX ADMIN — LIDOCAINE HYDROCHLORIDE 50 MG: 20 INJECTION, SOLUTION INFILTRATION; PERINEURAL at 10:43

## 2020-07-23 RX ADMIN — LIDOCAINE HYDROCHLORIDE 50 MG: 20 INJECTION, SOLUTION INFILTRATION; PERINEURAL at 10:42

## 2020-07-23 RX ADMIN — PROPOFOL 30 MG: 10 INJECTION, EMULSION INTRAVENOUS at 10:47

## 2020-07-23 RX ADMIN — ONDANSETRON 4 MG: 2 INJECTION INTRAMUSCULAR; INTRAVENOUS at 10:52

## 2020-07-23 RX ADMIN — PROPOFOL 40 MG: 10 INJECTION, EMULSION INTRAVENOUS at 10:54

## 2020-07-23 RX ADMIN — SODIUM CHLORIDE, POTASSIUM CHLORIDE, SODIUM LACTATE AND CALCIUM CHLORIDE 30 ML/HR: 600; 310; 30; 20 INJECTION, SOLUTION INTRAVENOUS at 09:29

## 2020-07-23 ASSESSMENT — MIFFLIN-ST. JEOR: SCORE: 1446.29

## 2020-07-23 NOTE — OR NURSING
Patient has been discharged to home at 1215 via ambulatory accompanied by his JAVIER Leonard.    Written discharge instructions were provided to patient.  Prescriptions were none. He denies any pain, nausea, or dizziness upon discharge. He was instructed on use of his Bravo pH monitor, diary, and when and how to return it.      Patient and adult caring for them verbalize understanding of discharge instructions including no driving until tomorrow and no longer taking narcotic pain medications - no operating mechanical equipment and no making any important decisions.They understand reason for discharge, and necessary follow-up appointments.      Oly Arzate RN

## 2020-07-23 NOTE — DISCHARGE INSTRUCTIONS
You were given instruction today on the BRAVO recording device and how to enter your Symptoms via the recorder. You were also given a 2 page instruction packet that includes a written diary. You will need to bring back the written diary and the BRAVO recording device to Day Surgery or ER registration after 48 hours, which would be after 11:00 am Saturday July 25th.      [facility]  Adult Discharge Orders & Instructions    ________________________________________________________________          For 12 hours after surgery  1. Get plenty of rest.  A responsible adult must stay with you for at least 12 hours after you leave the hospital.   2. You may feel lightheaded.  IF so, sit for a few minutes before standing.  Have someone help you get up.   3. You may have a slight fever. Call the doctor if your fever is over 101 F (38.3 C) (taken under the tongue) or lasts longer than 24 hours.  4. You may have a dry mouth, a sore throat, muscle aches or trouble sleeping.  These should go away after 24 hours.  5. Do not make important or legal decisions.  6.   Do not drive or use heavy equipment.  If you have weakness or tingling, don't drive or use heavy equipment until this feeling goes away.    To contact a doctor, call   [phone]_______________________

## 2020-07-23 NOTE — OP NOTE
PROCEDURE NOTE    SURGEON:Steven Anderson MD    PRE-OP DIAGNOSIS:   GERD, epigastric pain      POST-OP DIAGNOSIS: Gastritis, duodenitis.     PROCEDURE PLANNED:   Diagnostic EGD, flexible        PROCEDURE PERFORMED:  EGD with biopsy, flexible    SPECIMEN:        ANESTHESIA: Monitor Anesthesia Care  Coverage requested  CRNA Independent: Clari Allison APRN CRNA      ESTIMATED BLOOD LOSS: None    COMPLICATIONS:  None    INDICATION FOR THE PROCEDURE:The patient is a 59 year oldmale. The patient presents with epigastric pain and weight loss since visiting [place]. I explained to the patient the risks, benefits and alternatives to diagnostic EGD  with bravo pH capsule placement for evaluating GERD. We specifically discussed the risks of bleeding, infection, perforation and the risks of sedation. The patient's questions were answered and the patient wished to proceed. Informed consent paperwork was completed.    PROCEDURE: The patient was taken to the endoscopy suite. Appropriate monitors were attached. The patient was placed in the left lateral decubitus position. Bite block was positioned. Timeout was performed confirming the patient's identity and procedure to be performed. After appropriate sedation was confirmed, the flexible endoscope was advanced into the oropharynx. The posterior oropharynx appeared grossly normal. The scope was advanced into the proximal esophagus. The esophagus was insufflated with air. The scope was advanced under direct visualization. No acute abnormalities of the esophagus were noted. The scope was advanced into the stomach. The scope was advanced through the pylorus into the duodenal bulb. The bulb had mild duodenitis and distal duodenum appeared grossly normal. The scope was withdrawn back into the stomach. Antral biopsy was obtained and sent to pathology. There was linear gastritis. The scope was retroflexed and the GE junction inspected. No abnormalities were noted. The scope was  returned to a neutral position and the stomach was decompressed. The scope was withdrawn to the GE junction and biopsy obtained.  The hiatus measured 39.  The Z -line measured 38-39. There was LA Grade A change with mild esophagitis. The mucosa of the esophagus was inspected while withdrawing the scope. No abnormalities were noted. . The scope was withdrawn from the patient.  The bravo catheter was advanced to 33 cm. Suction was held for at least 30 seconds without interruption. The probe was deployed. The scope was re-advanced and the capsule verified.  The bite block was removed.The bite block was removed. The patient tolerated the procedure with no immediately apparent complication. The patient was taken to recovery instable condition.    FOLLOW UP:  RECOMMENDATIONS  Follow-up pathology and bravo results.

## 2020-07-23 NOTE — ANESTHESIA PREPROCEDURE EVALUATION
Anesthesia Pre-Procedure Evaluation    Patient: Terry Kemp   MRN: [id number] : 1960          Preoperative Diagnosis: GERD (gastroesophageal reflux disease) [K21.9]    Procedure(s):  ESOPHAGOGASTRODUODENOSCOPY, WITH BRAVO PH MONITORING DEVICE INSERTION    Past Medical History:   Diagnosis Date     Hyperlipidemia     2013     Male erectile dysfunction     2013     Past Surgical History:   Procedure Laterality Date     ARTHROSCOPY SHOULDER      rt shoulder x two     COLONOSCOPY      ,normal     EXCISE CYST GENERIC (LOCATION)      13,mucous cyst excision, right index finger. Dr. Bajwa     KNEE SURGERY      arthroscopy       OTHER SURGICAL HISTORY      13,98238.0,MN EXC SKIN MALIG < 0.5 CM FACE EARS EYELIDS,[place], left eye       Anesthesia Evaluation     . Pt has had prior anesthetic.     No history of anesthetic complications          ROS/MED HX    ENT/Pulmonary:  - neg pulmonary ROS     Neurologic:  - neg neurologic ROS     Cardiovascular:  - neg cardiovascular ROS   (+) ----. : . . . :. . Previous cardiac testing date:results:date: results:ECG reviewed date:10/14/19 results: date: results:          METS/Exercise Tolerance:  >4 METS   Hematologic:  - neg hematologic  ROS       Musculoskeletal:  - neg musculoskeletal ROS       GI/Hepatic:     (+) GERD Asymptomatic on medication,       Renal/Genitourinary:  - ROS Renal section negative       Endo:  - neg endo ROS       Psychiatric:     (+) psychiatric history anxiety      Infectious Disease:  - neg infectious disease ROS       Malignancy:      - no malignancy   Other:    - neg other ROS                      Physical Exam  Normal systems: cardiovascular, pulmonary and dental    Airway   Mallampati: I  TM distance: >3 FB  Neck ROM: full    Dental     Cardiovascular   Rhythm and rate: regular and normal      Pulmonary    breath sounds clear to auscultation            Lab Results   Component Value Date    WBC 6.9 2020    HGB 14.7  "04/03/2020    HCT 44.8 04/03/2020     04/03/2020     04/03/2020    POTASSIUM 4.1 04/03/2020    CHLORIDE 102 04/03/2020    CO2 29 04/03/2020    BUN 12 04/03/2020    CR 1.03 05/22/2020    GLC 98 04/03/2020    ARIC 10.3 04/03/2020    ALBUMIN 4.6 04/03/2020    PROTTOTAL 7.5 04/03/2020    ALT 20 04/03/2020    AST 17 04/03/2020    ALKPHOS 36 04/03/2020    BILITOTAL 0.8 04/03/2020       Preop Vitals  BP Readings from Last 3 Encounters:   07/23/20 (!) 140/91   05/18/20 118/72   04/03/20 138/80    Pulse Readings from Last 3 Encounters:   05/18/20 94   04/03/20 95   02/05/20 88      Resp Readings from Last 3 Encounters:   04/03/20 16   02/05/20 18   10/14/19 16    SpO2 Readings from Last 3 Encounters:   07/23/20 99%   05/18/20 97%   04/03/20 99%      Temp Readings from Last 1 Encounters:   07/23/20 96.2  F (35.7  C) (Tympanic)    Ht Readings from Last 1 Encounters:   07/23/20 1.676 m (5' 6\")      Wt Readings from Last 1 Encounters:   07/23/20 69.9 kg (154 lb)    Estimated body mass index is 24.86 kg/m  as calculated from the following:    Height as of this encounter: 1.676 m (5' 6\").    Weight as of this encounter: 69.9 kg (154 lb).       Anesthesia Plan      History & Physical Review      ASA Status:  2 .    NPO Status:  > 6 hours    Plan for MAC with Intravenous induction.            Postoperative Care      Consents  Anesthetic plan, risks, benefits and alternatives discussed with:  Patient..                 BOB PORTILLO CRNA  "

## 2020-07-23 NOTE — H&P
CHIEF COMPLAINT / REASON FOR PROCEDURE:  Epigastric pain, GERD    PERTINENT HISTORY   Patient complains of epigastric pain that is relieved with food.. Previous EGD - None.     Past Medical History:   Diagnosis Date     Hyperlipidemia     6/11/2013     Male erectile dysfunction     6/11/2013       Past Surgical History:   Procedure Laterality Date     ARTHROSCOPY SHOULDER      rt shoulder x two     COLONOSCOPY      2009,normal     EXCISE CYST GENERIC (LOCATION)      7/8/13,mucous cyst excision, right index finger. Dr. Bajwa     KNEE SURGERY      arthroscopy       OTHER SURGICAL HISTORY      7/8/13,10433.0,[place] EXC SKIN MALIG < 0.5 CM FACE EARS EYELIDS,Serleth, left eye       Other:  None  Bleeding tendencies:  None      Relevant Family History: None     Relevant Social History:  None    10 point ROS of systems including Constitutional, Eyes, Respiratory, Cardiovascular, Gastroenterology, Genitourinary, Integumentary, Muscularskeletal, Psychiatric were all negative except for pertinent positives noted in my HPI.      ALLERGIES/SENSITIVITIES: No Known Allergies     CURRENT MEDICATIONS:  No current facility-administered medications on file prior to encounter.   pantoprazole (PROTONIX) 40 MG EC tablet, Take 1 tablet (40 mg) by mouth daily  sucralfate (CARAFATE) 1 GM tablet, Take 1 tablet (1 g) by mouth 4 times daily          PRE-SEDATION ASSESSMENT:    LUNGS:  CTA B/L, no wheezing or crackles.  Heart & CV:  RRR no murmur.  Intact distal pulses, good cap refill.    Comment(s):      IMPRESSION:  59 year old male in need of EGD to evaluate for GERD and PUD.    PLAN:  I discussed diagnostic EGD with the patient. Anesthesia requested    Steven Anderson MD

## 2020-07-23 NOTE — ANESTHESIA POSTPROCEDURE EVALUATION
Patient: Terry Kemp    Procedure(s):  ESOPHAGOGASTRODUODENOSCOPY, WITH BRAVO PH MONITORING DEVICE INSERTION, and biopsy    Diagnosis:GERD (gastroesophageal reflux disease) [K21.9]  Diagnosis Additional Information: No value filed.    Anesthesia Type:  MAC    Note:  Anesthesia Post Evaluation    Patient location during evaluation: Phase 2  Patient participation: Able to fully participate in evaluation  Level of consciousness: awake and alert  Pain management: adequate  Airway patency: patent  Cardiovascular status: acceptable  Respiratory status: acceptable  Hydration status: acceptable  PONV: none             Last vitals:  Vitals:    07/23/20 1104 07/23/20 1115 07/23/20 1130   BP: 94/55 93/63 117/75   Pulse: 60 63 73   Temp: 96.3  F (35.7  C)     SpO2: 98% 96% 97%         Electronically Signed By: BOB Brandt CRNA  July 23, 2020  11:58 AM

## 2020-07-23 NOTE — ANESTHESIA CARE TRANSFER NOTE
Patient: Terry Kemp    Procedure(s):  ESOPHAGOGASTRODUODENOSCOPY, WITH BRAVO PH MONITORING DEVICE INSERTION, and biopsy    Diagnosis: GERD (gastroesophageal reflux disease) [K21.9]  Diagnosis Additional Information: No value filed.    Anesthesia Type:   MAC     Note:  Airway :Room Air  Patient transferred to:Phase II  Handoff Report: Identifed the Patient, Identified the Reponsible Provider, Reviewed the pertinent medical history, Discussed the surgical course, Reviewed Intra-OP anesthesia mangement and issues during anesthesia, Set expectations for post-procedure period and Allowed opportunity for questions and acknowledgement of understanding      Vitals: (Last set prior to Anesthesia Care Transfer)    CRNA VITALS  7/23/2020 1033 - 7/23/2020 1107      7/23/2020             NIBP:  (!) 83/51    Pulse:  61    NIBP Mean:  62    Ht Rate:  61    SpO2:  98 %    Resp Rate (set):  10                Electronically Signed By: BOB Brandt CRNA  July 23, 2020  11:07 AM

## 2020-07-23 NOTE — OR NURSING
"Bravo equipment was prepared for the procedure. The Bravo recorder settings were verified and the Bravo capsule was calibrated. The Bravo capsule was placed, by the physician, at 6cm above the identified \"SCJ\" following endoscopy. Recorder mode was activated after the confirmed attachment.     "

## 2020-07-28 ENCOUNTER — MYC MEDICAL ADVICE (OUTPATIENT)
Dept: FAMILY MEDICINE | Facility: OTHER | Age: 60
End: 2020-07-28

## 2020-07-28 ENCOUNTER — TELEPHONE (OUTPATIENT)
Dept: SURGERY | Facility: OTHER | Age: 60
End: 2020-07-28

## 2020-07-29 PROCEDURE — 91035 G-ESOPH REFLX TST W/ELECTROD: CPT | Mod: 26 | Performed by: SURGERY

## 2020-07-30 DIAGNOSIS — G47.00 INSOMNIA, UNSPECIFIED TYPE: ICD-10-CM

## 2020-07-30 RX ORDER — TEMAZEPAM 30 MG
30 CAPSULE ORAL
Qty: 30 CAPSULE | Refills: 3 | Status: SHIPPED | OUTPATIENT
Start: 2020-07-30 | End: 2020-08-06

## 2020-08-06 ENCOUNTER — VIRTUAL VISIT (OUTPATIENT)
Dept: FAMILY MEDICINE | Facility: OTHER | Age: 60
End: 2020-08-06
Attending: PHYSICIAN ASSISTANT
Payer: COMMERCIAL

## 2020-08-06 DIAGNOSIS — G47.00 INSOMNIA, UNSPECIFIED TYPE: ICD-10-CM

## 2020-08-06 DIAGNOSIS — F41.9 ANXIETY: Primary | ICD-10-CM

## 2020-08-06 PROCEDURE — 99213 OFFICE O/P EST LOW 20 MIN: CPT | Mod: 95 | Performed by: PHYSICIAN ASSISTANT

## 2020-08-06 RX ORDER — TEMAZEPAM 30 MG
30 CAPSULE ORAL
Qty: 90 CAPSULE | Refills: 1 | Status: SHIPPED | OUTPATIENT
Start: 2020-08-06 | End: 2021-02-05

## 2020-08-06 RX ORDER — SERTRALINE HYDROCHLORIDE 100 MG/1
100 TABLET, FILM COATED ORAL DAILY
Qty: 90 TABLET | Refills: 1 | Status: SHIPPED | OUTPATIENT
Start: 2020-08-06 | End: 2021-02-19

## 2020-08-06 ASSESSMENT — ANXIETY QUESTIONNAIRES
3. WORRYING TOO MUCH ABOUT DIFFERENT THINGS: NOT AT ALL
7. FEELING AFRAID AS IF SOMETHING AWFUL MIGHT HAPPEN: NOT AT ALL
IF YOU CHECKED OFF ANY PROBLEMS ON THIS QUESTIONNAIRE, HOW DIFFICULT HAVE THESE PROBLEMS MADE IT FOR YOU TO DO YOUR WORK, TAKE CARE OF THINGS AT HOME, OR GET ALONG WITH OTHER PEOPLE: NOT DIFFICULT AT ALL
5. BEING SO RESTLESS THAT IT IS HARD TO SIT STILL: NOT AT ALL
GAD7 TOTAL SCORE: 0
4. TROUBLE RELAXING: NOT AT ALL
2. NOT BEING ABLE TO STOP OR CONTROL WORRYING: NOT AT ALL
6. BECOMING EASILY ANNOYED OR IRRITABLE: NOT AT ALL
1. FEELING NERVOUS, ANXIOUS, OR ON EDGE: NOT AT ALL

## 2020-08-06 ASSESSMENT — PAIN SCALES - GENERAL: PAINLEVEL: NO PAIN (0)

## 2020-08-06 ASSESSMENT — PATIENT HEALTH QUESTIONNAIRE - PHQ9: SUM OF ALL RESPONSES TO PHQ QUESTIONS 1-9: 1

## 2020-08-06 NOTE — PROGRESS NOTES
"Terry Kemp is a 59 year old male who is being evaluated via a billable telephone visit.      The patient has been notified of following:     \"This telephone visit will be conducted via a call between you and your physician/provider. We have found that certain health care needs can be provided without the need for a physical exam.  This service lets us provide the care you need with a short phone conversation.  If a prescription is necessary we can send it directly to your pharmacy.  If lab work is needed we can place an order for that and you can then stop by our lab to have the test done at a later time.    Telephone visits are billed at different rates depending on your insurance coverage. During this emergency period, for some insurers they may be billed the same as an in-person visit.  Please reach out to your insurance provider with any questions.    If during the course of the call the physician/provider feels a telephone visit is not appropriate, you will not be charged for this service.\"    Patient has given verbal consent for Telephone visit?  Yes    What phone number would you like to be contacted at? [phone]    How would you like to obtain your AVS? Valer[place]art    Subjective     Terry Kemp is a 59 year old male who presents via phone visit today for the following health issues: F/u anxiety. Patient is taking Zoloft 100 mg for 6 weeks now. He notes good improvement. No SE. Just a few episodes of breakthrough anxiety. He also needs RF of Restoril. Good sleep with this med. No SE    HPI    Anxiety Follow-Up    How are you doing with your anxiety since your last visit? Improved with zoloft    Are you having other symptoms that might be associated with anxiety? No    Have you had a significant life event? No     Are you feeling depressed? No    Do you have any concerns with your use of alcohol or other drugs? No    Social History     Tobacco Use     Smoking status: Never Smoker     Smokeless tobacco: " Never Used   Substance Use Topics     Alcohol use: Not Currently     Alcohol/week: 0.0 standard drinks     Drug use: Never     Comment: Drug use: No     KASEY-7 SCORE 5/28/2020 6/19/2020 7/7/2020   Total Score 17 3 2     PHQ 5/28/2020 6/19/2020 7/7/2020   PHQ-9 Total Score 15 3 1   Q9: Thoughts of better off dead/self-harm past 2 weeks Not at all Not at all Not at all             Patient Active Problem List   Diagnosis     Hyperlipemia     Synovial cyst     Old peripheral tear of medial meniscus of left knee     Anxiety     Gastroesophageal reflux disease without esophagitis     Past Surgical History:   Procedure Laterality Date     ARTHROSCOPY SHOULDER      rt shoulder x two     COLONOSCOPY      2009,normal     EXCISE CYST GENERIC (LOCATION)      7/8/13,mucous cyst excision, right index finger. Dr. Bajwa     KNEE SURGERY      arthroscopy       OTHER SURGICAL HISTORY      7/8/13,39634.0,[place] EXC SKIN MALIG < 0.5 CM FACE EARS EYELIDS,Serleth, left eye       Social History     Tobacco Use     Smoking status: Never Smoker     Smokeless tobacco: Never Used   Substance Use Topics     Alcohol use: Not Currently     Alcohol/week: 0.0 standard drinks     Family History   Problem Relation Age of Onset     Coronary Artery Disease Mother      Lung Cancer Father         smoker     No Known Problems Sister      No Known Problems Brother      Colitis Half-Brother         colostomy bag         Current Outpatient Medications   Medication Sig Dispense Refill     sertraline (ZOLOFT) 100 MG tablet Take 1 tablet (100 mg) by mouth daily 90 tablet 1     temazepam (RESTORIL) 30 MG capsule Take 1 capsule (30 mg) by mouth nightly as needed for sleep 90 capsule 1     No Known Allergies    Reviewed and updated as needed this visit by Provider         Review of Systems   Constitutional, HEENT, cardiovascular, pulmonary, gi and gu systems are negative, except as otherwise noted.       Objective   Reported vitals:  There were no vitals taken for  this visit.   healthy, alert and no distress  PSYCH: Alert and oriented times 3; coherent speech, normal   rate and volume, able to articulate logical thoughts, able   to abstract reason, no tangential thoughts, no hallucinations   or delusions  His affect is normal  RESP: No cough, no audible wheezing, able to talk in full sentences  Remainder of exam unable to be completed due to telephone visits            Assessment/Plan:    (F41.9) Anxiety  (primary encounter diagnosis)  Comment: Stable. Continue Zolft 100 mg daily with 6 month f/u. Sooner prn  Plan: sertraline (ZOLOFT) 100 MG tablet            (G47.00) Insomnia, unspecified type  Comment: RF  Plan: temazepam (RESTORIL) 30 MG capsule                No follow-ups on file.      Phone call duration:  13 minutes    LAST [place]

## 2020-08-06 NOTE — NURSING NOTE
"Chief Complaint   Patient presents with     Anxiety       Initial There were no vitals taken for this visit. Estimated body mass index is 24.86 kg/m  as calculated from the following:    Height as of 7/23/20: 1.676 m (5' 6\").    Weight as of 7/23/20: 69.9 kg (154 lb).  Medication Reconciliation: complete  Pippa Stratton MA  "

## 2020-08-07 ASSESSMENT — ANXIETY QUESTIONNAIRES: GAD7 TOTAL SCORE: 0

## 2020-08-12 NOTE — TELEPHONE ENCOUNTER
He had 3 refills, but I sent it anyways    Lorie Rodriguez, NP    
Patient called asking if the pantoprazole 40 mg should be stopped or refilled. Patient also said is have earaches in both ears now and it is a burning pain. Is there anything that patient can get for that?   Please call patient at [phone].   
The patient was notified of Zaida's recommendations.   
Other Specify

## 2020-08-15 DIAGNOSIS — K21.9 GASTROESOPHAGEAL REFLUX DISEASE, ESOPHAGITIS PRESENCE NOT SPECIFIED: ICD-10-CM

## 2020-08-18 NOTE — TELEPHONE ENCOUNTER
protonix  Last Written Prescription Date:   Last Fill Quantity:  # of Refills:   Last Office Visit: 5/18/2020    Not current on med list

## 2020-08-19 RX ORDER — PANTOPRAZOLE SODIUM 40 MG/1
TABLET, DELAYED RELEASE ORAL
Qty: 30 TABLET | Refills: 3 | Status: SHIPPED | OUTPATIENT
Start: 2020-08-19 | End: 2021-04-19

## 2020-08-23 DIAGNOSIS — F41.9 ANXIETY: ICD-10-CM

## 2020-11-19 ENCOUNTER — OFFICE VISIT (OUTPATIENT)
Dept: AUDIOLOGY | Facility: OTHER | Age: 60
End: 2020-11-19
Attending: NURSE PRACTITIONER
Payer: COMMERCIAL

## 2020-11-19 DIAGNOSIS — H90.3 SENSORINEURAL HEARING LOSS, ASYMMETRICAL: Primary | ICD-10-CM

## 2020-11-19 DIAGNOSIS — H90.3 ASNHL (ASYMMETRICAL SENSORINEURAL HEARING LOSS): ICD-10-CM

## 2020-11-19 DIAGNOSIS — H91.93 DECREASED HEARING OF BOTH EARS: ICD-10-CM

## 2020-11-19 PROCEDURE — 92552 PURE TONE AUDIOMETRY AIR: CPT | Performed by: AUDIOLOGIST

## 2020-11-19 PROCEDURE — 92556 SPEECH AUDIOMETRY COMPLETE: CPT | Performed by: AUDIOLOGIST

## 2020-11-19 NOTE — PROGRESS NOTES
Audiology Evaluation Completed. Please refer SCANNED AUDIOGRAM and/or TYMPANOGRAM for BACKGROUND, RESULTS, RECOMMENDATIONS.    He reports his tinnitus is not bothersome.      Kori ISIDRO, [facility]  Audiologist #[id number]

## 2020-12-27 ENCOUNTER — HEALTH MAINTENANCE LETTER (OUTPATIENT)
Age: 60
End: 2020-12-27

## 2020-12-28 ENCOUNTER — MYC MEDICAL ADVICE (OUTPATIENT)
Dept: FAMILY MEDICINE | Facility: OTHER | Age: 60
End: 2020-12-28

## 2021-02-05 DIAGNOSIS — G47.00 INSOMNIA, UNSPECIFIED TYPE: ICD-10-CM

## 2021-02-05 RX ORDER — TEMAZEPAM 30 MG
30 CAPSULE ORAL
Qty: 90 CAPSULE | Refills: 0 | Status: SHIPPED | OUTPATIENT
Start: 2021-02-05 | End: 2021-04-27

## 2021-02-19 DIAGNOSIS — F41.9 ANXIETY: ICD-10-CM

## 2021-02-19 RX ORDER — SERTRALINE HYDROCHLORIDE 100 MG/1
TABLET, FILM COATED ORAL
Qty: 90 TABLET | Refills: 1 | Status: SHIPPED | OUTPATIENT
Start: 2021-02-19 | End: 2021-08-23

## 2021-02-19 NOTE — TELEPHONE ENCOUNTER
Zoloft 100 mg      Last Written Prescription Date:  8/6/20  Last Fill Quantity: 90,   # refills: 1  Last Office Visit: 8/6/20  Future Office visit:       Routing refill request to provider for review/approval because:

## 2021-04-12 ENCOUNTER — MYC MEDICAL ADVICE (OUTPATIENT)
Dept: FAMILY MEDICINE | Facility: OTHER | Age: 61
End: 2021-04-12

## 2021-04-12 DIAGNOSIS — M25.562 LEFT KNEE PAIN, UNSPECIFIED CHRONICITY: Primary | ICD-10-CM

## 2021-04-19 ENCOUNTER — MYC MEDICAL ADVICE (OUTPATIENT)
Dept: FAMILY MEDICINE | Facility: OTHER | Age: 61
End: 2021-04-19

## 2021-04-19 ENCOUNTER — HOSPITAL ENCOUNTER (OUTPATIENT)
Dept: GENERAL RADIOLOGY | Facility: OTHER | Age: 61
End: 2021-04-19
Attending: FAMILY MEDICINE
Payer: COMMERCIAL

## 2021-04-19 ENCOUNTER — OFFICE VISIT (OUTPATIENT)
Dept: FAMILY MEDICINE | Facility: OTHER | Age: 61
End: 2021-04-19
Attending: FAMILY MEDICINE
Payer: COMMERCIAL

## 2021-04-19 VITALS
TEMPERATURE: 97 F | DIASTOLIC BLOOD PRESSURE: 78 MMHG | BODY MASS INDEX: 27.06 KG/M2 | HEART RATE: 75 BPM | WEIGHT: 168.4 LBS | OXYGEN SATURATION: 96 % | SYSTOLIC BLOOD PRESSURE: 130 MMHG | HEIGHT: 66 IN | RESPIRATION RATE: 16 BRPM

## 2021-04-19 DIAGNOSIS — M17.12 PRIMARY OSTEOARTHRITIS OF LEFT KNEE: ICD-10-CM

## 2021-04-19 PROCEDURE — 99214 OFFICE O/P EST MOD 30 MIN: CPT | Mod: 25 | Performed by: FAMILY MEDICINE

## 2021-04-19 PROCEDURE — 20610 DRAIN/INJ JOINT/BURSA W/O US: CPT | Performed by: FAMILY MEDICINE

## 2021-04-19 PROCEDURE — 250N000009 HC RX 250: Performed by: FAMILY MEDICINE

## 2021-04-19 PROCEDURE — 73560 X-RAY EXAM OF KNEE 1 OR 2: CPT | Mod: RT

## 2021-04-19 PROCEDURE — 250N000011 HC RX IP 250 OP 636: Performed by: FAMILY MEDICINE

## 2021-04-19 RX ORDER — TRIAMCINOLONE ACETONIDE 40 MG/ML
40 INJECTION, SUSPENSION INTRA-ARTICULAR; INTRAMUSCULAR ONCE
Status: COMPLETED | OUTPATIENT
Start: 2021-04-19 | End: 2021-04-19

## 2021-04-19 RX ORDER — BUPIVACAINE HYDROCHLORIDE 5 MG/ML
2 INJECTION, SOLUTION EPIDURAL; INTRACAUDAL ONCE
Status: COMPLETED | OUTPATIENT
Start: 2021-04-19 | End: 2021-04-19

## 2021-04-19 RX ORDER — LIDOCAINE HYDROCHLORIDE 10 MG/ML
2 INJECTION, SOLUTION EPIDURAL; INFILTRATION; INTRACAUDAL; PERINEURAL ONCE
Status: COMPLETED | OUTPATIENT
Start: 2021-04-19 | End: 2021-04-19

## 2021-04-19 RX ADMIN — LIDOCAINE HYDROCHLORIDE 2 ML: 10 INJECTION, SOLUTION INFILTRATION; PERINEURAL at 08:20

## 2021-04-19 RX ADMIN — TRIAMCINOLONE ACETONIDE 40 MG: 40 INJECTION, SUSPENSION INTRA-ARTICULAR; INTRAMUSCULAR at 08:20

## 2021-04-19 RX ADMIN — BUPIVACAINE HYDROCHLORIDE 10 MG: 5 INJECTION, SOLUTION EPIDURAL; INTRACAUDAL at 08:20

## 2021-04-19 ASSESSMENT — MIFFLIN-ST. JEOR: SCORE: 1511.99

## 2021-04-19 ASSESSMENT — PAIN SCALES - GENERAL: PAINLEVEL: MILD PAIN (3)

## 2021-04-19 NOTE — NURSING NOTE
"Chief Complaint   Patient presents with     Knee Pain     left knee pain, ongoing for 1 year, had left knee arthoscopy in October 2019 with Dr. Stevens     Patient presents to clinic today for left knee pain. Ongoing for 1 year. He has had a history of left knee surgery in 2013 and in 2019. His last left knee surgery was with Dr. Stevens. Pain 3/10.      Initial /78 (BP Location: Right arm, Patient Position: Sitting, Cuff Size: Adult Regular)   Pulse 75   Temp 97  F (36.1  C) (Tympanic)   Resp 16   Ht 1.685 m (5' 6.34\")   Wt 76.4 kg (168 lb 6.4 oz)   SpO2 96%   BMI 26.90 kg/m   Estimated body mass index is 26.9 kg/m  as calculated from the following:    Height as of this encounter: 1.685 m (5' 6.34\").    Weight as of this encounter: 76.4 kg (168 lb 6.4 oz).         Medication Reconciliation: Complete      Luiza Nicolas LPN   "

## 2021-04-19 NOTE — PROGRESS NOTES
"HPI:  60-year-old male coming in for evaluation of left knee pain.  Patient has a history of left knee pain in 2019 that resulted in a meniscectomy of the medial meniscus under arthroscopy that fall.  Patient reports that his pain has been present for approximately 1 year.  His current pain is 3/10.  Pain is reported as aching and throbbing.  He has difficulty with longer walking, specifically towards the end of a round of golf.  Pain also with running, jumping, squatting, and going downstairs.  He will have occasional associated limping.  He feels overall that he is pretty active.  He has tried physical therapy and VMO exercises in the past.      EXAM:  /78 (BP Location: Right arm, Patient Position: Sitting, Cuff Size: Adult Regular)   Pulse 75   Temp 97  F (36.1  C) (Tympanic)   Resp 16   Ht 1.685 m (5' 6.34\")   Wt 76.4 kg (168 lb 6.4 oz)   SpO2 96%   BMI 26.90 kg/m    MUSCULOSKELETAL EXAM:  LEFT KNEE  Inspection:  -No gross deformity  -No bruising or soft tissue swelling  -Scars:  None    Tenderness to palpation of the:  -Quadriceps musculature:  Negative  -Quadriceps tendon:  Negative  -Patella:  Negative  -Medial patellar facet:  Negative  -Lateral patellar facet:  Negative  -Inferior pole of the patella:  Negative  -Patellar tendon:  Negative  -Tibial tuberosity:  Negative  -Medial joint line: Mild pain  -Medial collateral ligament:  Negative  -Medial hamstring tendons:  Negative  -Medial femoral condyle:  Negative  -Medial tibial plateau:  Negative  -Pes anserine bursa:  Negative  -Lateral joint line:  Negative  -Distal IT band:  Negative  -Gerdy's tubercle:  Negative  -Lateral collateral ligament:  Negative  -Lateral hamstring tendons:  Negative  -Lateral femoral condyle:  Negative  -Lateral tibial plateau:  Negative  -Posterior lateral corner:  Negative  -Popliteal fossa:  Negative    Range of Motion:  -Passive flexion:  135  -Passive extension:  0    Strength:  -Extension:  5/5  -Flexion:  "     Special Tests:  -Effusion:  Absent  -Medial patellar glide:  Negative  -Lateral patellar glide:  Negative  -Patellar apprehension:  Negative  -Medial Rosas's:  Negative  -Lateral Rosas's:  Negative  -Valgus stress:  Negative  -Varus stress:  Negative  -Lachman test:  Negative  -Anterior drawer:  Negative  -Posterior drawer:  Negative    Other:  -Intact sensation to light touch distally.  -No signs of cyanosis. Normal skin temperature of the lower extremity.  -Foot/ankle:  No gross deformity. Full range of motion.  -Right knee:  No gross deformity. No palpable tenderness. Normal strength and ROM.      IMAGIN2013: 3 view left knee x-ray  -Mild bilateral medial tibiofemoral compartment joint space narrowing.  No significant osteophytosis.    2019: 3 view left knee x-ray  -Mild medial tibiofemoral compartment joint space narrowing.  Slight narrowing compared to x-rays from 2013.  Chondrocalcinosis as developed since 2013 x-ray.    7/15/2019: MRI left knee  -ACL, PCL, LCL, and MCL appear intact.  Degenerative changes at the medial meniscus.    2021: 3 view left knee x-ray  -Moderate medial tibiofemoral compartment joint space narrowing, advanced since previous x-rays in 2019.      ASSESSMENT/PLAN:  Diagnoses and all orders for this visit:  Primary osteoarthritis of left knee  -     Orthopedic & Spine  Referral  -     XR Knee Standing 2v  Bilateral & 2v Left  -     lidocaine (PF) (XYLOCAINE) 1 % injection 2 mL  -     OMNICELL bupivacaine (MARCAINE) preservative free injection 0.5%  -     triamcinolone (KENALOG-40) injection 40 mg  -     DRAIN/INJECT LARGE JOINT/BURSA    60-year-old male coming in for evaluation of left knee pain consistent with osteoarthritis.  Knee x-rays from , , and  as well as MRI from 2019 were all personally reviewed with the findings as demonstrated above by my interpretation.  We discussed treatment options for this condition to include aerobic  exercise, physical therapy, maintaining appropriate body weight, over-the-counter medications, prescription medications, injection therapy, and surgical referral.  After reviewing the risks/benefits patient would like to proceed with an intra-articular left knee injection.  See procedure note below:    PROCEDURE:  Intraarticular Injection of the Left Knee     PROCEDURAL PAUSE:    A procedural pause was conducted to verify:  correct patient identity, procedure to be performed, and as applicable, correct side, site, and correct patient position.      INFORMED CONSENT:   I discussed the risks, possible benefits, and alternatives to injection.  Following denial of allergy and review of potential side effects and complications (including but not necessarily limited to infection, allergic reaction, fat necrosis, skin depigmentation, local tissue breakdown, systemic effects of corticosteroids, elevation of blood glucose, injury to soft tissue and/or nerves, and seizure), all questions were answered, and consent was given to proceed.  Patient verbalized understanding.      PROCEDURE DETAILS:    Side and site were marked, and a time out was performed to verify appropriate patient identifiers.  Following this the left knee, just superior to the tibial plateau and medial to the patellar tendon, was prepped with chlorhexidine.  Utilizing a 22-gauge needle the left intraarticular knee was injected using a anteromedial to posterolateral approach with 2mL of 1% Lidocaine, 2mL of 0.5% bupivacaine, and 1mL triamcinolone (40mg/mL).  0mL was wasted.      The patient tolerated the procedure without complication and was discharged in good condition after a short observation period.  The patient was instructed to contact me regarding any questions pertaining to the procedure.      DIAGNOSIS:    -Successful injection of the left intraarticular knee without immediate complication      PLAN:   -Post-procedure care reviewed, including  avoiding submersion of the injection site for 48 hours   -Return precautions reviewed for signs/symptoms that would be concerning for infection   -The patient was instructed to ice and take APAP this evening if needed  -Discussed the importance of continued activity  -Reiterated the importance of physical therapy and quadricep strengthening  -Discussed maintaining appropriate weight  -Return to clinic as needed      Gerald Ortiz MD  4/19/2021  7:44 AM    Total time spent with this patient was 41 minutes which included chart review, visualization and interpretation of images, time spent with the patient, and documentation.

## 2021-04-19 NOTE — Clinical Note
Isac Oscar,    I saw your patient in the office this morning.  We performed an injection into his left knee.  I will see him back as needed.  Please let me know if you have questions.  Thanks.    Gerald

## 2021-07-19 DIAGNOSIS — G47.00 INSOMNIA, UNSPECIFIED TYPE: ICD-10-CM

## 2021-07-19 RX ORDER — TEMAZEPAM 30 MG
CAPSULE ORAL
Qty: 90 CAPSULE | Refills: 1 | Status: CANCELLED | OUTPATIENT
Start: 2021-07-19

## 2021-07-19 NOTE — TELEPHONE ENCOUNTER
temazepam (RESTORIL) 30 MG capsule        Last Written Prescription Date:  4/27/21  Last Fill Quantity: 90,   # refills: 1  Last Office Visit: 8/6/20  Future Office visit:       Routing refill request to provider for review/approval because:    Drug not on the [facility], [facility] or [facility] refill protocol or controlled substance    PCP: Linda Wayne

## 2021-07-19 NOTE — TELEPHONE ENCOUNTER
He should get this from his primary clinic.  It looks like it is with Dr. Oscar.  Thanks.  Gallito Salinas MD

## 2021-08-02 NOTE — TELEPHONE ENCOUNTER
Patient called back and notified to call his PCP for medication. Patient verbalized understanding.

## 2021-08-03 ENCOUNTER — MYC MEDICAL ADVICE (OUTPATIENT)
Dept: FAMILY MEDICINE | Facility: OTHER | Age: 61
End: 2021-08-03

## 2021-08-03 DIAGNOSIS — G47.00 INSOMNIA, UNSPECIFIED TYPE: ICD-10-CM

## 2021-08-03 RX ORDER — TEMAZEPAM 30 MG
CAPSULE ORAL
Qty: 90 CAPSULE | Refills: 1 | OUTPATIENT
Start: 2021-08-03

## 2021-08-03 NOTE — TELEPHONE ENCOUNTER
Pending Prescriptions:                       Disp   Refills    temazepam (RESTORIL) 30 MG capsule        90 cap*1            Sig: TAKE 1 CAPSULE(30 MG) BY MOUTH EVERY NIGHT AS           NEEDED FOR SLEEP       [facility]  [facility] [place]      Pediatric Call Center [phone], option 1  Terri Dow RN Care Coordinator:  [phone]    After Hours Emergency:  [phone].  Ask for the on-call pediatric doctor for the specialty you are calling for be paged.    Prescription Renewals:  Please call your pharmacy first.  Your pharmacy must fax requests to [phone].  Please allow 2-3 days for prescriptions to be authorized.    If your physician has ordered a CT or MRI, you may schedule this test by calling [facility] in [place] at [phone].    **If your child is having a sedated procedure, they will need a history and physical done at their Primary Care Provider within 30 days of the procedure.  If your child was seen by the ordering provider in our office within 30 days of the procedure, their visit summary will work for the H&P unless they inform you otherwise.  If you have any questions, please call the RN Care Coordinator.**

## 2021-08-04 ENCOUNTER — MYC MEDICAL ADVICE (OUTPATIENT)
Dept: FAMILY MEDICINE | Facility: OTHER | Age: 61
End: 2021-08-04

## 2021-08-09 NOTE — TELEPHONE ENCOUNTER
temazepam (RESTORIL) 30 MG capsule 90 capsule 1 4/27/2021  No   Sig: TAKE 1 CAPSULE(30 MG) BY MOUTH EVERY NIGHT AS NEEDED FOR SLEEP     [facility] called regarding about Rx. He has a refill available on this medication. The person I talked to states he hasn't requested the refill. When told that Patient is stating that he was denied the refill they states he might have been requesting it too soon.   They are now filling it and Patient notified through [facility].   Zhanna Frost LPN ..........8/9/2021 9:50 AM

## 2021-08-09 NOTE — TELEPHONE ENCOUNTER
Can you call [facility] and find out why he can't get the refill that was previously sent in. This is confusing to me. AET

## 2021-08-14 DIAGNOSIS — F41.9 ANXIETY: ICD-10-CM

## 2021-08-17 RX ORDER — SERTRALINE HYDROCHLORIDE 100 MG/1
TABLET, FILM COATED ORAL
Qty: 90 TABLET | Refills: 1 | OUTPATIENT
Start: 2021-08-17

## 2021-08-20 ENCOUNTER — MYC MEDICAL ADVICE (OUTPATIENT)
Dept: FAMILY MEDICINE | Facility: OTHER | Age: 61
End: 2021-08-20

## 2021-08-20 DIAGNOSIS — F41.9 ANXIETY: ICD-10-CM

## 2021-08-23 RX ORDER — SERTRALINE HYDROCHLORIDE 100 MG/1
100 TABLET, FILM COATED ORAL DAILY
Qty: 90 TABLET | Refills: 3 | Status: SHIPPED | OUTPATIENT
Start: 2021-08-23 | End: 2021-11-10

## 2021-10-09 ENCOUNTER — HEALTH MAINTENANCE LETTER (OUTPATIENT)
Age: 61
End: 2021-10-09

## 2021-10-15 ENCOUNTER — MYC MEDICAL ADVICE (OUTPATIENT)
Dept: FAMILY MEDICINE | Facility: OTHER | Age: 61
End: 2021-10-15
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
## 2021-10-15 RX ORDER — DOXYCYCLINE HYCLATE 100 MG
200 TABLET ORAL ONCE
Qty: 2 TABLET | Refills: 0 | Status: SHIPPED | OUTPATIENT
Start: 2021-10-15 | End: 2021-10-15

## 2021-11-10 ENCOUNTER — MYC MEDICAL ADVICE (OUTPATIENT)
Dept: FAMILY MEDICINE | Facility: OTHER | Age: 61
End: 2021-11-10
Payer: COMMERCIAL

## 2021-11-10 DIAGNOSIS — F41.9 ANXIETY: ICD-10-CM

## 2021-11-10 DIAGNOSIS — M25.569 KNEE PAIN, UNSPECIFIED CHRONICITY, UNSPECIFIED LATERALITY: Primary | ICD-10-CM

## 2021-11-10 RX ORDER — SERTRALINE HYDROCHLORIDE 100 MG/1
100 TABLET, FILM COATED ORAL DAILY
Qty: 90 TABLET | Refills: 3 | Status: SHIPPED | OUTPATIENT
Start: 2021-11-10 | End: 2022-10-03

## 2022-01-29 ENCOUNTER — HEALTH MAINTENANCE LETTER (OUTPATIENT)
Age: 62
End: 2022-01-29

## 2022-06-28 ENCOUNTER — MYC MEDICAL ADVICE (OUTPATIENT)
Dept: FAMILY MEDICINE | Facility: OTHER | Age: 62
End: 2022-06-28

## 2022-06-28 DIAGNOSIS — G47.00 INSOMNIA, UNSPECIFIED TYPE: ICD-10-CM

## 2022-06-29 RX ORDER — TEMAZEPAM 30 MG
CAPSULE ORAL
Qty: 90 CAPSULE | Refills: 0 | Status: SHIPPED | OUTPATIENT
Start: 2022-06-29 | End: 2023-10-05

## 2022-06-29 NOTE — TELEPHONE ENCOUNTER
Will route to another provider in Leah Potter MD absence.   Barb Man LPN, LPN  6/29/2022  11:38 AM

## 2022-06-29 NOTE — TELEPHONE ENCOUNTER
--#90 day sent; needs appointment with PCP.    --Can get second shingles vaccine at any pharmacy without prescription as well in the meantime if he desires.    Jeannette Salgado, DO

## 2022-09-17 ENCOUNTER — HEALTH MAINTENANCE LETTER (OUTPATIENT)
Age: 62
End: 2022-09-17

## 2022-09-30 DIAGNOSIS — F41.9 ANXIETY: ICD-10-CM

## 2022-09-30 NOTE — LETTER
October 3, 2022      Terry Kemp  [address] [facility] [address]        Dear Terry,       This letter is to remind you that you are due for your annual exam with Leah Oscar. Your last comprehensive visit was more than 12 months ago.    Additional refills require you to complete this appointment.    Please call the clinic at [phone] to schedule your appointment.    If you should require additional refills before your scheduled appointment, please contact your pharmacy and we will refill your medication until the date of your appointment.    If you are no longer seeing Leah Oscar for primary care, please call to let us know.       Thank you for choosing [facility] and [facility] for your health care needs.    Sincerely,    Refill RN  [facility]

## 2022-10-03 RX ORDER — SERTRALINE HYDROCHLORIDE 100 MG/1
TABLET, FILM COATED ORAL
Qty: 90 TABLET | Refills: 3 | Status: SHIPPED | OUTPATIENT
Start: 2022-10-03 | End: 2023-09-11

## 2022-10-03 NOTE — TELEPHONE ENCOUNTER
Routing refill request to provider for review/approval because:    LOV: 4/03/20    Patient is due for annual review    Patient due for annual review.  Letter and my chart message sent.  Will route to outreach to call patient and help assist patient in scheduling an appointment.    Soumya Jordan RN on 10/3/2022 at 11:51 AM

## 2022-11-14 ENCOUNTER — OFFICE VISIT (OUTPATIENT)
Dept: FAMILY MEDICINE | Facility: OTHER | Age: 62
End: 2022-11-14
Attending: FAMILY MEDICINE
Payer: COMMERCIAL

## 2022-11-14 VITALS
BODY MASS INDEX: 29.01 KG/M2 | SYSTOLIC BLOOD PRESSURE: 130 MMHG | DIASTOLIC BLOOD PRESSURE: 72 MMHG | WEIGHT: 181.6 LBS | RESPIRATION RATE: 16 BRPM | HEART RATE: 85 BPM | TEMPERATURE: 97.7 F | OXYGEN SATURATION: 98 %

## 2022-11-14 DIAGNOSIS — M17.12 PRIMARY OSTEOARTHRITIS OF LEFT KNEE: ICD-10-CM

## 2022-11-14 DIAGNOSIS — M17.11 PRIMARY OSTEOARTHRITIS OF RIGHT KNEE: Primary | ICD-10-CM

## 2022-11-14 PROCEDURE — 20610 DRAIN/INJ JOINT/BURSA W/O US: CPT | Mod: 50 | Performed by: FAMILY MEDICINE

## 2022-11-14 PROCEDURE — 250N000009 HC RX 250: Performed by: FAMILY MEDICINE

## 2022-11-14 PROCEDURE — 99214 OFFICE O/P EST MOD 30 MIN: CPT | Mod: 25 | Performed by: FAMILY MEDICINE

## 2022-11-14 PROCEDURE — 250N000011 HC RX IP 250 OP 636: Performed by: FAMILY MEDICINE

## 2022-11-14 RX ORDER — TRIAMCINOLONE ACETONIDE 40 MG/ML
40 INJECTION, SUSPENSION INTRA-ARTICULAR; INTRAMUSCULAR ONCE
Status: COMPLETED | OUTPATIENT
Start: 2022-11-14 | End: 2022-11-14

## 2022-11-14 RX ORDER — LIDOCAINE HYDROCHLORIDE 10 MG/ML
2 INJECTION, SOLUTION EPIDURAL; INFILTRATION; INTRACAUDAL; PERINEURAL ONCE
Status: COMPLETED | OUTPATIENT
Start: 2022-11-14 | End: 2022-11-14

## 2022-11-14 RX ORDER — BUPIVACAINE HYDROCHLORIDE 5 MG/ML
2 INJECTION, SOLUTION EPIDURAL; INTRACAUDAL ONCE
Status: COMPLETED | OUTPATIENT
Start: 2022-11-14 | End: 2022-11-14

## 2022-11-14 RX ADMIN — BUPIVACAINE HYDROCHLORIDE 10 MG: 5 INJECTION, SOLUTION EPIDURAL; INTRACAUDAL; PERINEURAL at 11:02

## 2022-11-14 RX ADMIN — TRIAMCINOLONE ACETONIDE 40 MG: 40 INJECTION, SUSPENSION INTRA-ARTICULAR; INTRAMUSCULAR at 11:02

## 2022-11-14 RX ADMIN — LIDOCAINE HYDROCHLORIDE 2 ML: 10 INJECTION, SOLUTION INFILTRATION; PERINEURAL at 11:02

## 2022-11-14 ASSESSMENT — PAIN SCALES - GENERAL: PAINLEVEL: EXTREME PAIN (8)

## 2022-11-14 NOTE — NURSING NOTE
"Chief Complaint   Patient presents with     Knee Pain     Bilateral knee pain, wants injections      Patient is here for bilateral knee pain, wants injections. Pain 6-8/10.     Initial /72 (BP Location: Right arm, Patient Position: Sitting, Cuff Size: Adult Regular)   Pulse 85   Temp 97.7  F (36.5  C) (Tympanic)   Resp 16   Wt 82.4 kg (181 lb 9.6 oz)   SpO2 98%   BMI 29.01 kg/m   Estimated body mass index is 29.01 kg/m  as calculated from the following:    Height as of 4/19/21: 1.685 m (5' 6.34\").    Weight as of this encounter: 82.4 kg (181 lb 9.6 oz).       Medication Reconciliation: Complete    Luiza Nicolas LPN .......  11/14/2022  10:17 AM   "

## 2022-11-14 NOTE — PROGRESS NOTES
Responded via my [employer] portal       Unfortunately at this time Dr. Schmitt is on indefinite medical leave and he will not be returning. We would like to assist you with getting rescheduled with a new provider to establish care. We have 4 providers who are currently taking new patients. Dr. Red with Internal Medicine and Dr. Krishnan, Dr. Abbott or Dr. Bird with Family Practice:          Junior Red DO  Specialty Internal Medicine   [facility] in [facility] [address]  [address] [address] [place] [address]  Ph: [phone]  Fax: [phone]  Ages: 18 and older  [email or web address]         Janet Rey MD   Specialty [facility]   [facility] in [facility] [address]  [address] [address] [place] [address]  Ph: [phone]  Fax: [phone]  [email or web address]          Candy Bird MD  [facility] in [facility] [address]  [address] [address] [place] [address]  Ph: [phone]  Fax: [phone]  [email or web address]        Ender Abbott MD  [facility] in [facility] [address]  [address] [address] [place] [address]  Ph: [phone]  Fax: [phone]  [email or web address]    We apologize for inconvenience that may cause you.         Confidentiality Notice: This electronic transmission, including attachments, is intended for the sole use of the individual or entity to which it is addressed. It may contain information that is confidential, proprietary, or legally privileged and exempt from disclosure under applicable law. If the reader of this message is not the intended recipient, you are hereby notified that any unauthorized review, distribution, or copying of this communication is strictly prohibited. If you have received this communication in error please notify me immediately by electronic mail and destroy all copies of the original transmission.           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  He has known arthritis of the bilateral knees.  Previously his left knee has been more bothersome.  He was last seen in this office in April 2021.  At that time he received an injection into his left knee.  He reports significant pain relief for many months with this intervention.  Over this last summer his symptoms have been more bothersome.  He currently rates his pain 7-8/10.  He characterizes the pain as stabbing, aching, and throbbing.  He is hoping to be able to get back to golf, particularly walking in more of a pain-free manner.      EXAM:  /72 (BP Location: Right arm, Patient Position: Sitting, Cuff Size: Adult Regular)   Pulse 85   Temp 97.7  F (36.5  C) (Tympanic)   Resp 16   Wt 82.4 kg (181 lb 9.6 oz)   SpO2 98%   BMI 29.01 kg/m    MUSCULOSKELETAL EXAM:  RIGHT KNEE  Inspection:  -No gross deformity  -No bruising or soft tissue swelling  -Scars:  None    Tenderness:  - Tenderness to palpation of the medial joint line    Range of Motion:  -Passive extension:  0    Strength:  -Extension:  5/5  -Flexion:  5/5    Special Tests:  -Effusion:  Absent  -Medial Rosas's: Positive  -Valgus stress:  Negative  -Varus stress:  Negative    Other:  -Intact sensation to light touch distally.  -No signs of cyanosis. Normal skin temperature of the lower extremity.  -Foot/ankle:  No gross deformity. Full range of motion.    MUSCULOSKELETAL EXAM:  LEFT KNEE  Inspection:  -No gross deformity  -No bruising or soft tissue swelling  -Scars:  None    Tenderness:  - Mild tenderness to palpation of the medial joint line    Range of Motion:  -Passive extension:  0    Strength:  -Extension:  5/5  -Flexion:  5/5    Special Tests:  -Effusion:  Absent  -Medial Rosas's: Positive  -Valgus stress:  Negative  -Varus stress:  Negative    Other:  -Intact sensation to light touch distally.  -No signs of cyanosis.  Normal skin temperature of the lower extremity.  -Foot/ankle:  No gross deformity. Full range of motion.      IMAGIN2021: 4 view left knee x-ray  -Moderate medial tibiofemoral compartment joint space narrowing, advanced since previous x-rays in 2019.      ASSESSMENT/PLAN:  Diagnoses and all orders for this visit:  Primary osteoarthritis of right knee  -     DRAIN/INJECT LARGE JOINT/BURSA  -     triamcinolone (KENALOG-40) injection 40 mg  -     lidocaine (PF) (XYLOCAINE) 1 % injection 2 mL  -     Bupivacaine 0.5% 2mL Intra-articular  Primary osteoarthritis of left knee  -     DRAIN/INJECT LARGE JOINT/BURSA  -     triamcinolone (KENALOG-40) injection 40 mg  -     lidocaine (PF) (XYLOCAINE) 1 % injection 2 mL  -     Bupivacaine 0.5% 2mL Intra-articular    62-year-old male coming in for follow-up evaluation of bilateral knee pain.  He has known osteoarthritis of both knees.  X-rays from 2021 were personally reviewed in the office with the findings as demonstrated above by my interpretation.  Treatment options for this include rest, aerobic activity, activity modification, physical therapy, weight loss, OTC medications, prescription medications, injections, and surgery.  At this time this patient is not interested in pursuing surgical interventions.  As he received significant pain relief with his last injection on the left knee, he is interested in proceeding with this on the right.  Risks and benefits were reviewed.  See procedure note below:    PROCEDURE:  Intraarticular Injection of the Bilateral Knees     PROCEDURAL PAUSE:    A procedural pause was conducted to verify:  correct patient identity, procedure to be performed, and as applicable, correct side, site, and correct patient position.      INFORMED CONSENT:   I discussed the risks, possible benefits, and alternatives to injection.  Following denial of allergy and review of potential side effects and complications (including but not necessarily  limited to infection, allergic reaction, fat necrosis, skin depigmentation, local tissue breakdown, systemic effects of corticosteroids, elevation of blood glucose, injury to soft tissue and/or nerves, and seizure), all questions were answered, and consent was given to proceed.  Patient verbalized understanding.      PROCEDURE DETAILS:    Side and site were marked, and a time out was performed to verify appropriate patient identifiers.  Following this the right knee, just superior to the tibial plateau and medial to the patellar tendon, was prepped with chlorhexidine.  Utilizing a 22-gauge needle the right intraarticular knee was injected using a anteromedial to posterolateral approach with 2mL of 1% Lidocaine, 2mL of 0.5% bupivacaine, and 1mL triamcinolone (40mg/mL).  0mL was wasted.    Side and site were marked, and a time out was performed to verify appropriate patient identifiers.  Following this the left knee, just superior to the tibial plateau and medial to the patellar tendon, was prepped with chlorhexidine.  Utilizing a 22-gauge needle the left intraarticular knee was injected using a anteromedial to posterolateral approach with 2mL of 1% Lidocaine, 2mL of 0.5% bupivacaine, and 1mL triamcinolone (40mg/mL).  0mL was wasted.      The patient tolerated the procedures without complication and was discharged in good condition after a short observation period.  The patient was instructed to contact me regarding any questions pertaining to the procedure.      DIAGNOSIS:    -Successful injection of the bilateral intraarticular knees without immediate complication      PLAN:   -Post-procedure care reviewed, including avoiding submersion of the injection site for 48 hours   -Return precautions reviewed for signs/symptoms that would be concerning for infection   -The patient was instructed to ice and take APAP this evening if needed   -Return to clinic as needed      Gerald Ortiz MD  11/14/2022  10:52 AM    Total time  spent with this patient was 38 minutes which included chart review, visualization and independent interpretation of images, time spent with the patient, and documentation.    Procedure time:  5 minute(s)

## 2022-12-21 ENCOUNTER — OFFICE VISIT (OUTPATIENT)
Dept: FAMILY MEDICINE | Facility: OTHER | Age: 62
End: 2022-12-21
Attending: FAMILY MEDICINE
Payer: COMMERCIAL

## 2022-12-21 VITALS
WEIGHT: 179 LBS | BODY MASS INDEX: 28.09 KG/M2 | DIASTOLIC BLOOD PRESSURE: 80 MMHG | SYSTOLIC BLOOD PRESSURE: 142 MMHG | HEART RATE: 78 BPM | TEMPERATURE: 96.9 F | RESPIRATION RATE: 18 BRPM | HEIGHT: 67 IN | OXYGEN SATURATION: 98 %

## 2022-12-21 DIAGNOSIS — E78.00 PURE HYPERCHOLESTEROLEMIA: ICD-10-CM

## 2022-12-21 DIAGNOSIS — Z00.00 HEALTH CARE MAINTENANCE: Primary | ICD-10-CM

## 2022-12-21 LAB
ALBUMIN SERPL BCG-MCNC: 4.6 G/DL (ref 3.5–5.2)
ALP SERPL-CCNC: 55 U/L (ref 40–129)
ALT SERPL W P-5'-P-CCNC: 39 U/L (ref 10–50)
ANION GAP SERPL CALCULATED.3IONS-SCNC: 9 MMOL/L (ref 7–15)
AST SERPL W P-5'-P-CCNC: 25 U/L (ref 10–50)
BILIRUB SERPL-MCNC: 0.4 MG/DL
BUN SERPL-MCNC: 20.6 MG/DL (ref 8–23)
CALCIUM SERPL-MCNC: 10.1 MG/DL (ref 8.8–10.2)
CHLORIDE SERPL-SCNC: 104 MMOL/L (ref 98–107)
CHOLEST SERPL-MCNC: 282 MG/DL
CREAT SERPL-MCNC: 1.03 MG/DL (ref 0.67–1.17)
DEPRECATED HCO3 PLAS-SCNC: 28 MMOL/L (ref 22–29)
GFR SERPL CREATININE-BSD FRML MDRD: 81 ML/MIN/1.73M2
GLUCOSE SERPL-MCNC: 94 MG/DL (ref 70–99)
HBA1C MFR BLD: 5.9 % (ref 4–6.2)
HDLC SERPL-MCNC: 64 MG/DL
HOLD SPECIMEN: NORMAL
LDLC SERPL CALC-MCNC: 200 MG/DL
NONHDLC SERPL-MCNC: 218 MG/DL
POTASSIUM SERPL-SCNC: 5 MMOL/L (ref 3.4–5.3)
PROT SERPL-MCNC: 7.8 G/DL (ref 6.4–8.3)
PSA SERPL-MCNC: 2.63 NG/ML (ref 0–4.5)
SODIUM SERPL-SCNC: 141 MMOL/L (ref 136–145)
TRIGL SERPL-MCNC: 88 MG/DL

## 2022-12-21 PROCEDURE — 80061 LIPID PANEL: CPT | Mod: ZL | Performed by: FAMILY MEDICINE

## 2022-12-21 PROCEDURE — 87389 HIV-1 AG W/HIV-1&-2 AB AG IA: CPT | Mod: ZL | Performed by: FAMILY MEDICINE

## 2022-12-21 PROCEDURE — 36415 COLL VENOUS BLD VENIPUNCTURE: CPT | Mod: ZL | Performed by: FAMILY MEDICINE

## 2022-12-21 PROCEDURE — 86803 HEPATITIS C AB TEST: CPT | Mod: ZL | Performed by: FAMILY MEDICINE

## 2022-12-21 PROCEDURE — 83036 HEMOGLOBIN GLYCOSYLATED A1C: CPT | Mod: ZL | Performed by: FAMILY MEDICINE

## 2022-12-21 PROCEDURE — 80053 COMPREHEN METABOLIC PANEL: CPT | Mod: ZL | Performed by: FAMILY MEDICINE

## 2022-12-21 PROCEDURE — G0103 PSA SCREENING: HCPCS | Mod: ZL | Performed by: FAMILY MEDICINE

## 2022-12-21 PROCEDURE — 99396 PREV VISIT EST AGE 40-64: CPT | Performed by: FAMILY MEDICINE

## 2022-12-21 RX ORDER — ATORVASTATIN CALCIUM 40 MG/1
40 TABLET, FILM COATED ORAL DAILY
Qty: 90 TABLET | Refills: 3 | Status: SHIPPED | OUTPATIENT
Start: 2022-12-21 | End: 2023-11-28

## 2022-12-21 ASSESSMENT — ANXIETY QUESTIONNAIRES
8. IF YOU CHECKED OFF ANY PROBLEMS, HOW DIFFICULT HAVE THESE MADE IT FOR YOU TO DO YOUR WORK, TAKE CARE OF THINGS AT HOME, OR GET ALONG WITH OTHER PEOPLE?: NOT DIFFICULT AT ALL
3. WORRYING TOO MUCH ABOUT DIFFERENT THINGS: NOT AT ALL
4. TROUBLE RELAXING: NOT AT ALL
7. FEELING AFRAID AS IF SOMETHING AWFUL MIGHT HAPPEN: NOT AT ALL
1. FEELING NERVOUS, ANXIOUS, OR ON EDGE: NOT AT ALL
2. NOT BEING ABLE TO STOP OR CONTROL WORRYING: NOT AT ALL
IF YOU CHECKED OFF ANY PROBLEMS ON THIS QUESTIONNAIRE, HOW DIFFICULT HAVE THESE PROBLEMS MADE IT FOR YOU TO DO YOUR WORK, TAKE CARE OF THINGS AT HOME, OR GET ALONG WITH OTHER PEOPLE: NOT DIFFICULT AT ALL
GAD7 TOTAL SCORE: 0
5. BEING SO RESTLESS THAT IT IS HARD TO SIT STILL: NOT AT ALL
6. BECOMING EASILY ANNOYED OR IRRITABLE: NOT AT ALL
7. FEELING AFRAID AS IF SOMETHING AWFUL MIGHT HAPPEN: NOT AT ALL
GAD7 TOTAL SCORE: 0

## 2022-12-21 ASSESSMENT — ENCOUNTER SYMPTOMS
ARTHRALGIAS: 1
CHILLS: 0
SHORTNESS OF BREATH: 0
DYSURIA: 0
HEADACHES: 0
SORE THROAT: 0
HEMATOCHEZIA: 0
COUGH: 0
MYALGIAS: 1
PARESTHESIAS: 0
DIZZINESS: 0
PALPITATIONS: 0
WEAKNESS: 0
DIARRHEA: 0
EYE PAIN: 0
HEARTBURN: 1
NAUSEA: 0
NERVOUS/ANXIOUS: 0
CONSTIPATION: 0
FREQUENCY: 0
HEMATURIA: 0
FEVER: 0
ABDOMINAL PAIN: 0
JOINT SWELLING: 0

## 2022-12-21 ASSESSMENT — PAIN SCALES - GENERAL: PAINLEVEL: NO PAIN (0)

## 2022-12-21 NOTE — NURSING NOTE
"Chief Complaint   Patient presents with     Physical     Yearly physical        Initial BP (!) 154/88   Pulse 78   Temp 96.9  F (36.1  C)   Resp 18   Ht 1.702 m (5' 7\")   Wt 81.2 kg (179 lb)   SpO2 98%   BMI 28.04 kg/m   Estimated body mass index is 28.04 kg/m  as calculated from the following:    Height as of this encounter: 1.702 m (5' 7\").    Weight as of this encounter: 81.2 kg (179 lb).  Medication Reconciliation: complete    Zhanna Frost, LPN    Advance Care Directive reviewed    "

## 2022-12-21 NOTE — PATIENT INSTRUCTIONS
[email or web address]://[email or web address].org/sections/health-[email or web address]/09/[email or web address]-can-work-[email or web address]-well-[email or web address]-medicine-to-reduce-[email or web address]-blood-pressure    DASH Diet (low salt, high potassium)    Monitor blood pressure.

## 2022-12-21 NOTE — PROGRESS NOTES
"  Assessment & Plan       ICD-10-CM    1. Health care maintenance  Z00.00 PSA Screen GH     Lipid Panel     Hemoglobin A1c     DIANE([employer])     Comprehensive Metabolic Panel     Hepatitis C Screen Reflex to HCV RNA Quant and Genotype     HIV Antigen Antibody Combo     PSA Screen GH     Lipid Panel     Hemoglobin A1c     Comprehensive Metabolic Panel     Hepatitis C Screen Reflex to HCV RNA Quant and Genotype     HIV Antigen Antibody Combo      2. Pure hypercholesterolemia  E78.00 atorvastatin (LIPITOR) 40 MG tablet        Labs today.  Discussed options for colon cancer screening, Diane ordered.  Reviewed previous lipid profile.  This was done while on 10 mg of Lipitor.  He had lost a lot of weight at that point due to untreated anxiety.  Based on overall risk factors, would recommend that he resume statins, but will await lab results before determining dose.  Continue Zoloft for management of anxiety.  Sleep much better, no Restoril refill provided.  Recommend pretty close monitoring of blood pressure.  Discussed recommendations for DASH diet, other interventions that may be helpful.  Consider starting medications if blood pressure remains elevated.     BMI:   Estimated body mass index is 28.04 kg/m  as calculated from the following:    Height as of this encounter: 1.702 m (5' 7\").    Weight as of this encounter: 81.2 kg (179 lb).           No follow-ups on file.    Leah Oscar MD  [facility] AND \[facility] [place] [facility]\""    Stephane Stewart is a 62 year old, presenting for the following health issues:  Physical (Yearly physical )      Healthy Habits:     Getting at least 3 servings of Calcium per day:  Yes    Bi-annual eye exam:  Yes    Dental care twice a year:  Yes    Sleep apnea or symptoms of sleep apnea:  None    Diet:  Regular (no restrictions)    Frequency of exercise:  2-3 days/week    Duration of exercise:  15-30 minutes    Taking medications regularly:  Yes    Medication side effects:  Other   " " PHQ-2 Total Score: 0    Additional concerns today:  No       The 10-year ASCVD risk score (Christiano STAUFFER, et al., 2019) is: 15.5%    Values used to calculate the score:      Age: 62 years      Sex: Male      Is Non- : No      Diabetic: No      Tobacco smoker: No      Systolic Blood Pressure: 142 mmHg      Is BP treated: No      HDL Cholesterol: 64 mg/dL      Total Cholesterol: 282 mg/dL        Review of Systems   Constitutional: Negative for chills and fever.   HENT: Positive for congestion. Negative for ear pain, hearing loss and sore throat.    Eyes: Negative for pain and visual disturbance.   Respiratory: Negative for cough and shortness of breath.    Cardiovascular: Negative for chest pain, palpitations and peripheral edema.   Gastrointestinal: Positive for heartburn. Negative for abdominal pain, constipation, diarrhea, hematochezia and nausea.   Genitourinary: Negative for dysuria, frequency, genital sores, hematuria, impotence, penile discharge and urgency.   Musculoskeletal: Positive for arthralgias and myalgias. Negative for joint swelling.   Skin: Negative for rash.   Neurological: Negative for dizziness, weakness, headaches and paresthesias.   Psychiatric/Behavioral: Negative for mood changes. The patient is not nervous/anxious.             Objective    BP (!) 142/80   Pulse 78   Temp 96.9  F (36.1  C)   Resp 18   Ht 1.702 m (5' 7\")   Wt 81.2 kg (179 lb)   SpO2 98%   BMI 28.04 kg/m    Body mass index is 28.04 kg/m .  Physical Exam  Constitutional:       Appearance: He is well-developed.   Eyes:      Conjunctiva/sclera: Conjunctivae normal.   Neck:      Thyroid: No thyromegaly.   Cardiovascular:      Rate and Rhythm: Normal rate and regular rhythm.      Heart sounds: Normal heart sounds. No murmur heard.  Pulmonary:      Effort: Pulmonary effort is normal. No respiratory distress.      Breath sounds: Normal breath sounds.   Abdominal:      Palpations: Abdomen is soft. "   Lymphadenopathy:      Cervical: No cervical adenopathy.   Skin:     Findings: No rash.   Neurological:      Mental Status: He is alert and oriented to person, place, and time.                         Answers for HPI/ROS submitted by the patient on 12/21/2022  KASEY 7 TOTAL SCORE: 0

## 2022-12-22 LAB
HCV AB SERPL QL IA: NONREACTIVE
HIV 1+2 AB+HIV1 P24 AG SERPL QL IA: NONREACTIVE

## 2023-01-19 LAB — NONINV COLON CA DNA+OCC BLD SCRN STL QL: NEGATIVE

## 2023-03-16 ENCOUNTER — MYC MEDICAL ADVICE (OUTPATIENT)
Dept: FAMILY MEDICINE | Facility: OTHER | Age: 63
End: 2023-03-16
Payer: COMMERCIAL

## 2023-03-20 ENCOUNTER — OFFICE VISIT (OUTPATIENT)
Dept: FAMILY MEDICINE | Facility: OTHER | Age: 63
End: 2023-03-20
Attending: FAMILY MEDICINE
Payer: COMMERCIAL

## 2023-03-20 VITALS
RESPIRATION RATE: 16 BRPM | HEIGHT: 67 IN | OXYGEN SATURATION: 95 % | WEIGHT: 180 LBS | DIASTOLIC BLOOD PRESSURE: 82 MMHG | BODY MASS INDEX: 28.25 KG/M2 | HEART RATE: 97 BPM | SYSTOLIC BLOOD PRESSURE: 136 MMHG

## 2023-03-20 DIAGNOSIS — M17.11 PRIMARY OSTEOARTHRITIS OF RIGHT KNEE: Primary | ICD-10-CM

## 2023-03-20 DIAGNOSIS — M17.12 PRIMARY OSTEOARTHRITIS OF LEFT KNEE: ICD-10-CM

## 2023-03-20 PROCEDURE — 20610 DRAIN/INJ JOINT/BURSA W/O US: CPT | Mod: 50 | Performed by: FAMILY MEDICINE

## 2023-03-20 PROCEDURE — 250N000011 HC RX IP 250 OP 636: Performed by: FAMILY MEDICINE

## 2023-03-20 PROCEDURE — 250N000009 HC RX 250: Performed by: FAMILY MEDICINE

## 2023-03-20 PROCEDURE — 99214 OFFICE O/P EST MOD 30 MIN: CPT | Mod: 25 | Performed by: FAMILY MEDICINE

## 2023-03-20 RX ORDER — BUPIVACAINE HYDROCHLORIDE 5 MG/ML
2 INJECTION, SOLUTION EPIDURAL; INTRACAUDAL ONCE
Status: COMPLETED | OUTPATIENT
Start: 2023-03-20 | End: 2023-03-20

## 2023-03-20 RX ORDER — TRIAMCINOLONE ACETONIDE 40 MG/ML
40 INJECTION, SUSPENSION INTRA-ARTICULAR; INTRAMUSCULAR ONCE
Status: COMPLETED | OUTPATIENT
Start: 2023-03-20 | End: 2023-03-20

## 2023-03-20 RX ORDER — LIDOCAINE HYDROCHLORIDE 10 MG/ML
2 INJECTION, SOLUTION EPIDURAL; INFILTRATION; INTRACAUDAL; PERINEURAL ONCE
Status: COMPLETED | OUTPATIENT
Start: 2023-03-20 | End: 2023-03-20

## 2023-03-20 RX ADMIN — BUPIVACAINE HYDROCHLORIDE 10 MG: 5 INJECTION, SOLUTION EPIDURAL; INTRACAUDAL; PERINEURAL at 08:05

## 2023-03-20 RX ADMIN — TRIAMCINOLONE ACETONIDE 40 MG: 40 INJECTION, SUSPENSION INTRA-ARTICULAR; INTRAMUSCULAR at 08:05

## 2023-03-20 RX ADMIN — LIDOCAINE HYDROCHLORIDE 2 ML: 10 INJECTION, SOLUTION INFILTRATION; PERINEURAL at 08:04

## 2023-03-20 RX ADMIN — LIDOCAINE HYDROCHLORIDE 2 ML: 10 INJECTION, SOLUTION INFILTRATION; PERINEURAL at 08:05

## 2023-03-20 ASSESSMENT — PAIN SCALES - GENERAL: PAINLEVEL: SEVERE PAIN (7)

## 2023-03-20 NOTE — PROGRESS NOTES
"Sports Medicine Office Note    HPI:  62-year-old male coming in for evaluation of bilateral knee pain.  He was last seen in this office on 2022.  At that time he received bilateral CSI's to his knees.  He reports significant symptom relief with this intervention.  His pain has returned.  He rates his pain at 7/10, right worse than left.  He characterizes the pain as stabbing.  He localizes the pain to the inner aspect of the knees.  No new injury.      EXAM:  /82 (BP Location: Right arm, Patient Position: Sitting, Cuff Size: Adult Regular)   Pulse 97   Resp 16   Ht 1.702 m (5' 7\")   Wt 81.6 kg (180 lb)   SpO2 95%   BMI 28.19 kg/m    Musculoskeletal exam: Bilateral knees  -No gross deformity  - No bruising or swelling  - Normal skin temperature without cyanosis  - Bilateral medial joint line tenderness      IMAGIN2021: 4 view left knee x-ray  -Moderate medial tibiofemoral compartment joint space narrowing, advanced since previous x-rays in 2019.  Chondrocalcinosis noted bilaterally.      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  X-rays from 2021 were again personally reviewed in the office with the findings as demonstrated above by my interpretation.  Treatment options for this include rest, aerobic activity, physical therapy, weight loss, topical medications, ice, oral medications, injections, and surgery.  After reviewing the risks/benefits, the patient would like to proceed " with bilateral intra-articular CSI's.  See procedure note below:    PROCEDURE:  Intraarticular Injection of the Bilateral Knees     PROCEDURAL PAUSE:    A procedural pause was conducted to verify:  correct patient identity, procedure to be performed, and as applicable, correct side, site, and correct patient position.      INFORMED CONSENT:   I discussed the risks, possible benefits, and alternatives to injection.  Following denial of allergy and review of potential side effects and complications (including but not necessarily limited to infection, allergic reaction, fat necrosis, skin depigmentation, local tissue breakdown, systemic effects of corticosteroids, elevation of blood glucose, injury to soft tissue and/or nerves, and seizure), all questions were answered, and consent was given to proceed.  Patient verbalized understanding.      PROCEDURE DETAILS:    Side and site were marked, and a time out was performed to verify appropriate patient identifiers.  Following this the right knee, just superior to the tibial plateau and medial to the patellar tendon, was prepped with chlorhexidine.  Utilizing a 22-gauge needle the right intraarticular knee was injected using a anteromedial to posterolateral approach with 2mL of 1% Lidocaine, 2mL of 0.5% bupivacaine, and 1mL triamcinolone (40mg/mL).  0mL was wasted.    Side and site were marked, and a time out was performed to verify appropriate patient identifiers.  Following this the left knee, just superior to the tibial plateau and medial to the patellar tendon, was prepped with chlorhexidine.  Utilizing a 22-gauge needle the left intraarticular knee was injected using a anteromedial to posterolateral approach with 2mL of 1% Lidocaine, 2mL of 0.5% bupivacaine, and 1mL triamcinolone (40mg/mL).  0mL was wasted.      The patient tolerated the procedures without complication and was discharged in good condition after a short observation period.  The patient was instructed to  contact me regarding any questions pertaining to the procedure.      DIAGNOSIS:    -Successful injection of the bilateral intraarticular knees without immediate complication      PLAN:   -Post-procedure care reviewed, including avoiding submersion of the injection site for 48 hours   -Return precautions reviewed for signs/symptoms that would be concerning for infection   -The patient was instructed to ice and take APAP this evening if needed  -Return to clinic as needed      Gerald Ortiz MD   3/20/2023  7:38 AM    Total time spent with this patient was 28 minutes which included chart review, visualization and independent interpretation of images, time spent with the patient, and documentation.    Procedure time:  4 minute(s)

## 2023-03-20 NOTE — NURSING NOTE
"Chief Complaint   Patient presents with     Follow Up     Bilateral knee pain     Patient presents for follow up bilateral knee pain, wants repeat injections. Pain 7/10.     Initial /82 (BP Location: Right arm, Patient Position: Sitting, Cuff Size: Adult Regular)   Pulse 97   Resp 16   Ht 1.702 m (5' 7\")   Wt 81.6 kg (180 lb)   SpO2 95%   BMI 28.19 kg/m   Estimated body mass index is 28.19 kg/m  as calculated from the following:    Height as of this encounter: 1.702 m (5' 7\").    Weight as of this encounter: 81.6 kg (180 lb).       Medication Reconciliation: Complete    Luiza Nicolas LPN .......  3/20/2023  7:46 AM   "

## 2023-03-27 DIAGNOSIS — F41.9 ANXIETY: ICD-10-CM

## 2023-03-28 RX ORDER — SERTRALINE HYDROCHLORIDE 100 MG/1
TABLET, FILM COATED ORAL
Qty: 90 TABLET | Refills: 3 | OUTPATIENT
Start: 2023-03-28

## 2023-03-28 NOTE — TELEPHONE ENCOUNTER
sertraline (ZOLOFT) 100 MG tablet 90 tablet 3 10/3/2022  No   Sig: TAKE 1 TABLET(100 MG) BY MOUTH DAILY     To [facility] [place] requesting.    Soumya Jordan RN on 3/28/2023 at 12:05 PM

## 2023-06-05 ENCOUNTER — MYC MEDICAL ADVICE (OUTPATIENT)
Dept: FAMILY MEDICINE | Facility: OTHER | Age: 63
End: 2023-06-05
Payer: COMMERCIAL

## 2023-07-10 ENCOUNTER — MYC MEDICAL ADVICE (OUTPATIENT)
Dept: FAMILY MEDICINE | Facility: OTHER | Age: 63
End: 2023-07-10
Payer: COMMERCIAL

## 2023-07-10 DIAGNOSIS — M25.569 KNEE PAIN, UNSPECIFIED CHRONICITY, UNSPECIFIED LATERALITY: ICD-10-CM

## 2023-07-10 DIAGNOSIS — M17.11 PRIMARY OSTEOARTHRITIS OF RIGHT KNEE: Primary | ICD-10-CM

## 2023-07-10 DIAGNOSIS — M17.12 PRIMARY OSTEOARTHRITIS OF LEFT KNEE: ICD-10-CM

## 2023-08-01 ENCOUNTER — MYC MEDICAL ADVICE (OUTPATIENT)
Dept: FAMILY MEDICINE | Facility: OTHER | Age: 63
End: 2023-08-01
Payer: COMMERCIAL

## 2023-08-07 ENCOUNTER — HOSPITAL ENCOUNTER (OUTPATIENT)
Dept: MRI IMAGING | Facility: OTHER | Age: 63
Discharge: HOME | End: 2023-08-07
Attending: ORTHOPAEDIC SURGERY | Admitting: ORTHOPAEDIC SURGERY
Payer: MEDICARE

## 2023-08-07 DIAGNOSIS — M25.362 INSTABILITY OF KNEE JOINT, LEFT: ICD-10-CM

## 2023-08-07 PROCEDURE — 73721 MRI JNT OF LWR EXTRE W/O DYE: CPT | Mod: LT

## 2023-08-30 DIAGNOSIS — M17.12 PRIMARY OSTEOARTHRITIS OF LEFT KNEE: Primary | ICD-10-CM

## 2023-09-07 DIAGNOSIS — F41.9 ANXIETY: ICD-10-CM

## 2023-09-09 DIAGNOSIS — F41.9 ANXIETY: ICD-10-CM

## 2023-09-11 ENCOUNTER — TELEPHONE (OUTPATIENT)
Dept: FAMILY MEDICINE | Facility: OTHER | Age: 63
End: 2023-09-11
Payer: COMMERCIAL

## 2023-09-11 DIAGNOSIS — F41.9 ANXIETY: ICD-10-CM

## 2023-09-11 RX ORDER — SERTRALINE HYDROCHLORIDE 100 MG/1
TABLET, FILM COATED ORAL
Qty: 90 TABLET | Refills: 0 | OUTPATIENT
Start: 2023-09-11

## 2023-09-11 RX ORDER — SERTRALINE HYDROCHLORIDE 100 MG/1
100 TABLET, FILM COATED ORAL DAILY
Qty: 90 TABLET | Refills: 3 | Status: SHIPPED | OUTPATIENT
Start: 2023-09-11

## 2023-09-11 NOTE — TELEPHONE ENCOUNTER
Please call the [patient.  His RX - Sertaline  wont fill at the pharmacy.  Why can't he pick it up?  He is out of this medication.      Pharmacy is [facility]  Stand Alone          Lorie Gongora on 9/11/2023 at 9:51 AM

## 2023-09-12 RX ORDER — SERTRALINE HYDROCHLORIDE 100 MG/1
TABLET, FILM COATED ORAL
Qty: 90 TABLET | Refills: 0 | OUTPATIENT
Start: 2023-09-12

## 2023-09-12 NOTE — TELEPHONE ENCOUNTER
sertraline (ZOLOFT) 100 MG tablet 90 tablet 3 9/11/2023  No   Sig - Route: Take 1 tablet (100 mg) by mouth kelly   To [facility]    [facility].  Should have refills.    Soumya Jordan RN on 9/12/2023 at 12:09 PM

## 2023-10-05 ENCOUNTER — OFFICE VISIT (OUTPATIENT)
Dept: FAMILY MEDICINE | Facility: OTHER | Age: 63
End: 2023-10-05
Attending: FAMILY MEDICINE
Payer: COMMERCIAL

## 2023-10-05 VITALS
HEIGHT: 67 IN | DIASTOLIC BLOOD PRESSURE: 88 MMHG | RESPIRATION RATE: 20 BRPM | TEMPERATURE: 98 F | SYSTOLIC BLOOD PRESSURE: 150 MMHG | OXYGEN SATURATION: 99 % | WEIGHT: 184.2 LBS | HEART RATE: 90 BPM | BODY MASS INDEX: 28.91 KG/M2

## 2023-10-05 DIAGNOSIS — Z01.818 PREOP GENERAL PHYSICAL EXAM: Primary | ICD-10-CM

## 2023-10-05 DIAGNOSIS — M17.12 PRIMARY OSTEOARTHRITIS OF LEFT KNEE: ICD-10-CM

## 2023-10-05 LAB
ANION GAP SERPL CALCULATED.3IONS-SCNC: 9 MMOL/L (ref 7–15)
BASO+EOS+MONOS # BLD AUTO: NORMAL 10*3/UL
BASO+EOS+MONOS NFR BLD AUTO: NORMAL %
BASOPHILS # BLD AUTO: 0 10E3/UL (ref 0–0.2)
BASOPHILS NFR BLD AUTO: 1 %
BUN SERPL-MCNC: 23.1 MG/DL (ref 8–23)
CALCIUM SERPL-MCNC: 10.4 MG/DL (ref 8.8–10.2)
CHLORIDE SERPL-SCNC: 101 MMOL/L (ref 98–107)
CREAT SERPL-MCNC: 1.44 MG/DL (ref 0.67–1.17)
DEPRECATED HCO3 PLAS-SCNC: 28 MMOL/L (ref 22–29)
EGFRCR SERPLBLD CKD-EPI 2021: 54 ML/MIN/1.73M2
EOSINOPHIL # BLD AUTO: 0.2 10E3/UL (ref 0–0.7)
EOSINOPHIL NFR BLD AUTO: 3 %
ERYTHROCYTE [DISTWIDTH] IN BLOOD BY AUTOMATED COUNT: 12.9 % (ref 10–15)
GLUCOSE SERPL-MCNC: 95 MG/DL (ref 70–99)
HCT VFR BLD AUTO: 43 % (ref 40–53)
HGB BLD-MCNC: 14.5 G/DL (ref 13.3–17.7)
IMM GRANULOCYTES # BLD: 0 10E3/UL
IMM GRANULOCYTES NFR BLD: 0 %
LYMPHOCYTES # BLD AUTO: 1.6 10E3/UL (ref 0.8–5.3)
LYMPHOCYTES NFR BLD AUTO: 20 %
MCH RBC QN AUTO: 29.7 PG (ref 26.5–33)
MCHC RBC AUTO-ENTMCNC: 33.7 G/DL (ref 31.5–36.5)
MCV RBC AUTO: 88 FL (ref 78–100)
MONOCYTES # BLD AUTO: 0.6 10E3/UL (ref 0–1.3)
MONOCYTES NFR BLD AUTO: 8 %
NEUTROPHILS # BLD AUTO: 5.4 10E3/UL (ref 1.6–8.3)
NEUTROPHILS NFR BLD AUTO: 68 %
NRBC # BLD AUTO: 0 10E3/UL
NRBC BLD AUTO-RTO: 0 /100
PLATELET # BLD AUTO: 257 10E3/UL (ref 150–450)
POTASSIUM SERPL-SCNC: 4.5 MMOL/L (ref 3.4–5.3)
RBC # BLD AUTO: 4.89 10E6/UL (ref 4.4–5.9)
SODIUM SERPL-SCNC: 138 MMOL/L (ref 135–145)
WBC # BLD AUTO: 7.9 10E3/UL (ref 4–11)

## 2023-10-05 PROCEDURE — 99214 OFFICE O/P EST MOD 30 MIN: CPT | Performed by: FAMILY MEDICINE

## 2023-10-05 PROCEDURE — 82310 ASSAY OF CALCIUM: CPT | Mod: ZL | Performed by: FAMILY MEDICINE

## 2023-10-05 PROCEDURE — 85025 COMPLETE CBC W/AUTO DIFF WBC: CPT | Mod: ZL | Performed by: FAMILY MEDICINE

## 2023-10-05 PROCEDURE — 93010 ELECTROCARDIOGRAM REPORT: CPT | Performed by: INTERNAL MEDICINE

## 2023-10-05 PROCEDURE — G0463 HOSPITAL OUTPT CLINIC VISIT: HCPCS | Mod: 25

## 2023-10-05 PROCEDURE — 93005 ELECTROCARDIOGRAM TRACING: CPT | Performed by: FAMILY MEDICINE

## 2023-10-05 PROCEDURE — G0463 HOSPITAL OUTPT CLINIC VISIT: HCPCS

## 2023-10-05 PROCEDURE — 36415 COLL VENOUS BLD VENIPUNCTURE: CPT | Mod: ZL | Performed by: FAMILY MEDICINE

## 2023-10-05 ASSESSMENT — PAIN SCALES - GENERAL: PAINLEVEL: SEVERE PAIN (7)

## 2023-10-05 NOTE — PROGRESS NOTES
[facility] [place]  [address]  [place] [address]  Phone: [phone]  Fax: [phone]  Primary Provider: Leah Oscar  Pre-op Performing Provider: JUANA CARDENAS      PREOPERATIVE EVALUATION:  Today's date: 10/5/2023    Terry is a 63 year old male who presents for a preoperative evaluation.      Surgical Information:  Surgery/Procedure: LEFT  knee   Surgery Location: [facility]   Surgeon:    Surgery Date: 10/09/2023  Time of Surgery: TBD  Where patient plans to recover: At home with family  Fax number for surgical facility: [phone]    Assessment & Plan     The proposed surgical procedure is considered INTERMEDIATE risk.    Preop general physical exam  Blood pressure is slightly elevated but still acceptable for surgery.  - EKG 12-lead, tracing only (Same Day)  - CBC and Differential; Future  - Basic Metabolic Panel; Future  - CBC and Differential  - Basic Metabolic Panel    Primary osteoarthritis of left knee              - No identified additional risk factors other than previously addressed    Antiplatelet or Anticoagulation Medication Instructions:   - Patient is on no antiplatelet or anticoagulation medications.    Additional Medication Instructions:  Hold morning meds    RECOMMENDATION:  APPROVAL GIVEN to proceed with proposed procedure, without further diagnostic evaluation.            Subjective       HPI related to upcoming procedure: Patient arrives here for preop.  He will be undergoing partial knee replacement.  He reports 2-1/2 years of pain.  He reports affecting his gait.  Started from his sleep.  At times his knee locks up.  Been diagnosed with osteoarthritis      10/5/2023     4:15 PM   Preop Questions   1. Have you ever had a heart attack or stroke? No   2. Have you ever had surgery on your heart or blood vessels, such as a stent placement, a coronary artery bypass, or surgery on an artery in your head, neck, heart, or legs? No   3. Do you have  chest pain with activity? No   4. Do you have a history of  heart failure? No   5. Do you currently have a cold, bronchitis or symptoms of other infection? No   6. Do you have a cough, shortness of breath, or wheezing? No   7. Do you or anyone in your family have previous history of blood clots? No   8. Do you or does anyone in your family have a serious bleeding problem such as prolonged bleeding following surgeries or cuts? No   9. Have you ever had problems with anemia or been told to take iron pills? No   10. Have you had any abnormal blood loss such as black, tarry or bloody stools? No   11. Have you ever had a blood transfusion? No   12. Are you willing to have a blood transfusion if it is medically needed before, during, or after your surgery? Yes   13. Have you or any of your relatives ever had problems with anesthesia? No   14. Do you have sleep apnea, excessive snoring or daytime drowsiness? No   15. Do you have any artifical heart valves or other implanted medical devices like a pacemaker, defibrillator, or continuous glucose monitor? No   16. Do you have artificial joints? No   17. Are you allergic to latex? No       Health Care Directive:  Patient does not have a Health Care Directive or Living Will: Discussed advance care planning with patient; however, patient declined at this time.    Preoperative Review of :   reviewed - no record of controlled substances prescribed.          Review of Systems  CONSTITUTIONAL: NEGATIVE for fever, chills, change in weight  INTEGUMENTARY/SKIN: NEGATIVE for worrisome rashes, moles or lesions  EYES: NEGATIVE for vision changes or irritation  ENT/MOUTH: NEGATIVE for ear, mouth and throat problems  RESP: NEGATIVE for significant cough or SOB  CV: NEGATIVE for chest pain, palpitations or peripheral edema  GI: NEGATIVE for nausea, abdominal pain, heartburn, or change in bowel habits  : NEGATIVE for frequency, dysuria, or hematuria  MUSCULOSKELETAL: NEGATIVE for  significant arthralgias or myalgia  NEURO: NEGATIVE for weakness, dizziness or paresthesias  ENDOCRINE: NEGATIVE for temperature intolerance, skin/hair changes  HEME: NEGATIVE for bleeding problems  PSYCHIATRIC: NEGATIVE for changes in mood or affect  Patient does report numbness in his fingers    Patient Active Problem List    Diagnosis Date Noted    Anxiety 02/07/2020     Priority: Medium    Gastroesophageal reflux disease without esophagitis 02/07/2020     Priority: Medium    Old peripheral tear of medial meniscus of left knee 10/14/2019     Priority: Medium    Hyperlipemia 06/11/2013     Priority: Medium    Synovial cyst 06/11/2013     Priority: Medium      Past Medical History:   Diagnosis Date    Hyperlipidemia     6/11/2013    Male erectile dysfunction     6/11/2013     Past Surgical History:   Procedure Laterality Date    ARTHROSCOPY SHOULDER      rt shoulder x two    COLONOSCOPY      2009,normal    EXCISE CYST GENERIC (LOCATION)      7/8/13,mucous cyst excision, right index finger. Dr. Bajwa    KNEE SURGERY      arthroscopy      OTHER SURGICAL HISTORY      7/8/13,55279.0,DC EXC SKIN MALIG < 0.5 CM FACE EARS EYELIDS,Serleth, left eye     Current Outpatient Medications   Medication Sig Dispense Refill    atorvastatin (LIPITOR) 40 MG tablet Take 1 tablet (40 mg) by mouth daily 90 tablet 3    sertraline (ZOLOFT) 100 MG tablet Take 1 tablet (100 mg) by mouth daily 90 tablet 3       No Known Allergies     Social History     Tobacco Use    Smoking status: Never    Smokeless tobacco: Never   Substance Use Topics    Alcohol use: Not Currently     Alcohol/week: 0.0 standard drinks of alcohol     Family History   Problem Relation Age of Onset    Coronary Artery Disease Mother     Lung Cancer Father         smoker    No Known Problems Sister     No Known Problems Brother     Colitis Half-Brother         colostomy bag     History   Drug Use Unknown     Comment: Drug use: No         Objective     BP (!) 150/88   Pulse  "90   Temp 98  F (36.7  C)   Resp 20   Ht 1.702 m (5' 7\")   Wt 83.6 kg (184 lb 3.2 oz)   SpO2 99%   BMI 28.85 kg/m      Physical Exam    GENERAL APPEARANCE: healthy, alert and no distress     EYES: EOMI,  PERRL     HENT: ear canals and TM's normal and nose and mouth without ulcers or lesions     NECK: no adenopathy, no asymmetry, masses, or scars and thyroid normal to palpation     RESP: lungs clear to auscultation - no rales, rhonchi or wheezes     CV: regular rates and rhythm, normal S1 S2, no S3 or S4 and no murmur, click or rub     ABDOMEN:  soft, nontender, no HSM or masses and bowel sounds normal     MS: extremities normal- no gross deformities noted, no evidence of inflammation in joints, FROM in all extremities.     SKIN: no suspicious lesions or rashes     NEURO: Normal strength and tone,      PSYCH: mentation appears normal. and affect normal/bright     LYMPHATICS: No cervical adenopathy    Recent Labs   Lab Test 12/21/22  1144      POTASSIUM 5.0   CR 1.03   A1C 5.9        Diagnostics:  Recent Results (from the past 24 hour(s))   EKG 12-lead, tracing only (Same Day)    Collection Time: 10/05/23  4:32 PM   Result Value Ref Range    Systolic Blood Pressure  mmHg    Diastolic Blood Pressure  mmHg    Ventricular Rate 74 BPM    Atrial Rate 74 BPM    DC Interval 172 ms    QRS Duration 92 ms     ms    QTc 435 ms    P Axis 60 degrees    R AXIS -16 degrees    T Axis 28 degrees    Interpretation ECG       Sinus rhythm  Normal ECG  No previous ECGs available     Basic Metabolic Panel    Collection Time: 10/05/23  4:43 PM   Result Value Ref Range    Sodium 138 135 - 145 mmol/L    Potassium 4.5 3.4 - 5.3 mmol/L    Chloride 101 98 - 107 mmol/L    Carbon Dioxide (CO2) 28 22 - 29 mmol/L    Anion Gap 9 7 - 15 mmol/L    Urea Nitrogen 23.1 (H) 8.0 - 23.0 mg/dL    Creatinine 1.44 (H) 0.67 - 1.17 mg/dL    GFR Estimate 54 (L) >60 mL/min/1.73m2    Calcium 10.4 (H) 8.8 - 10.2 mg/dL    Glucose 95 70 - 99 mg/dL "   CBC with platelets and differential    Collection Time: 10/05/23  4:43 PM   Result Value Ref Range    WBC Count 7.9 4.0 - 11.0 10e3/uL    RBC Count 4.89 4.40 - 5.90 10e6/uL    Hemoglobin 14.5 13.3 - 17.7 g/dL    Hematocrit 43.0 40.0 - 53.0 %    MCV 88 78 - 100 fL    MCH 29.7 26.5 - 33.0 pg    MCHC 33.7 31.5 - 36.5 g/dL    RDW 12.9 10.0 - 15.0 %    Platelet Count 257 150 - 450 10e3/uL    % Neutrophils 68 %    % Lymphocytes 20 %    % Monocytes 8 %    Mids % (Monos, Eos, Basos)      % Eosinophils 3 %    % Basophils 1 %    % Immature Granulocytes 0 %    NRBCs per 100 WBC 0 <1 /100    Absolute Neutrophils 5.4 1.6 - 8.3 10e3/uL    Absolute Lymphocytes 1.6 0.8 - 5.3 10e3/uL    Absolute Monocytes 0.6 0.0 - 1.3 10e3/uL    Mids Abs (Monos, Eos, Basos)      Absolute Eosinophils 0.2 0.0 - 0.7 10e3/uL    Absolute Basophils 0.0 0.0 - 0.2 10e3/uL    Absolute Immature Granulocytes 0.0 <=0.4 10e3/uL    Absolute NRBCs 0.0 10e3/uL      EKG: appears normal, NSR, normal axis, normal intervals, no acute ST/T changes c/w ischemia, no LVH by voltage criteria    Revised Cardiac Risk Index (RCRI):  The patient has the following serious cardiovascular risks for perioperative complications:   - No serious cardiac risks = 0 points     RCRI Interpretation: 0 points: Class I (very low risk - 0.4% complication rate)         Signed Electronically by: Rafi Corral MD  Copy of this evaluation report is provided to requesting physician.

## 2023-10-08 LAB
ATRIAL RATE - MUSE: 74 BPM
DIASTOLIC BLOOD PRESSURE - MUSE: NORMAL MMHG
INTERPRETATION ECG - MUSE: NORMAL
P AXIS - MUSE: 60 DEGREES
PR INTERVAL - MUSE: 172 MS
QRS DURATION - MUSE: 92 MS
QT - MUSE: 392 MS
QTC - MUSE: 435 MS
R AXIS - MUSE: -16 DEGREES
SYSTOLIC BLOOD PRESSURE - MUSE: NORMAL MMHG
T AXIS - MUSE: 28 DEGREES
VENTRICULAR RATE- MUSE: 74 BPM

## 2023-10-12 ENCOUNTER — THERAPY VISIT (OUTPATIENT)
Dept: PHYSICAL THERAPY | Facility: OTHER | Age: 63
End: 2023-10-12
Attending: ORTHOPAEDIC SURGERY
Payer: MEDICARE

## 2023-10-12 DIAGNOSIS — M17.12 PRIMARY OSTEOARTHRITIS OF LEFT KNEE: ICD-10-CM

## 2023-10-12 PROCEDURE — 97110 THERAPEUTIC EXERCISES: CPT | Mod: GP

## 2023-10-12 PROCEDURE — 97016 VASOPNEUMATIC DEVICE THERAPY: CPT | Mod: GP

## 2023-10-12 PROCEDURE — 97161 PT EVAL LOW COMPLEX 20 MIN: CPT | Mod: GP

## 2023-10-12 NOTE — PROGRESS NOTES
PHYSICAL THERAPY EVALUATION  Type of Visit: Evaluation    See electronic medical record for Abuse and Falls Screening details.    Subjective       Presenting condition or subjective complaint: pt is a 63 year old male referred to skilled PT services following a s/p parial L knee replacement on monday. Pt reports recovery is going very well with minimal knee pain but thigh pain from tourniquet hurts the most. pt educated on icing and how to properly elevate even is pain is not bad. pt reports using FWW and keeping up with pain managment per MD prescription. pt also continues to use ace wrapping. pt happy with progress so far. 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  Date of onset: 10/09/23    Relevant medical history:     Past Medical History:   Diagnosis Date    Hyperlipidemia     6/11/2013    Male erectile dysfunction     6/11/2013        Prior therapy history for the same diagnosis, illness or injury: No      Prior Level of Function  Transfers: Independent  Ambulation: Independent  ADL: Independent  IADL: Driving, Finances, Housekeeping, Laundry, Meal preparation, Medication management, Work, Yard work    Living Environment  Social support: With a significant other or spouse   Type of home: House   Stairs to enter the home: No   Is there a railing: No   Ramp: No   Stairs inside the home: No   Is there a railing: No   Help at home: None  Equipment owned: Walker     Employment: Yes artist  Hobbies/Interests: making sculpures, staying active outdoors.    Patient goals for therapy: walk without an AD    Pain assessment: See objective evaluation for additional pain details     Objective   KNEE EVALUATION  PAIN: Pain Level at Rest: 2/10  Pain Level with Use: 8/10  Pain Location: knee  Pain Quality: Burning  Pain is Exacerbated By: certain positions  Pain is Relieved By: walking, rest, cold, changing positions, using the walker  INTEGUMENTARY (edema, incisions):  pt wearing ace bandage  POSTURE:  WFL  GAIT:  Weightbearing Status: WBAT  Assistive Device(s): Walker (front wheeled)  Gait Deviations: Antalgic  ROM:  L knee 90 deg flexion and lack of 10 deg ext  STRENGTH:  L knee antigravity strength, R knee grossly 4+/5 at hip and knee  PALPATION:  high tissue tension in quad and IT band  JOINT MOBILITY:  not assessed due to bandages    Assessment & Plan   CLINICAL IMPRESSIONS  Medical Diagnosis: s/p L knee arthoscopy, osteoarthritis of L knee    Treatment Diagnosis: gait instability, s/p partial L knee replacement   Impression/Assessment: Patient is a 63 year old male with s/p L partial knee replacement complaints.  The following significant findings have been identified: Pain, Decreased ROM/flexibility, Decreased joint mobility, Decreased strength, Impaired balance, and Impaired gait. These impairments interfere with their ability to perform self care tasks, work tasks, recreational activities, household chores, driving , household mobility, and community mobility as compared to previous level of function.     Clinical Decision Making (Complexity):  Clinical Presentation: Stable/Uncomplicated  Clinical Presentation Rationale: based on medical and personal factors listed in PT evaluation  Clinical Decision Making (Complexity): Low complexity    PLAN OF CARE  Treatment Interventions:  Modalities: Cryotherapy, E-stim, Hot Pack, Ultrasound, Vasoneumatic Device  Interventions: Gait Training, Manual Therapy, Neuromuscular Re-education, Therapeutic Activity, Therapeutic Exercise, Aquatic Therapy    Long Term Goals     PT Goal 1  Goal Identifier: pain  Goal Description: pt will report a 2/10 pain or less with gait of 100 feet without an AD in order to progress towards normal walking mechanics.  Target Date: 12/07/23  PT Goal 2  Goal Identifier: ROM  Goal Description: Pt will demonstrate 115 deg of AROM knee flexion to increase ROM needed for normalized descent on stairs.  Target Date: 11/23/23  PT Goal 3  Goal  Identifier: gait  Goal Description: Pt will ambulate for 200 feet with no AD, equal stance time and B foot clearance to progress to normal walking pattern and increase pt safety.  Target Date: 01/04/24  PT Goal 4  Goal Identifier: Balance  Goal Description: Pt will demonstrate ability to complete SLS for 15 sec on the L LE without UE assist to increase stability for normal walking pattern outdoors.  Target Date: 12/21/23      Frequency of Treatment: 2x/week  Duration of Treatment: 12 weeks    Education Assessment:   Learner/Method: Patient    Risks and benefits of evaluation/treatment have been explained.   Patient/Family/caregiver agrees with Plan of Care.     Evaluation Time:     PT Eval, Low Complexity Minutes (85820): 15     Signing Clinician: Jojo Box DPT      [facility]                                                                                   OUTPATIENT PHYSICAL THERAPY      PLAN OF TREATMENT FOR OUTPATIENT REHABILITATION   Patient's Last Name, First Name, Terry Vasquez YOB: 1960   Provider's Name   [facility]   Medical Record No.  [id number]     Onset Date: 10/09/23  Start of Care Date: 10/12/23     Medical Diagnosis:  s/p L knee arthoscopy, osteoarthritis of L knee      PT Treatment Diagnosis:  gait instability, s/p partial L knee replacement Plan of Treatment  Frequency/Duration: 2x/week/ 12 weeks    Certification date from 10/12/23 to 01/04/24         See note for plan of treatment details and functional goals     Jojo Box DPT                         I CERTIFY THE NEED FOR THESE SERVICES FURNISHED UNDER        THIS PLAN OF TREATMENT AND WHILE UNDER MY CARE .             Physician Signature               Date    X_____________________________________________________                    Referring Provider:  Jessie Sutton      Initial Assessment  See Epic Evaluation- Start of Care Date:  10/12/23

## 2023-10-16 ENCOUNTER — THERAPY VISIT (OUTPATIENT)
Dept: PHYSICAL THERAPY | Facility: OTHER | Age: 63
End: 2023-10-16
Attending: ORTHOPAEDIC SURGERY
Payer: MEDICARE

## 2023-10-16 DIAGNOSIS — M17.12 PRIMARY OSTEOARTHRITIS OF LEFT KNEE: Primary | ICD-10-CM

## 2023-10-16 PROCEDURE — 97140 MANUAL THERAPY 1/> REGIONS: CPT | Mod: GP

## 2023-10-16 PROCEDURE — 97110 THERAPEUTIC EXERCISES: CPT | Mod: GP

## 2023-10-18 ENCOUNTER — THERAPY VISIT (OUTPATIENT)
Dept: PHYSICAL THERAPY | Facility: OTHER | Age: 63
End: 2023-10-18
Attending: ORTHOPAEDIC SURGERY
Payer: MEDICARE

## 2023-10-18 DIAGNOSIS — M17.12 PRIMARY OSTEOARTHRITIS OF LEFT KNEE: Primary | ICD-10-CM

## 2023-10-18 PROCEDURE — 97110 THERAPEUTIC EXERCISES: CPT | Mod: GP

## 2023-10-18 PROCEDURE — 97140 MANUAL THERAPY 1/> REGIONS: CPT | Mod: GP

## 2023-10-25 ENCOUNTER — THERAPY VISIT (OUTPATIENT)
Dept: PHYSICAL THERAPY | Facility: OTHER | Age: 63
End: 2023-10-25
Attending: ORTHOPAEDIC SURGERY
Payer: MEDICARE

## 2023-10-25 DIAGNOSIS — M17.12 PRIMARY OSTEOARTHRITIS OF LEFT KNEE: Primary | ICD-10-CM

## 2023-10-25 PROCEDURE — 97110 THERAPEUTIC EXERCISES: CPT | Mod: GP

## 2023-10-25 PROCEDURE — 97140 MANUAL THERAPY 1/> REGIONS: CPT | Mod: GP

## 2023-10-30 ENCOUNTER — THERAPY VISIT (OUTPATIENT)
Dept: PHYSICAL THERAPY | Facility: OTHER | Age: 63
End: 2023-10-30
Attending: ORTHOPAEDIC SURGERY
Payer: MEDICARE

## 2023-10-30 DIAGNOSIS — M17.12 PRIMARY OSTEOARTHRITIS OF LEFT KNEE: Primary | ICD-10-CM

## 2023-10-30 PROCEDURE — 97140 MANUAL THERAPY 1/> REGIONS: CPT | Mod: GP

## 2023-10-30 PROCEDURE — 97110 THERAPEUTIC EXERCISES: CPT | Mod: GP

## 2023-11-01 ENCOUNTER — THERAPY VISIT (OUTPATIENT)
Dept: PHYSICAL THERAPY | Facility: OTHER | Age: 63
End: 2023-11-01
Attending: ORTHOPAEDIC SURGERY
Payer: MEDICARE

## 2023-11-01 DIAGNOSIS — M17.12 PRIMARY OSTEOARTHRITIS OF LEFT KNEE: Primary | ICD-10-CM

## 2023-11-01 PROCEDURE — 97140 MANUAL THERAPY 1/> REGIONS: CPT | Mod: GP

## 2023-11-01 PROCEDURE — 97110 THERAPEUTIC EXERCISES: CPT | Mod: GP

## 2023-11-02 ENCOUNTER — MYC MEDICAL ADVICE (OUTPATIENT)
Dept: FAMILY MEDICINE | Facility: OTHER | Age: 63
End: 2023-11-02
Payer: COMMERCIAL

## 2023-11-02 DIAGNOSIS — R05.2 SUBACUTE COUGH: Primary | ICD-10-CM

## 2023-11-03 RX ORDER — GUAIFENESIN 600 MG/1
1200 TABLET, EXTENDED RELEASE ORAL 2 TIMES DAILY
Qty: 60 TABLET | Refills: 1 | Status: SHIPPED | OUTPATIENT
Start: 2023-11-03 | End: 2024-02-16

## 2023-11-03 RX ORDER — BENZONATATE 100 MG/1
100 CAPSULE ORAL 3 TIMES DAILY PRN
Qty: 60 CAPSULE | Refills: 1 | Status: SHIPPED | OUTPATIENT
Start: 2023-11-03 | End: 2024-02-16

## 2023-11-06 ENCOUNTER — THERAPY VISIT (OUTPATIENT)
Dept: PHYSICAL THERAPY | Facility: OTHER | Age: 63
End: 2023-11-06
Attending: ORTHOPAEDIC SURGERY
Payer: MEDICARE

## 2023-11-06 DIAGNOSIS — M17.12 PRIMARY OSTEOARTHRITIS OF LEFT KNEE: Primary | ICD-10-CM

## 2023-11-06 PROCEDURE — 97110 THERAPEUTIC EXERCISES: CPT | Mod: GP,CQ

## 2023-11-06 PROCEDURE — 97140 MANUAL THERAPY 1/> REGIONS: CPT | Mod: GP,CQ

## 2023-11-13 ENCOUNTER — THERAPY VISIT (OUTPATIENT)
Dept: PHYSICAL THERAPY | Facility: OTHER | Age: 63
End: 2023-11-13
Attending: ORTHOPAEDIC SURGERY
Payer: MEDICARE

## 2023-11-13 DIAGNOSIS — M17.12 PRIMARY OSTEOARTHRITIS OF LEFT KNEE: Primary | ICD-10-CM

## 2023-11-13 PROCEDURE — 97110 THERAPEUTIC EXERCISES: CPT | Mod: GP,CQ

## 2023-11-22 ENCOUNTER — THERAPY VISIT (OUTPATIENT)
Dept: PHYSICAL THERAPY | Facility: OTHER | Age: 63
End: 2023-11-22
Attending: ORTHOPAEDIC SURGERY
Payer: MEDICARE

## 2023-11-22 DIAGNOSIS — M17.12 PRIMARY OSTEOARTHRITIS OF LEFT KNEE: Primary | ICD-10-CM

## 2023-11-22 PROCEDURE — 97110 THERAPEUTIC EXERCISES: CPT | Mod: GP,CQ

## 2023-11-22 PROCEDURE — 97140 MANUAL THERAPY 1/> REGIONS: CPT | Mod: GP,CQ

## 2023-11-27 DIAGNOSIS — E78.00 PURE HYPERCHOLESTEROLEMIA: ICD-10-CM

## 2023-11-28 RX ORDER — ATORVASTATIN CALCIUM 40 MG/1
40 TABLET, FILM COATED ORAL DAILY
Qty: 90 TABLET | Refills: 2 | Status: SHIPPED | OUTPATIENT
Start: 2023-11-28 | End: 2024-02-16

## 2023-11-28 NOTE — TELEPHONE ENCOUNTER
[id number] sent Rx request for the following:    Patient enrolled in our Rx Med Sync service to improve adherence. We are requesting a refill authorization in advance to ensure an active prescription is on file.     Requested Prescriptions   Pending Prescriptions Disp Refills    atorvastatin (LIPITOR) 40 MG tablet [Pharmacy Med Name: ATORVASTATIN 40MG TABLET] 90 tablet 0     Sig: TAKE 1 TABLET BY MOUTH DAILY   Last Prescription Date:   12/21/22  Last Fill Qty/Refills:         90, R-3    Last Office Visit:              10/5/23   Future Office visit:           None    Prescription approved per [facility] Refill Protocol.    Jillian Osullivan RN .............. 11/28/2023  9:09 AM

## 2023-11-30 ENCOUNTER — THERAPY VISIT (OUTPATIENT)
Dept: PHYSICAL THERAPY | Facility: OTHER | Age: 63
End: 2023-11-30
Attending: ORTHOPAEDIC SURGERY
Payer: MEDICARE

## 2023-11-30 DIAGNOSIS — M17.12 PRIMARY OSTEOARTHRITIS OF LEFT KNEE: Primary | ICD-10-CM

## 2023-11-30 PROCEDURE — 97110 THERAPEUTIC EXERCISES: CPT | Mod: GP,CQ

## 2023-12-01 NOTE — PROGRESS NOTES
11/30/23 0500   Appointment Info   Signing clinician's name / credentials Deanna Patrick PTA   Total/Authorized Visits 10   Visits Used 10 of 10   Medical Diagnosis s/p L knee arthoscopy, osteoarthritis of L knee   PT Tx Diagnosis gait instability, s/p partial L knee replacement   Quick Adds Certification   Progress Note/Certification   Start of Care Date 10/12/23   Onset of illness/injury or Date of Surgery 10/09/23   Therapy Frequency 2x/week   Predicted Duration 12 weeks   Certification date from 10/12/23   Certification date to 01/04/24   Progress Note Completed Date 10/12/23   Supervision   PT Assistant Visit Number 4   GOALS   PT Goals 2;3;4   PT Goal 1   Goal Identifier pain   Goal Description pt will report a 2/10 pain or less with gait of 100 feet without an AD in order to progress towards normal walking mechanics.   Goal Progress MET   Target Date 12/07/23   Date Met 11/30/23   PT Goal 2   Goal Identifier ROM   Goal Description Pt will demonstrate 115 deg of AROM knee flexion to increase ROM needed for normalized descent on stairs.   Goal Progress MET   Target Date 11/23/23   Date Met 11/30/23   PT Goal 3   Goal Identifier gait   Goal Description Pt will ambulate for 200 feet with no AD, equal stance time and B foot clearance to progress to normal walking pattern and increase pt safety.   Goal Progress MET   Target Date 01/04/24   Date Met 11/30/23   PT Goal 4   Goal Identifier Balance   Goal Description Pt will demonstrate ability to complete SLS for 15 sec on the L LE without UE assist to increase stability for normal walking pattern outdoors.   Goal Progress MET   Target Date 12/21/23   Date Met 11/30/23   Subjective Report   Subjective Report Terry reports he is ready for today to be his last day. Plans to transition to the [facility]. Reports buttocks hasn't been as sore, doing really well with balance - walked on ice and didn't slip.   Objective Measures   Objective Measures Objective Measure 1;Objective  Measure 2   Objective Measure 1   Objective Measure pain   Objective Measure 2   Objective Measure L knee ROM   Details 0-122 deg   PT Modalities   PT Modalities Vasopneumatic device   Vasopneumatic Device   Treatment Detail GameReady/NICE for 10 min on low compression and level 4 cold to L knee in supine with leg elevated on bolster   Patient Response/Progress decrease pain following   Treatment Interventions (PT)   Interventions Therapeutic Procedure/Exercise;Manual Therapy   Therapeutic Procedure/Exercise   Therapeutic Procedures: strength, endurance, ROM, flexibillity minutes (05923) 40   Ther Proc 1 muscle activation   Ther Proc 1 - Details Stationary bike L4 x5 minutes - full revolutions immediately. Walking lunges with good form and balance. [employer] machines x20 each: leg press 140# and L LE 80#, knee flexion 80# - added HS stretch x60 second hold, knee ext 60#, hip ABD 50#. Discussed that he is able to return to the [facility] but to start with light weights on the machines, monitor form and pain. Passive quad stretch 3 x30 second hold.   Skilled Intervention education parameter education on icing and HEP   Patient Response/Progress good tolerance.   Manual Therapy   Manual Therapy 1 PROM   Manual Therapy 1 - Details STM to L lateral quad x7 minutes. Scar mobs. Patellar mobs.   Skilled Intervention education   Patient Response/Progress good tolerance   Education   Learner/Method Patient   Plan   Home program [id number], [id number], [id number]   Plan for next session discharge   Total Session Time   Timed Code Treatment Minutes 40   Total Treatment Time (sum of timed and untimed services) 40         DISCHARGE  Reason for Discharge: Patient has met all goals.    Equipment Issued: HEP    Discharge Plan: Patient to continue home program.    Referring Provider:  Jessie Sutton

## 2024-01-27 ENCOUNTER — MYC MEDICAL ADVICE (OUTPATIENT)
Dept: FAMILY MEDICINE | Facility: OTHER | Age: 64
End: 2024-01-27
Payer: COMMERCIAL

## 2024-02-16 ENCOUNTER — OFFICE VISIT (OUTPATIENT)
Dept: FAMILY MEDICINE | Facility: OTHER | Age: 64
End: 2024-02-16
Attending: FAMILY MEDICINE
Payer: COMMERCIAL

## 2024-02-16 VITALS
TEMPERATURE: 97.5 F | RESPIRATION RATE: 16 BRPM | WEIGHT: 187.2 LBS | DIASTOLIC BLOOD PRESSURE: 84 MMHG | BODY MASS INDEX: 29.32 KG/M2 | SYSTOLIC BLOOD PRESSURE: 138 MMHG | HEART RATE: 76 BPM | OXYGEN SATURATION: 97 %

## 2024-02-16 DIAGNOSIS — R73.01 IFG (IMPAIRED FASTING GLUCOSE): ICD-10-CM

## 2024-02-16 DIAGNOSIS — D64.9 ANEMIA, UNSPECIFIED TYPE: ICD-10-CM

## 2024-02-16 DIAGNOSIS — E78.00 PURE HYPERCHOLESTEROLEMIA: ICD-10-CM

## 2024-02-16 DIAGNOSIS — Z12.5 SCREENING FOR PROSTATE CANCER: Primary | ICD-10-CM

## 2024-02-16 LAB
ANION GAP SERPL CALCULATED.3IONS-SCNC: 9 MMOL/L (ref 7–15)
BUN SERPL-MCNC: 17.4 MG/DL (ref 8–23)
CALCIUM SERPL-MCNC: 10.5 MG/DL (ref 8.8–10.2)
CHLORIDE SERPL-SCNC: 100 MMOL/L (ref 98–107)
CHOLEST SERPL-MCNC: 170 MG/DL
CREAT SERPL-MCNC: 1.1 MG/DL (ref 0.67–1.17)
DEPRECATED HCO3 PLAS-SCNC: 28 MMOL/L (ref 22–29)
EGFRCR SERPLBLD CKD-EPI 2021: 74 ML/MIN/1.73M2
FASTING STATUS PATIENT QL REPORTED: ABNORMAL
GLUCOSE SERPL-MCNC: 86 MG/DL (ref 70–99)
HBA1C MFR BLD: 6 % (ref 4–6.2)
HDLC SERPL-MCNC: 50 MG/DL
HGB BLD-MCNC: 14.5 G/DL (ref 13.3–17.7)
LDLC SERPL CALC-MCNC: 101 MG/DL
NONHDLC SERPL-MCNC: 120 MG/DL
POTASSIUM SERPL-SCNC: 4.8 MMOL/L (ref 3.4–5.3)
PSA SERPL DL<=0.01 NG/ML-MCNC: 2.51 NG/ML (ref 0–4.5)
SODIUM SERPL-SCNC: 137 MMOL/L (ref 135–145)
TRIGL SERPL-MCNC: 97 MG/DL

## 2024-02-16 PROCEDURE — 83036 HEMOGLOBIN GLYCOSYLATED A1C: CPT | Mod: ZL | Performed by: FAMILY MEDICINE

## 2024-02-16 PROCEDURE — 80048 BASIC METABOLIC PNL TOTAL CA: CPT | Mod: ZL | Performed by: FAMILY MEDICINE

## 2024-02-16 PROCEDURE — 99214 OFFICE O/P EST MOD 30 MIN: CPT | Performed by: FAMILY MEDICINE

## 2024-02-16 PROCEDURE — G0463 HOSPITAL OUTPT CLINIC VISIT: HCPCS

## 2024-02-16 PROCEDURE — G0103 PSA SCREENING: HCPCS | Mod: ZL | Performed by: FAMILY MEDICINE

## 2024-02-16 PROCEDURE — 80061 LIPID PANEL: CPT | Mod: ZL | Performed by: FAMILY MEDICINE

## 2024-02-16 PROCEDURE — 85018 HEMOGLOBIN: CPT | Mod: ZL | Performed by: FAMILY MEDICINE

## 2024-02-16 PROCEDURE — 36415 COLL VENOUS BLD VENIPUNCTURE: CPT | Mod: ZL | Performed by: FAMILY MEDICINE

## 2024-02-16 RX ORDER — ATORVASTATIN CALCIUM 40 MG/1
40 TABLET, FILM COATED ORAL DAILY
Qty: 90 TABLET | Refills: 3 | Status: SHIPPED | OUTPATIENT
Start: 2024-02-16

## 2024-02-16 ASSESSMENT — PAIN SCALES - GENERAL: PAINLEVEL: NO PAIN (0)

## 2024-02-16 NOTE — NURSING NOTE
"Chief Complaint   Patient presents with    Labs Only     Here for have some lab work done. Check on Cholesterol.        Initial /84 (BP Location: Right arm, Patient Position: Sitting, Cuff Size: Adult Regular)   Pulse 76   Temp 97.5  F (36.4  C) (Tympanic)   Resp 16   Wt 84.9 kg (187 lb 3.2 oz)   SpO2 97%   BMI 29.32 kg/m   Estimated body mass index is 29.32 kg/m  as calculated from the following:    Height as of 10/5/23: 1.702 m (5' 7\").    Weight as of this encounter: 84.9 kg (187 lb 3.2 oz).  Medication Reconciliation: complete    Zhanna Frost LPN    Advance Care Directive reviewed    "

## 2024-02-16 NOTE — PROGRESS NOTES
"  Assessment & Plan       ICD-10-CM    1. Screening for prostate cancer  Z12.5 PSA Screen GH     PSA Screen GH      2. Pure hypercholesterolemia  E78.00 Basic Metabolic Panel     Lipid Panel     atorvastatin (LIPITOR) 40 MG tablet     Basic Metabolic Panel     Lipid Panel      3. IFG (impaired fasting glucose)  R73.01 Hemoglobin A1c     Hemoglobin A1c      4. Anemia, unspecified type  D64.9 Hemoglobin     Hemoglobin        Labs today.  Patient had anemia postop following his knee replacement, suspect this will be improvement with I did confirm with labs today.    Reviewed recommendations for prostate cancer screening.  Will obtain PSA today.    Reviewed several mildly elevated blood pressures.  Could justify initiating antihypertensives at this time.  Patient indicates that his blood pressure has been normal when he checks it about side of the clinic.  Will continue to monitor for now.    Patient has had weight gain, likely related to recent knee replacement and inactivity.  His activity will likely increase as he starts golfing more this spring.  He specifically asks about weekly injectable medications for weight loss.  Would not recommend that in the his situation.  He could consider metformin, as he does have evidence of impaired fasting glucose, this would likely help some with weight loss.  But at this point would strongly encourage lifestyle changes.        BMI  Estimated body mass index is 29.32 kg/m  as calculated from the following:    Height as of 10/5/23: 1.702 m (5' 7\").    Weight as of this encounter: 84.9 kg (187 lb 3.2 oz).       No follow-ups on file.    Stephane Stewart is a 63 year old, presenting for the following health issues:  Labs Only (Here for have some lab work done. Check on Cholesterol. )        2/16/2024    11:46 AM   Additional Questions   Roomed by Zhanna   Accompanied by self     History of Present Illness       Reason for visit:  Cholesterol weight meds    He eats 2-3 servings of " fruits and vegetables daily.He consumes 1 sweetened beverage(s) daily.He exercises with enough effort to increase his heart rate 9 or less minutes per day.  He exercises with enough effort to increase his heart rate 3 or less days per week.   He is taking medications regularly.     The 10-year ASCVD risk score (Christiano STAUFFER, et al., 2019) is: 12.9%    Values used to calculate the score:      Age: 63 years      Sex: Male      Is Non- : No      Diabetic: No      Tobacco smoker: No      Systolic Blood Pressure: 138 mmHg      Is BP treated: No      HDL Cholesterol: 50 mg/dL      Total Cholesterol: 170 mg/dL          Objective    /84 (BP Location: Right arm, Patient Position: Sitting, Cuff Size: Adult Regular)   Pulse 76   Temp 97.5  F (36.4  C) (Tympanic)   Resp 16   Wt 84.9 kg (187 lb 3.2 oz)   SpO2 97%   BMI 29.32 kg/m    Body mass index is 29.32 kg/m .  Physical Exam  Constitutional:       Appearance: He is well-developed.   HENT:      Right Ear: External ear normal.      Left Ear: External ear normal.   Eyes:      General: No scleral icterus.     Conjunctiva/sclera: Conjunctivae normal.   Cardiovascular:      Rate and Rhythm: Normal rate.   Pulmonary:      Effort: Pulmonary effort is normal. No respiratory distress.   Skin:     Findings: No rash.   Neurological:      Mental Status: He is alert.                 Signed Electronically by: Leah Oscar MD

## 2024-02-21 ENCOUNTER — MYC MEDICAL ADVICE (OUTPATIENT)
Dept: FAMILY MEDICINE | Facility: OTHER | Age: 64
End: 2024-02-21
Payer: COMMERCIAL

## 2024-02-21 DIAGNOSIS — R73.01 IFG (IMPAIRED FASTING GLUCOSE): Primary | ICD-10-CM

## 2024-02-21 RX ORDER — METFORMIN HCL 500 MG
500 TABLET, EXTENDED RELEASE 24 HR ORAL
Qty: 30 TABLET | Refills: 1 | Status: SHIPPED | OUTPATIENT
Start: 2024-02-21

## 2024-08-05 ENCOUNTER — TELEPHONE (OUTPATIENT)
Dept: FAMILY MEDICINE | Facility: OTHER | Age: 64
End: 2024-08-05
Payer: COMMERCIAL

## 2024-08-05 NOTE — TELEPHONE ENCOUNTER
Patient in medicare and states that he needs to know for insurance reasons what was injected  -     triamcinolone (KENALOG-40) injection 40 mg  -     lidocaine (PF) (XYLOCAINE) 1 % injection 2 mL  -     Bupivacaine 0.5% 2mL Intra-articular  No further questions

## 2024-08-05 NOTE — TELEPHONE ENCOUNTER
Patient called stating his insurance is needing some information regarding his injections/dosage and would like a call back.    Chely Bishop on 8/5/2024 at 11:55 AM

## 2024-08-22 DIAGNOSIS — F41.9 ANXIETY: ICD-10-CM

## 2024-08-26 RX ORDER — SERTRALINE HYDROCHLORIDE 100 MG/1
100 TABLET, FILM COATED ORAL DAILY
Qty: 90 TABLET | Refills: 3 | OUTPATIENT
Start: 2024-08-26

## 2024-08-26 NOTE — TELEPHONE ENCOUNTER
Spoke with patient, he takes 50 mg but states he has enough and does not needs a refill at this time.  Yary Lieberman RN on 8/26/2024 at 3:22 PM

## 2024-08-26 NOTE — TELEPHONE ENCOUNTER
Please check with the patient.  It looks like he may just be on 50 mg daily.  Does he want to continue to take half of 100 mg tablet.  Or should we switch to a 50 mg tablet?

## 2024-08-26 NOTE — TELEPHONE ENCOUNTER
[id number] sent Rx request for the following:      Requested Prescriptions   Pending Prescriptions Disp Refills    sertraline (ZOLOFT) 100 MG tablet [Pharmacy Med Name: SERTRALINE 100MG TABLET] 90 tablet 3     Sig: TAKE 1 TABLET (100 MG) BY MOUTH DAILY       SSRIs Protocol Failed - 8/26/2024  9:13 AM        Failed - KASEY-7 score of less than 5 in past 6 months.     Please review last KASEY-7 score.           Passed - Medication is active on med list        Passed - Recent (12 mo) or future (90 days) visit within the authorizing provider's specialty     The patient must have completed an in-person or virtual visit within the past 12 months or has a future visit scheduled within the next 90 days with the authorizing provider s specialty.  Urgent care and e-visits do not quality as an office visit for this protocol.          Passed - Medication indicated for associated diagnosis     Medication is associated with one or more of the following diagnoses:              Anxiety             Bipolar  Depression  Obsessive-compulsive disorder             Panic disorder  Postmenopausal flushing             Premenstrual dysphoric disorder             Social phobia   Adjustment disorder with depressed mood   Mood disorder          Passed - Patient is age 18 or older             Last Prescription Date:   9/11/23  Last Fill Qty/Refills:         90, R-3    Last Office Visit:              2/16/24  Future Office visit:           None  Next 5 appointments (look out 90 days)      Sep 04, 2024 9:00 AM  (Arrive by 8:45 AM)  Return Visit with Gerald Stevenson MD  [facility] ([facility] ) [address] [facility] [id number] [phone]     Unable to complete prescription refill per RN Medication Refill Policy.     Phu Sepulveda RN on 8/26/2024 at 9:18 AM

## 2024-09-04 ENCOUNTER — OFFICE VISIT (OUTPATIENT)
Dept: FAMILY MEDICINE | Facility: OTHER | Age: 64
End: 2024-09-04
Attending: FAMILY MEDICINE
Payer: MEDICARE

## 2024-09-04 ENCOUNTER — HOSPITAL ENCOUNTER (OUTPATIENT)
Dept: GENERAL RADIOLOGY | Facility: OTHER | Age: 64
Discharge: HOME | End: 2024-09-04
Attending: FAMILY MEDICINE
Payer: MEDICARE

## 2024-09-04 VITALS
HEART RATE: 76 BPM | SYSTOLIC BLOOD PRESSURE: 140 MMHG | WEIGHT: 182 LBS | BODY MASS INDEX: 28.51 KG/M2 | RESPIRATION RATE: 16 BRPM | DIASTOLIC BLOOD PRESSURE: 88 MMHG | TEMPERATURE: 97.2 F | OXYGEN SATURATION: 99 %

## 2024-09-04 DIAGNOSIS — M17.11 PRIMARY OSTEOARTHRITIS OF RIGHT KNEE: Primary | ICD-10-CM

## 2024-09-04 DIAGNOSIS — M95.8 OSTEOCHONDRAL DEFECT OF FEMORAL CONDYLE: ICD-10-CM

## 2024-09-04 PROCEDURE — G0463 HOSPITAL OUTPT CLINIC VISIT: HCPCS | Mod: 25

## 2024-09-04 PROCEDURE — 73560 X-RAY EXAM OF KNEE 1 OR 2: CPT | Mod: LT

## 2024-09-04 PROCEDURE — 250N000009 HC RX 250: Performed by: FAMILY MEDICINE

## 2024-09-04 PROCEDURE — 250N000011 HC RX IP 250 OP 636: Performed by: FAMILY MEDICINE

## 2024-09-04 PROCEDURE — 99214 OFFICE O/P EST MOD 30 MIN: CPT | Mod: 25 | Performed by: FAMILY MEDICINE

## 2024-09-04 PROCEDURE — 20610 DRAIN/INJ JOINT/BURSA W/O US: CPT | Performed by: FAMILY MEDICINE

## 2024-09-04 RX ORDER — BUPIVACAINE HYDROCHLORIDE 5 MG/ML
2 INJECTION, SOLUTION EPIDURAL; INTRACAUDAL ONCE
Status: COMPLETED | OUTPATIENT
Start: 2024-09-04 | End: 2024-09-04

## 2024-09-04 RX ORDER — LIDOCAINE HYDROCHLORIDE 10 MG/ML
2 INJECTION, SOLUTION EPIDURAL; INFILTRATION; INTRACAUDAL; PERINEURAL ONCE
Status: COMPLETED | OUTPATIENT
Start: 2024-09-04 | End: 2024-09-04

## 2024-09-04 RX ORDER — TRIAMCINOLONE ACETONIDE 40 MG/ML
40 INJECTION, SUSPENSION INTRA-ARTICULAR; INTRAMUSCULAR ONCE
Status: COMPLETED | OUTPATIENT
Start: 2024-09-04 | End: 2024-09-04

## 2024-09-04 RX ADMIN — LIDOCAINE HYDROCHLORIDE 2 ML: 10 INJECTION, SOLUTION INFILTRATION; PERINEURAL at 09:50

## 2024-09-04 RX ADMIN — BUPIVACAINE HYDROCHLORIDE 10 MG: 5 INJECTION, SOLUTION EPIDURAL; INTRACAUDAL; PERINEURAL at 09:49

## 2024-09-04 RX ADMIN — TRIAMCINOLONE ACETONIDE 40 MG: 40 INJECTION, SUSPENSION INTRA-ARTICULAR; INTRAMUSCULAR at 09:50

## 2024-09-04 ASSESSMENT — PAIN SCALES - GENERAL: PAINLEVEL: MILD PAIN (2)

## 2024-09-04 NOTE — PROGRESS NOTES
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  He has had pain for several years.  He was last seen in this office on 3/20/2023.  At that time he received bilateral knee injections.  Since then he has undergone a hemiarthroplasty of the left knee.  He rates his right knee pain at 6/10.  He characterizes the pain as sharp.  He does note that this pain affects his ability to play golf.  No new injuries.      EXAM:  BP (!) 140/88   Pulse 76   Temp 97.2  F (36.2  C) (Temporal)   Resp 16   Wt 82.6 kg (182 lb)   SpO2 99%   BMI 28.51 kg/m    MUSCULOSKELETAL EXAM:  RIGHT KNEE  -No gross deformity  - No bruising or soft tissue swelling  - Normal skin temperature without cyanosis  - Medial joint line pain      IMAGIN2021: 4 view left knee x-ray  -Moderate medial tibiofemoral compartment joint space narrowing, advanced since previous x-rays in 2019.  Chondrocalcinosis noted bilaterally.    2023: 5 view bilateral knee x-ray  - Moderate bilateral medial tibiofemoral compartment joint space narrowing.  There is a new osteochondral defect involving the right medial femoral condyle.    10/23/2023: 3 view left knee x-ray  - New hemiarthroplasty involving the medial femoral condyle of the left knee.  Osteochondral defect involving the medial femoral condyle of the right knee is again noted.      ASSESSMENT/PLAN:  Diagnoses and all orders for this visit:  Primary osteoarthritis of right knee  -     XR Knee Standing 2v  Bilateral & 2v Right  -     DRAIN/INJECT LARGE JOINT/BURSA  -     triamcinolone (KENALOG-40) injection 40 mg  -     lidocaine (PF) (XYLOCAINE) 1 % injection 2 mL  -     Bupivacaine 0.5% 2mL Intra-articular  Osteochondral defect of femoral condyle  -     XR Knee Standing 2v  Bilateral & 2v Right    64-year-old male with right knee osteoarthritis.  He also has a chronic osteochondral defect involving the medial femoral condyle which has been  present for over a year now.  We did discuss that this is likely contributing to his symptoms.  At this time it is unlikely there to be a role for specifically addressing the osteochondral defect as he would likely be a more suitable candidate for arthroplasty.  Treatment options were reviewed which include aerobic activity, physical therapy, ice, topical medications, oral medications, injections, and surgery.  After reviewing the risks/benefits, the patient elects to proceed with an intra-articular CSI.  See procedure note below:    PROCEDURE:  Intraarticular Injection of the Right Knee     PROCEDURAL PAUSE:    A procedural pause was conducted to verify:  correct patient identity, procedure to be performed, and as applicable, correct side, site, and correct patient position.      INFORMED CONSENT:   I discussed the risks, possible benefits, and alternatives to injection.  Following denial of allergy and review of potential side effects and complications (including but not necessarily limited to infection, allergic reaction, fat necrosis, skin depigmentation, local tissue breakdown, systemic effects of corticosteroids, elevation of blood glucose, injury to soft tissue and/or nerves, and seizure), all questions were answered, and consent was given to proceed.  Patient verbalized understanding.      PROCEDURE DETAILS:    Side and site were marked, and a time out was performed to verify appropriate patient identifiers.  Following this the right knee, just superior to the tibial plateau and medial to the patellar tendon, was prepped with chlorhexidine.  Utilizing a 22-gauge needle the right intraarticular knee was injected using a anteromedial to posterolateral approach with 2mL of 1% Lidocaine, 2mL of 0.5% bupivacaine, and 1mL triamcinolone (40mg/mL).  0mL was wasted.      The patient tolerated the procedure without complication and was discharged in good condition after a short observation period.  The patient was  instructed to contact me regarding any questions pertaining to the procedure.      DIAGNOSIS:    -Successful injection of the right intraarticular knee without immediate complication      PLAN:   -Post-procedure care reviewed, including avoiding submersion of the injection site for 48 hours   -Return precautions reviewed for signs/symptoms that would be concerning for infection   -The patient was instructed to ice and take APAP this evening if needed   -Return to clinic as needed  -If CSIs do not provide desired symptom relief at some time in the future, consider switching to viscosupplementation      Gerald Ortiz MD  9/4/2024  9:16 AM    Total time spent with this patient was 27 minutes which included chart review, visualization and independent interpretation of images, time spent with the patient, and documentation.    Procedure time:  3 minute(s)

## 2025-01-06 ENCOUNTER — MYC REFILL (OUTPATIENT)
Dept: FAMILY MEDICINE | Facility: OTHER | Age: 65
End: 2025-01-06
Payer: COMMERCIAL

## 2025-01-06 DIAGNOSIS — E78.00 PURE HYPERCHOLESTEROLEMIA: ICD-10-CM

## 2025-01-08 RX ORDER — ATORVASTATIN CALCIUM 40 MG/1
40 TABLET, FILM COATED ORAL DAILY
Qty: 90 TABLET | Refills: 0 | Status: SHIPPED | OUTPATIENT
Start: 2025-01-08

## 2025-01-08 NOTE — TELEPHONE ENCOUNTER
Requested Prescriptions   Pending Prescriptions Disp Refills    atorvastatin (LIPITOR) 40 MG tablet 90 tablet 3     Sig: Take 1 tablet (40 mg) by mouth daily.   Last Prescription Date:   2/16/24  Last Fill Qty/Refills:         90, R-3    Last Office Visit:              2/16/24   Future Office visit:           1/22/25  Prescription refilled per RN Medication Refill Policy.................... Jillian Osullivan RN ....................  1/8/2025   9:03 AM

## 2025-01-20 ENCOUNTER — MYC MEDICAL ADVICE (OUTPATIENT)
Dept: FAMILY MEDICINE | Facility: OTHER | Age: 65
End: 2025-01-20
Payer: COMMERCIAL

## 2025-01-20 NOTE — TELEPHONE ENCOUNTER
"Leah,    Patient is requesting \"help prescribing a weight loss drug for me\".      LOV with you states:  \"I would not recommend injectable weight loss meds in his situation\".     He is also just under the obesity BMI  (BMI:  28.8).  SO I can't image we would get this approved.       Should I ask him to schedule OV and contact his insurance to see if they are covering injectables for weight loss at this time?  Or is it not worth the appt?     ___________________________________________________________________    2/16/24 LOV with Tofte:    Patient has had weight gain, likely related to recent knee replacement and inactivity. His activity will likely increase as he starts golfing more this spring. He specifically asks about weekly injectable medications for weight loss. Would not recommend that in the his situation. He could consider metformin, as he does have evidence of impaired fasting glucose, this would likely help some with weight loss. But at this point would strongly encourage lifestyle changes.     Alberta Alarcon RN on 1/20/2025 at 3:52 PM    "

## 2025-01-21 NOTE — TELEPHONE ENCOUNTER
Yes, please tell patient he will need to schedule an appointment, and have him check with insurance regarding coverage.  He currently does not meet criteria for obesity, but should get an updated weight tomorrow at his Ortho appointment.  In my experience, even if insurance companies cover medication for weight loss, they will not cover them if somebody does not have a BMI over 30. Leah Oscar MD

## 2025-01-22 ENCOUNTER — OFFICE VISIT (OUTPATIENT)
Dept: FAMILY MEDICINE | Facility: OTHER | Age: 65
End: 2025-01-22
Attending: FAMILY MEDICINE
Payer: MEDICARE

## 2025-01-22 ENCOUNTER — HOSPITAL ENCOUNTER (OUTPATIENT)
Dept: GENERAL RADIOLOGY | Facility: OTHER | Age: 65
Discharge: HOME | End: 2025-01-22
Attending: FAMILY MEDICINE
Payer: MEDICARE

## 2025-01-22 VITALS
SYSTOLIC BLOOD PRESSURE: 126 MMHG | OXYGEN SATURATION: 98 % | BODY MASS INDEX: 29.44 KG/M2 | TEMPERATURE: 97.4 F | WEIGHT: 188 LBS | RESPIRATION RATE: 16 BRPM | DIASTOLIC BLOOD PRESSURE: 78 MMHG | HEART RATE: 99 BPM

## 2025-01-22 DIAGNOSIS — M18.12 ARTHRITIS OF CARPOMETACARPAL (CMC) JOINT OF LEFT THUMB: Primary | ICD-10-CM

## 2025-01-22 PROCEDURE — G0463 HOSPITAL OUTPT CLINIC VISIT: HCPCS

## 2025-01-22 PROCEDURE — 73140 X-RAY EXAM OF FINGER(S): CPT | Mod: LT

## 2025-01-22 ASSESSMENT — PAIN SCALES - GENERAL: PAINLEVEL_OUTOF10: MILD PAIN (3)

## 2025-01-22 NOTE — PROGRESS NOTES
Sports Medicine Office Note    HPI:  64-year-old male coming in for evaluation of left hand pain.  His pain started in 2024.  He was helping to move a grand piano and felt sudden pain at the base of his thumb.  This pain has been intermittent since that time.  Certain gripping/lifting motions will bring on the pain.  He comes in today for evaluation because he has had trouble with pain symptoms while swinging a golf club.  He rates his pain at 4/10 when it does occur.  He characterizes this pain as achy.      EXAM:  /78 (BP Location: Right arm, Patient Position: Sitting, Cuff Size: Adult Regular)   Pulse 99   Temp 97.4  F (36.3  C) (Temporal)   Resp 16   Wt 85.3 kg (188 lb)   SpO2 98%   BMI 29.44 kg/m    MUSCULOSKELETAL EXAM:  LEFT HAND  Inspection:  -No gross deformity  -No bruising or swelling  -Scars:  None    Tenderness to palpation of the:  -Ulnar styloid:  Negative  -Distal radius:  Negative  -Lusi's tubercle:  Negative  -Scapholunate ligament:  Negative  -Scaphoid in anatomical snuffbox: Mild pain  -1st CMC joint: Positive  -1st MCP joint:  Negative  -Metacarpals:  Negative    Range of Motion:  -Able to fully extend fingers  -Able to make full fist    Strength:  - strength:  5/5    Sensation:  -Intact to light touch in the radial, median, and ulnar nerve distributions    Motor:  -Intact AIN, PIN, and IO    Special Tests:  -1st CMC grind test: Weakly positive  -Valgus stress at 1st MCP:  Negative    Other:  -No signs of cyanosis. Normal skin temperature of the upper extremity.  -Elbow:  No gross deformity. Full range of motion.  -Right hand:  No gross deformity. No palpable tenderness. Normal strength and ROM.      IMAGIN2025: 3 view left thumb x-ray  - No fracture, dislocation, or bony lesion.  Mild degenerative changes throughout the first CMC, MCP, and IP joints.      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  X-rays were performed in the office today and personally reviewed by me with the findings as demonstrated above by my interpretation.  We reviewed treatment options which include activity modification, ice, topical medications, oral medications, and injections.  - Ice and OTC analgesics as needed  - Activities as tolerated  - If symptoms are too bothersome, recommend follow-up for an ultrasound-guided injection into the left first CMC joint      Gerald Ortiz MD  1/22/2025  9:43 AM    Total time spent with this patient was 22 minutes which included chart review, visualization and independent interpretation of images, time spent with the patient, and documentation.    Procedure time:  0 minute(s)

## 2025-02-03 ENCOUNTER — MYC MEDICAL ADVICE (OUTPATIENT)
Dept: FAMILY MEDICINE | Facility: OTHER | Age: 65
End: 2025-02-03
Payer: COMMERCIAL

## 2025-02-03 DIAGNOSIS — Z00.00 ENCOUNTER FOR PHYSICAL EXAMINATION: ICD-10-CM

## 2025-02-03 DIAGNOSIS — E78.00 PURE HYPERCHOLESTEROLEMIA: ICD-10-CM

## 2025-02-03 DIAGNOSIS — Z13.1 SCREENING FOR DIABETES MELLITUS: ICD-10-CM

## 2025-02-03 DIAGNOSIS — Z12.5 SCREENING FOR PROSTATE CANCER: Primary | ICD-10-CM

## 2025-02-04 NOTE — TELEPHONE ENCOUNTER
Pt is requesting labs to be done prior to upcoming appt.     Appt is on 2/19    Owen'd up orders for PSA/Lipid/A1C/CBC/CMP    Routing to provider to review and respond.  Jasmin Steele RN on 2/4/2025 at 8:34 AM

## 2025-02-11 ENCOUNTER — LAB (OUTPATIENT)
Dept: LAB | Facility: OTHER | Age: 65
End: 2025-02-11
Attending: FAMILY MEDICINE
Payer: MEDICARE

## 2025-02-11 DIAGNOSIS — Z00.00 ENCOUNTER FOR PHYSICAL EXAMINATION: ICD-10-CM

## 2025-02-11 DIAGNOSIS — Z13.1 SCREENING FOR DIABETES MELLITUS: ICD-10-CM

## 2025-02-11 DIAGNOSIS — E78.00 PURE HYPERCHOLESTEROLEMIA: ICD-10-CM

## 2025-02-11 DIAGNOSIS — Z12.5 SCREENING FOR PROSTATE CANCER: ICD-10-CM

## 2025-02-11 LAB
ALBUMIN SERPL BCG-MCNC: 4.4 G/DL (ref 3.5–5.2)
ALP SERPL-CCNC: 58 U/L (ref 40–150)
ALT SERPL W P-5'-P-CCNC: 46 U/L (ref 0–70)
ANION GAP SERPL CALCULATED.3IONS-SCNC: 9 MMOL/L (ref 7–15)
AST SERPL W P-5'-P-CCNC: 34 U/L (ref 0–45)
BASOPHILS # BLD AUTO: 0.1 10E3/UL (ref 0–0.2)
BASOPHILS NFR BLD AUTO: 1 %
BILIRUB SERPL-MCNC: 0.5 MG/DL
BUN SERPL-MCNC: 15.5 MG/DL (ref 8–23)
CALCIUM SERPL-MCNC: 10 MG/DL (ref 8.8–10.4)
CHLORIDE SERPL-SCNC: 104 MMOL/L (ref 98–107)
CHOLEST SERPL-MCNC: 168 MG/DL
CREAT SERPL-MCNC: 1.15 MG/DL (ref 0.67–1.17)
EGFRCR SERPLBLD CKD-EPI 2021: 70 ML/MIN/1.73M2
EOSINOPHIL # BLD AUTO: 0.3 10E3/UL (ref 0–0.7)
EOSINOPHIL NFR BLD AUTO: 3 %
ERYTHROCYTE [DISTWIDTH] IN BLOOD BY AUTOMATED COUNT: 12.5 % (ref 10–15)
EST. AVERAGE GLUCOSE BLD GHB EST-MCNC: 114 MG/DL
FASTING STATUS PATIENT QL REPORTED: NORMAL
FASTING STATUS PATIENT QL REPORTED: NORMAL
GLUCOSE SERPL-MCNC: 89 MG/DL (ref 70–99)
HBA1C MFR BLD: 5.6 %
HCO3 SERPL-SCNC: 27 MMOL/L (ref 22–29)
HCT VFR BLD AUTO: 42.1 % (ref 40–53)
HDLC SERPL-MCNC: 49 MG/DL
HGB BLD-MCNC: 14.3 G/DL (ref 13.3–17.7)
IMM GRANULOCYTES # BLD: 0 10E3/UL
IMM GRANULOCYTES NFR BLD: 0 %
LDLC SERPL CALC-MCNC: 98 MG/DL
LYMPHOCYTES # BLD AUTO: 1.8 10E3/UL (ref 0.8–5.3)
LYMPHOCYTES NFR BLD AUTO: 23 %
MCH RBC QN AUTO: 30.2 PG (ref 26.5–33)
MCHC RBC AUTO-ENTMCNC: 34 G/DL (ref 31.5–36.5)
MCV RBC AUTO: 89 FL (ref 78–100)
MONOCYTES # BLD AUTO: 0.7 10E3/UL (ref 0–1.3)
MONOCYTES NFR BLD AUTO: 9 %
NEUTROPHILS # BLD AUTO: 5 10E3/UL (ref 1.6–8.3)
NEUTROPHILS NFR BLD AUTO: 64 %
NONHDLC SERPL-MCNC: 119 MG/DL
NRBC # BLD AUTO: 0 10E3/UL
NRBC BLD AUTO-RTO: 0 /100
PLATELET # BLD AUTO: 242 10E3/UL (ref 150–450)
POTASSIUM SERPL-SCNC: 4.7 MMOL/L (ref 3.4–5.3)
PROT SERPL-MCNC: 7.5 G/DL (ref 6.4–8.3)
PSA SERPL DL<=0.01 NG/ML-MCNC: 3 NG/ML (ref 0–4.5)
RBC # BLD AUTO: 4.73 10E6/UL (ref 4.4–5.9)
SODIUM SERPL-SCNC: 140 MMOL/L (ref 135–145)
TRIGL SERPL-MCNC: 107 MG/DL
WBC # BLD AUTO: 7.8 10E3/UL (ref 4–11)

## 2025-02-11 PROCEDURE — 85025 COMPLETE CBC W/AUTO DIFF WBC: CPT | Mod: ZL

## 2025-02-11 PROCEDURE — 84132 ASSAY OF SERUM POTASSIUM: CPT | Mod: ZL

## 2025-02-11 PROCEDURE — 83036 HEMOGLOBIN GLYCOSYLATED A1C: CPT | Mod: ZL

## 2025-02-11 PROCEDURE — 84478 ASSAY OF TRIGLYCERIDES: CPT | Mod: ZL

## 2025-02-11 PROCEDURE — 36415 COLL VENOUS BLD VENIPUNCTURE: CPT | Mod: ZL

## 2025-02-11 PROCEDURE — G0103 PSA SCREENING: HCPCS | Mod: ZL

## 2025-02-19 ENCOUNTER — OFFICE VISIT (OUTPATIENT)
Dept: FAMILY MEDICINE | Facility: OTHER | Age: 65
End: 2025-02-19
Attending: FAMILY MEDICINE
Payer: MEDICARE

## 2025-02-19 VITALS
OXYGEN SATURATION: 98 % | RESPIRATION RATE: 16 BRPM | BODY MASS INDEX: 29.88 KG/M2 | TEMPERATURE: 97.5 F | DIASTOLIC BLOOD PRESSURE: 92 MMHG | SYSTOLIC BLOOD PRESSURE: 158 MMHG | WEIGHT: 190.4 LBS | HEIGHT: 67 IN | HEART RATE: 90 BPM

## 2025-02-19 DIAGNOSIS — E78.00 PURE HYPERCHOLESTEROLEMIA: ICD-10-CM

## 2025-02-19 DIAGNOSIS — F41.9 ANXIETY: Primary | ICD-10-CM

## 2025-02-19 DIAGNOSIS — E66.3 OVERWEIGHT: ICD-10-CM

## 2025-02-19 PROCEDURE — G0463 HOSPITAL OUTPT CLINIC VISIT: HCPCS

## 2025-02-19 RX ORDER — ATORVASTATIN CALCIUM 40 MG/1
40 TABLET, FILM COATED ORAL DAILY
Qty: 90 TABLET | Refills: 3 | Status: SHIPPED | OUTPATIENT
Start: 2025-02-19

## 2025-02-19 RX ORDER — SERTRALINE HYDROCHLORIDE 25 MG/1
25 TABLET, FILM COATED ORAL DAILY
Qty: 90 TABLET | Refills: 1 | Status: SHIPPED | OUTPATIENT
Start: 2025-02-19

## 2025-02-19 ASSESSMENT — ANXIETY QUESTIONNAIRES
1. FEELING NERVOUS, ANXIOUS, OR ON EDGE: NOT AT ALL
GAD7 TOTAL SCORE: 0
7. FEELING AFRAID AS IF SOMETHING AWFUL MIGHT HAPPEN: NOT AT ALL
8. IF YOU CHECKED OFF ANY PROBLEMS, HOW DIFFICULT HAVE THESE MADE IT FOR YOU TO DO YOUR WORK, TAKE CARE OF THINGS AT HOME, OR GET ALONG WITH OTHER PEOPLE?: NOT DIFFICULT AT ALL
3. WORRYING TOO MUCH ABOUT DIFFERENT THINGS: NOT AT ALL
5. BEING SO RESTLESS THAT IT IS HARD TO SIT STILL: NOT AT ALL
IF YOU CHECKED OFF ANY PROBLEMS ON THIS QUESTIONNAIRE, HOW DIFFICULT HAVE THESE PROBLEMS MADE IT FOR YOU TO DO YOUR WORK, TAKE CARE OF THINGS AT HOME, OR GET ALONG WITH OTHER PEOPLE: NOT DIFFICULT AT ALL
4. TROUBLE RELAXING: NOT AT ALL
GAD7 TOTAL SCORE: 0
2. NOT BEING ABLE TO STOP OR CONTROL WORRYING: NOT AT ALL
GAD7 TOTAL SCORE: 0
6. BECOMING EASILY ANNOYED OR IRRITABLE: NOT AT ALL
7. FEELING AFRAID AS IF SOMETHING AWFUL MIGHT HAPPEN: NOT AT ALL

## 2025-02-19 ASSESSMENT — PAIN SCALES - GENERAL: PAINLEVEL_OUTOF10: NO PAIN (0)

## 2025-02-19 NOTE — NURSING NOTE
"Chief Complaint   Patient presents with    Weight Problem    Recheck Medication       Initial BP (!) 158/92 (BP Location: Right arm, Patient Position: Sitting, Cuff Size: Adult Regular)   Pulse 90   Temp 97.5  F (36.4  C) (Tympanic)   Resp 16   Ht 1.702 m (5' 7\")   Wt 86.4 kg (190 lb 6.4 oz)   SpO2 98%   BMI 29.82 kg/m   Estimated body mass index is 29.82 kg/m  as calculated from the following:    Height as of this encounter: 1.702 m (5' 7\").    Weight as of this encounter: 86.4 kg (190 lb 6.4 oz).  Medication Review: complete    The next two questions are to help us understand your food security.  If you are feeling you need any assistance in this area, we have resources available to support you today.          2/16/2024   SDOH- Food Insecurity   Within the past 12 months, did you worry that your food would run out before you got money to buy more? N   Within the past 12 months, did the food you bought just not last and you didn t have money to get more? N         Health Care Directive:  Patient does not have a Health Care Directive: Discussed advance care planning with patient; however, patient declined at this time.    Kinjal Rangel      "

## 2025-02-19 NOTE — PROGRESS NOTES
"  Assessment & Plan       ICD-10-CM    1. Anxiety  F41.9 sertraline (ZOLOFT) 25 MG tablet      2. Pure hypercholesterolemia  E78.00 atorvastatin (LIPITOR) 40 MG tablet      3. Overweight  E66.3         Reviewed with patient that his current BMI is consistent with being overweight, not obese.  Overweight +1 risk factor (hypercholesterolemia) technically could qualify him for use of a GLP-1 RA medication.  He has contacted his insurance, he is aware of what this would cost.    Reviewed potential side effects of the medication, potential risks.  We reviewed ongoing issues with supply, cost concerns, some of these which should resolve over the course of the next couple of years.  Reviewed likely need for prior authorization, possibility of lapses in availability of medication.    We reviewed unknown maintenance recommendations for maintaining weight loss at this time.    We reviewed other options for weight loss.  We discussed that historically he tends to lose weight in the spring and summer when he is generally more active.    He is going to think of above factors and let us know if he would like to proceed with this medication.    He would also like to wean off of Zoloft.  He is currently taking 25 mg.  Would encourage him to wait until closer to the spring, and then go down by half for a week, then he can probably stop from there.        BMI  Estimated body mass index is 29.82 kg/m  as calculated from the following:    Height as of this encounter: 1.702 m (5' 7\").    Weight as of this encounter: 86.4 kg (190 lb 6.4 oz).       No follow-ups on file.    Stephane Stewart is a 64 year old, presenting for the following health issues:  Weight Problem and Recheck Medication        2/19/2025     9:27 AM   Additional Questions   Roomed by Kinjal MAGAÑA   Accompanied by self     History of Present Illness       Reason for visit:  Physical   He is taking medications regularly.       Can't exercise much because of " "knee  Patient's status post left knee replacement.   Currently managing her right knee pain with steroid injections as needed, likely will need replacement at some point in the future.    Despite feeling like he really is eating healthy, continues to have some weight gain.    Rarely drinks alcohol.    Overall feels great, feels healthy mentally and physically.    Curious about weight loss injection medications.          Objective    BP (!) 158/92 (BP Location: Right arm, Patient Position: Sitting, Cuff Size: Adult Regular)   Pulse 90   Temp 97.5  F (36.4  C) (Tympanic)   Resp 16   Ht 1.702 m (5' 7\")   Wt 86.4 kg (190 lb 6.4 oz)   SpO2 98%   BMI 29.82 kg/m    Body mass index is 29.82 kg/m .  Physical Exam  Constitutional:       Appearance: He is well-developed.   HENT:      Right Ear: External ear normal.      Left Ear: External ear normal.   Eyes:      General: No scleral icterus.     Conjunctiva/sclera: Conjunctivae normal.   Cardiovascular:      Rate and Rhythm: Normal rate.   Pulmonary:      Effort: Pulmonary effort is normal. No respiratory distress.   Skin:     Findings: No rash.   Neurological:      Mental Status: He is alert.          Recent Results (from the past 240 hours)   PSA Screen GH    Collection Time: 02/11/25  2:29 PM   Result Value Ref Range    Prostate Specific Antigen Screen 3.00 0.00 - 4.50 ng/mL   Lipid Panel    Collection Time: 02/11/25  2:29 PM   Result Value Ref Range    Cholesterol 168 <200 mg/dL    Triglycerides 107 <150 mg/dL    Direct Measure HDL 49 >=40 mg/dL    LDL Cholesterol Calculated 98 <100 mg/dL    Non HDL Cholesterol 119 <130 mg/dL    Patient Fasting > 8hrs? Unknown    Hemoglobin A1c    Collection Time: 02/11/25  2:29 PM   Result Value Ref Range    Estimated Average Glucose 114 <117 mg/dL    Hemoglobin A1C 5.6 <5.7 %   Comprehensive Metabolic Panel    Collection Time: 02/11/25  2:29 PM   Result Value Ref Range    Sodium 140 135 - 145 mmol/L    Potassium 4.7 3.4 - 5.3 " mmol/L    Carbon Dioxide (CO2) 27 22 - 29 mmol/L    Anion Gap 9 7 - 15 mmol/L    Urea Nitrogen 15.5 8.0 - 23.0 mg/dL    Creatinine 1.15 0.67 - 1.17 mg/dL    GFR Estimate 70 >60 mL/min/1.73m2    Calcium 10.0 8.8 - 10.4 mg/dL    Chloride 104 98 - 107 mmol/L    Glucose 89 70 - 99 mg/dL    Alkaline Phosphatase 58 40 - 150 U/L    AST 34 0 - 45 U/L    ALT 46 0 - 70 U/L    Protein Total 7.5 6.4 - 8.3 g/dL    Albumin 4.4 3.5 - 5.2 g/dL    Bilirubin Total 0.5 <=1.2 mg/dL    Patient Fasting > 8hrs? Unknown    CBC with platelets and differential    Collection Time: 02/11/25  2:29 PM   Result Value Ref Range    WBC Count 7.8 4.0 - 11.0 10e3/uL    RBC Count 4.73 4.40 - 5.90 10e6/uL    Hemoglobin 14.3 13.3 - 17.7 g/dL    Hematocrit 42.1 40.0 - 53.0 %    MCV 89 78 - 100 fL    MCH 30.2 26.5 - 33.0 pg    MCHC 34.0 31.5 - 36.5 g/dL    RDW 12.5 10.0 - 15.0 %    Platelet Count 242 150 - 450 10e3/uL    % Neutrophils 64 %    % Lymphocytes 23 %    % Monocytes 9 %    % Eosinophils 3 %    % Basophils 1 %    % Immature Granulocytes 0 %    NRBCs per 100 WBC 0 <1 /100    Absolute Neutrophils 5.0 1.6 - 8.3 10e3/uL    Absolute Lymphocytes 1.8 0.8 - 5.3 10e3/uL    Absolute Monocytes 0.7 0.0 - 1.3 10e3/uL    Absolute Eosinophils 0.3 0.0 - 0.7 10e3/uL    Absolute Basophils 0.1 0.0 - 0.2 10e3/uL    Absolute Immature Granulocytes 0.0 <=0.4 10e3/uL    Absolute NRBCs 0.0 10e3/uL             Signed Electronically by: Leah Oscar MD

## 2025-03-04 ENCOUNTER — MYC MEDICAL ADVICE (OUTPATIENT)
Dept: FAMILY MEDICINE | Facility: OTHER | Age: 65
End: 2025-03-04
Payer: COMMERCIAL

## 2025-03-04 DIAGNOSIS — E66.3 OVERWEIGHT (BMI 25.0-29.9): Primary | ICD-10-CM

## 2025-03-04 DIAGNOSIS — Z91.89 AT RISK FOR STROKE: ICD-10-CM

## 2025-03-04 DIAGNOSIS — E78.00 PURE HYPERCHOLESTEROLEMIA: ICD-10-CM

## 2025-03-04 NOTE — TELEPHONE ENCOUNTER
"Because of his ASCVD risk score of 17% have added \"at risk for stroke\" and signed the order for wegovy per patient request after reviewing the last visit notes.     Leah - please let me know if you are NOT ok with this.     Sincerely,   Janette Trinidad NP on 3/4/2025 at 3:59 PM        The 10-year ASCVD risk score (Christiano STAUFFER, et al., 2019) is: 17.4%    Values used to calculate the score:      Age: 64 years      Sex: Male      Is Non- : No      Diabetic: No      Tobacco smoker: No      Systolic Blood Pressure: 158 mmHg      Is BP treated: No      HDL Cholesterol: 49 mg/dL      Total Cholesterol: 168 mg/dL    "

## 2025-03-04 NOTE — TELEPHONE ENCOUNTER
"BMI 29.8.   Asking for Wegovy for weight loss.     \"Medicare said that if I'm at risk for stroke- I can get coverage for Wegovy.  Now that I'm inches away from obesity- can't that work too?\"    My Thought:  Unless we have that BMI of 30 or greater- we can't put obesity on your chart for medication approval. We will try ordering Wegovy for your current BMI (overweight) and hyperlipidemia and see if it gets approved.     Owen'd up Wegovy starting dose.     Is there something to associate for \"risk for stroke\"?    ___________________________________________________________    2/19/25  LOV with Tofte for weight/anxiety/hyperlipidemia.     Reviewed with patient that his current BMI is consistent with being overweight, not obese. Overweight +1 risk factor (hypercholesterolemia) technically could qualify him for use of a GLP-1 RA medication. He has contacted his insurance, he is aware of what this would cost.     Alberta Alarcon RN on 3/4/2025 at 11:17 AM    "

## 2025-03-05 ENCOUNTER — OFFICE VISIT (OUTPATIENT)
Dept: FAMILY MEDICINE | Facility: OTHER | Age: 65
End: 2025-03-05
Attending: STUDENT IN AN ORGANIZED HEALTH CARE EDUCATION/TRAINING PROGRAM
Payer: MEDICARE

## 2025-03-05 VITALS
OXYGEN SATURATION: 94 % | TEMPERATURE: 100.3 F | SYSTOLIC BLOOD PRESSURE: 130 MMHG | WEIGHT: 193 LBS | DIASTOLIC BLOOD PRESSURE: 82 MMHG | BODY MASS INDEX: 30.23 KG/M2 | HEART RATE: 103 BPM | RESPIRATION RATE: 21 BRPM

## 2025-03-05 DIAGNOSIS — J10.1 INFLUENZA A: Primary | ICD-10-CM

## 2025-03-05 DIAGNOSIS — R05.1 ACUTE COUGH: ICD-10-CM

## 2025-03-05 DIAGNOSIS — R50.9 FEVER IN ADULT: ICD-10-CM

## 2025-03-05 LAB
FLUAV RNA SPEC QL NAA+PROBE: POSITIVE
FLUBV RNA RESP QL NAA+PROBE: NEGATIVE
RSV RNA SPEC NAA+PROBE: NEGATIVE
SARS-COV-2 RNA RESP QL NAA+PROBE: NEGATIVE

## 2025-03-05 PROCEDURE — 87637 SARSCOV2&INF A&B&RSV AMP PRB: CPT | Mod: ZL | Performed by: STUDENT IN AN ORGANIZED HEALTH CARE EDUCATION/TRAINING PROGRAM

## 2025-03-05 PROCEDURE — G0463 HOSPITAL OUTPT CLINIC VISIT: HCPCS

## 2025-03-05 RX ORDER — OSELTAMIVIR PHOSPHATE 75 MG/1
75 CAPSULE ORAL 2 TIMES DAILY
Qty: 10 CAPSULE | Refills: 0 | Status: SHIPPED | OUTPATIENT
Start: 2025-03-05 | End: 2025-03-10

## 2025-03-05 ASSESSMENT — PAIN SCALES - GENERAL: PAINLEVEL_OUTOF10: MILD PAIN (2)

## 2025-03-05 NOTE — PROGRESS NOTES
Assessment & Plan     (J10.1) Influenza A  (primary encounter diagnosis)    Comment: Influenza A.  Symptoms x 36 to 48 hours.  Temperature elevated at 100.3  F.  This is consistent with viral illness.  Lung sounds are clear.  Tolerating oral intake.  No difficulty breathing.    Plan: oseltamivir (TAMIFLU) 75 MG capsule          Plan to treat with Tamiflu twice a day for 5 days.  Continue over-the-counter management.  Follow-up with PCP for any persisting symptoms.  Return to rapid clinic or ER for any worsening or changing symptoms.  He is comfortable and agreeable with this plan.    (R05.1) Acute cough  Comment: Influenza A.  Plan: Influenza A/B, RSV and SARS-CoV2 PCR (COVID-19)        Nose            (R50.9) Fever in adult  Comment: Influenza A.  Plan: Influenza A/B, RSV and SARS-CoV2 PCR (COVID-19)        Nose              Subjective   Terry is a 64 year old, presenting for the following health issues:  Flu Symptoms    HPI     Patient presents today with concerns of cough, congestion, body aches.  He notes symptoms started as a scratchy throat two days ago, yesterday more obvious.  He notes he has been using over-the-counter medications to help with symptoms.  No notable sore throat.  He needs to continue to work on drinking fluids.  He does note exposure to many different illnesses through his grandchildren.      Review of Systems  Constitutional, HEENT, cardiovascular, pulmonary, gi and gu systems are negative, except as otherwise noted.        Objective    /82   Pulse 103   Temp 100.3  F (37.9  C) (Tympanic)   Resp 21   Wt 87.5 kg (193 lb)   SpO2 94%   BMI 30.23 kg/m    Body mass index is 30.23 kg/m .    Physical Exam   GENERAL: alert and no distress  EYES: Eyes grossly normal to inspection, PERRL and conjunctivae and sclerae normal  HENT: ear canals and TM's normal, nose and mouth without ulcers or lesions  NECK: no adenopathy, no asymmetry, masses, or scars  RESP: lungs clear to auscultation -  no rales, rhonchi or wheezes  CV: regular rate and rhythm, normal S1 S2, no S3 or S4, no murmur, click or rub, no peripheral edema  MS: no gross musculoskeletal defects noted, no edema    Results for orders placed or performed in visit on 03/05/25   Influenza A/B, RSV and SARS-CoV2 PCR (COVID-19) Nose     Status: Abnormal    Specimen: Nose; Swab   Result Value Ref Range    Influenza A PCR Positive (A) Negative    Influenza B PCR Negative Negative    RSV PCR Negative Negative    SARS CoV2 PCR Negative Negative    Narrative    Testing was performed using the Xpert Xpress CoV2/Flu/RSV Assay on the Cepheid GeneXpert Instrument. This test should be ordered for the detection of SARS-CoV2, influenza, and RSV viruses in individuals with signs and symptoms of respiratory tract infection. This test is for in vitro diagnostic use under the [place] FDA for laboratories certified under CLIA to perform high or moderate complexity testing. This test has been [place] FDA cleared. A negative result does not rule out the presence of PCR inhibitors in the specimen or target RNA in concentration below the limit of detection for the assay. If only one viral target is positive but coinfection with multiple targets is suspected, the sample should be re-tested with another FDA cleared, approved, or authorized test, if coninfection would change clinical management. This test was validated by the [facility]. These laboratories are certified under the Clinical Laboratory Improvement Amendments of 1988 (CLIA-88) as qualified to perfom high complexity laboratory testing.         Signed Electronically by: Lorie Thomas PA-C

## 2025-03-05 NOTE — NURSING NOTE
"Chief Complaint   Patient presents with    Flu Symptoms     Patient here for cough, body aches, dizziness and fever x2 days.       Initial /82   Pulse 103   Temp 100.3  F (37.9  C) (Tympanic)   Resp 21   Wt 87.5 kg (193 lb)   SpO2 94%   BMI 30.23 kg/m   Estimated body mass index is 30.23 kg/m  as calculated from the following:    Height as of 2/19/25: 1.702 m (5' 7\").    Weight as of this encounter: 87.5 kg (193 lb).  Medication Review: complete    The next two questions are to help us understand your food security.  If you are feeling you need any assistance in this area, we have resources available to support you today.          2/16/2024   SDOH- Food Insecurity   Within the past 12 months, did you worry that your food would run out before you got money to buy more? N   Within the past 12 months, did the food you bought just not last and you didn t have money to get more? N         Health Care Directive:  Patient does not have a Health Care Directive: Discussed advance care planning with patient; however, patient declined at this time.    Marycarmen Ellis LPN      "

## 2025-03-05 NOTE — TELEPHONE ENCOUNTER
Called  to look in to [employer]/PA.      Sent for PA yesterday.     Patient update on [employer].    Alberta Alarcon RN on 3/5/2025 at 2:16 PM

## 2025-03-29 ENCOUNTER — MYC MEDICAL ADVICE (OUTPATIENT)
Dept: FAMILY MEDICINE | Facility: OTHER | Age: 65
End: 2025-03-29
Payer: COMMERCIAL

## 2025-04-10 ENCOUNTER — OFFICE VISIT (OUTPATIENT)
Dept: FAMILY MEDICINE | Facility: OTHER | Age: 65
End: 2025-04-10
Attending: FAMILY MEDICINE
Payer: MEDICARE

## 2025-04-10 VITALS
OXYGEN SATURATION: 98 % | WEIGHT: 188 LBS | BODY MASS INDEX: 29.44 KG/M2 | DIASTOLIC BLOOD PRESSURE: 80 MMHG | HEART RATE: 74 BPM | SYSTOLIC BLOOD PRESSURE: 138 MMHG | TEMPERATURE: 97.8 F | RESPIRATION RATE: 18 BRPM

## 2025-04-10 DIAGNOSIS — M17.11 PRIMARY OSTEOARTHRITIS OF RIGHT KNEE: Primary | ICD-10-CM

## 2025-04-10 PROCEDURE — 20610 DRAIN/INJ JOINT/BURSA W/O US: CPT | Performed by: FAMILY MEDICINE

## 2025-04-10 PROCEDURE — 250N000011 HC RX IP 250 OP 636: Mod: JZ | Performed by: FAMILY MEDICINE

## 2025-04-10 PROCEDURE — 250N000009 HC RX 250: Performed by: FAMILY MEDICINE

## 2025-04-10 PROCEDURE — G0463 HOSPITAL OUTPT CLINIC VISIT: HCPCS | Mod: 25

## 2025-04-10 RX ORDER — TRIAMCINOLONE ACETONIDE 40 MG/ML
40 INJECTION, SUSPENSION INTRA-ARTICULAR; INTRAMUSCULAR ONCE
Status: COMPLETED | OUTPATIENT
Start: 2025-04-10 | End: 2025-04-10

## 2025-04-10 RX ORDER — BUPIVACAINE HYDROCHLORIDE 5 MG/ML
2 INJECTION, SOLUTION EPIDURAL; INTRACAUDAL; PERINEURAL ONCE
Status: COMPLETED | OUTPATIENT
Start: 2025-04-10 | End: 2025-04-10

## 2025-04-10 RX ORDER — LIDOCAINE HYDROCHLORIDE 10 MG/ML
2 INJECTION, SOLUTION INFILTRATION; PERINEURAL ONCE
Status: COMPLETED | OUTPATIENT
Start: 2025-04-10 | End: 2025-04-10

## 2025-04-10 RX ADMIN — LIDOCAINE HYDROCHLORIDE 2 ML: 10 INJECTION, SOLUTION INFILTRATION; PERINEURAL at 09:33

## 2025-04-10 RX ADMIN — TRIAMCINOLONE ACETONIDE 40 MG: 40 INJECTION, SUSPENSION INTRA-ARTICULAR; INTRAMUSCULAR at 09:33

## 2025-04-10 RX ADMIN — BUPIVACAINE HYDROCHLORIDE 10 MG: 5 INJECTION, SOLUTION EPIDURAL; INTRACAUDAL; PERINEURAL at 09:33

## 2025-04-10 ASSESSMENT — PAIN SCALES - GENERAL: PAINLEVEL_OUTOF10: MODERATE PAIN (6)

## 2025-04-10 NOTE — NURSING NOTE
"Chief Complaint   Patient presents with    Follow Up     Right knee injection. Last received on 9/4/2024       Initial /80 (BP Location: Right arm, Patient Position: Sitting, Cuff Size: Adult Large)   Pulse 74   Temp 97.8  F (36.6  C) (Temporal)   Resp 18   Wt 85.3 kg (188 lb)   SpO2 98%   BMI 29.44 kg/m   Estimated body mass index is 29.44 kg/m  as calculated from the following:    Height as of 2/19/25: 1.702 m (5' 7\").    Weight as of this encounter: 85.3 kg (188 lb).  Medication Review: complete    The next two questions are to help us understand your food security.  If you are feeling you need any assistance in this area, we have resources available to support you today.          2/16/2024   SDOH- Food Insecurity   Within the past 12 months, did you worry that your food would run out before you got money to buy more? N   Within the past 12 months, did the food you bought just not last and you didn t have money to get more? N         Delisa Morin LPN      "

## 2025-04-10 NOTE — PROGRESS NOTES
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  He has had pain for several years.  He was last seen in this office on 9/4/2024.  At that time he received an injection.  This provided approximately 6 months of symptom improvement.  His pain has returned.  He rates his pain at 7/10.  He characterized the pain as stabbing.  His pain is localized to the medial aspect of the joint.  He has plans for a golf trip next week and is hoping to get some relief prior to this trip.      EXAM:  /80 (BP Location: Right arm, Patient Position: Sitting, Cuff Size: Adult Large)   Pulse 74   Temp 97.8  F (36.6  C) (Temporal)   Resp 18   Wt 85.3 kg (188 lb)   SpO2 98%   BMI 29.44 kg/m    MUSCULOSKELETAL EXAM:  RIGHT KNEE  Inspection:  -No gross deformity  -No bruising or soft tissue swelling  -Scars:  None    Tenderness to palpation of the:  -Quadriceps musculature:  Negative  -Quadriceps tendon:  Negative  -Patella:  Negative  -Medial patellar facet:  Negative  -Lateral patellar facet:  Negative  -Inferior pole of the patella:  Negative  -Patellar tendon:  Negative  -Tibial tuberosity:  Negative  -Medial joint line: Positive  -Medial collateral ligament:  Negative  -Medial hamstring tendons:  Negative  -Medial femoral condyle:  Negative  -Medial tibial plateau:  Negative  -Pes anserine bursa:  Negative  -Lateral joint line:  Negative  -Distal IT band:  Negative  -Gerdy's tubercle:  Negative  -Lateral collateral ligament:  Negative  -Lateral hamstring tendons:  Negative  -Lateral femoral condyle:  Negative  -Lateral tibial plateau:  Negative  -Posterior lateral corner:  Negative  -Popliteal fossa:  Negative    Range of Motion:  -Passive extension:  0    Special Tests:  -Effusion:  Absent  -Valgus stress:  Negative  -Varus stress:  Negative    Other:  -Intact sensation to light touch distally.  -No signs of cyanosis. Normal skin temperature of the lower  extremity.  -Foot/ankle:  No gross deformity. Full range of motion.  -Left knee:  No gross deformity. No palpable tenderness. Normal strength and ROM.      IMAGING:  10/23/2023: 3 view left knee x-ray  - New hemiarthroplasty involving the medial femoral condyle of the left knee.  Osteochondral defect involving the medial femoral condyle.      ASSESSMENT/PLAN:  Diagnoses and all orders for this visit:  Primary osteoarthritis of right knee  -     DRAIN/INJECT LARGE JOINT/BURSA  -     triamcinolone (KENALOG-40) injection 40 mg  -     lidocaine 1 % injection 2 mL  -     Bupivacaine 0.5% 2mL Intra-articular    64-year-old male with right knee arthritis.  X-rays from October 2023 were again personally reviewed in the office with the findings as demonstrated above by my interpretation.  He has gotten significant improvement with previous injections.  He is interested in a repeat injection.  After reviewing the risks/benefits, the patient elects to proceed with an intra-articular CSI.  See procedure note below:    PROCEDURE:  Intraarticular Injection of the Right Knee     PROCEDURAL PAUSE:    A procedural pause was conducted to verify:  correct patient identity, procedure to be performed, and as applicable, correct side, site, and correct patient position.      INFORMED CONSENT:   I discussed the risks, possible benefits, and alternatives to injection.  Following denial of allergy and review of potential side effects and complications (including but not necessarily limited to infection, allergic reaction, fat necrosis, skin depigmentation, local tissue breakdown, systemic effects of corticosteroids, elevation of blood glucose, injury to soft tissue and/or nerves, and seizure), all questions were answered, and consent was given to proceed.  Patient verbalized understanding.      PROCEDURE DETAILS:    Side and site were marked, and a time out was performed to verify appropriate patient identifiers.  Following this the right  knee, just superior to the tibial plateau and medial to the patellar tendon, was prepped with chlorhexidine.  Utilizing a 22-gauge needle the right intraarticular knee was injected using a anteromedial to posterolateral approach with 2mL of 1% Lidocaine, 2mL of 0.5% bupivacaine, and 1mL triamcinolone (40mg/mL).  0mL was wasted.      The patient tolerated the procedure without complication and was discharged in good condition after a short observation period.  The patient was instructed to contact me regarding any questions pertaining to the procedure.      DIAGNOSIS:    -Successful injection of the right intraarticular knee without immediate complication      PLAN:   -Post-procedure care reviewed, including avoiding submersion of the injection site for 48 hours   -Return precautions reviewed for signs/symptoms that would be concerning for infection   -The patient was instructed to ice and take APAP this evening if needed   -Return to clinic as needed      Gerald Ortiz MD  4/10/2025  9:03 AM    Total time spent with this patient was 32 minutes which included chart review, visualization and independent interpretation of images, time spent with the patient, and documentation.    Procedure time:  3 minute(s)

## 2025-04-29 ENCOUNTER — NURSE TRIAGE (OUTPATIENT)
Dept: FAMILY MEDICINE | Facility: OTHER | Age: 65
End: 2025-04-29
Payer: COMMERCIAL

## 2025-04-29 DIAGNOSIS — S40.869A TICK BITE OF UPPER ARM, UNSPECIFIED LATERALITY, INITIAL ENCOUNTER: Primary | ICD-10-CM
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
## 2025-04-29 RX ORDER — DOXYCYCLINE 100 MG/1
200 CAPSULE ORAL ONCE
Qty: 2 CAPSULE | Refills: 0 | Status: SHIPPED | OUTPATIENT
Start: 2025-04-29 | End: 2025-04-29

## 2025-04-29 NOTE — TELEPHONE ENCOUNTER
Patient would like a call back, states that he has a tick bite and would like to get the medications.       Elen Prather on 4/29/2025 at 8:47 AM

## 2025-04-29 NOTE — TELEPHONE ENCOUNTER
"See other nurse triage note for today.  Rx for Doxycyline sent in .  Patient informed.  Soumya Jordan RN on 4/29/2025 at 10:37 AM      Reason for Disposition   Tick bite with no complications    Additional Information   Negative: Not a tick bite   Negative: Patient sounds very sick or weak to the triager    Answer Assessment - Initial Assessment Questions  1. ATTACHED:  \"Is the tick still on the skin?\"  (e.g., yes, no, unsure)      no  2. ONSET - TICK STILL ATTACHED:  \"How long do you think the tick has been on your skin?\" (e.g., hours, days, unsure)  Note:  Is there a recent activity (camping, hiking) where the caller may have been exposed?      Unsure, no clues  3. ONSET - TICK NOT STILL ATTACHED: \"If the tick has been removed, how long do you think the tick was attached before you removed it?\" (e.g., 5 hours, 2 days). \"When was this?\"      Removed about 2 hours ago  4. LOCATION: \"Where is the tick bite located?\" (e.g., arm, leg)      Under left bicep  5. TYPE of TICK: \"Is it a wood tick or a deer tick?\" (e.g., deer tick, wood tick; unsure)      unsure  6. SIZE of TICK: \"How big is the tick?\" (e.g., size of poppy seed, apple seed, watermelon seed; unsure) Note: Deer ticks can be the size of a poppy seed (nymph) or an apple seed (adult).        Between a reg tick and deer tick  7. ENGORGED: \"Did the tick look flat or engorged (full, swollen)?\" (e.g., flat, engorged; unsure)      flat  8. OTHER SYMPTOMS: \"Do you have any other symptoms?\" (e.g., fever, rash, redness at bite area, red ring around bite)      A little red, less than a dime  9. PREGNANCY: \"Is there any chance you are pregnant?\" \"When was your last menstrual period?\"      N/a    Protocols used: Tick Bite-A-OH    "

## 2025-04-29 NOTE — TELEPHONE ENCOUNTER
"S-(situation): Patient called to report Tick Bite - Unknown when attached - Call returned.    B-(background): Patient states reports Tick Bite- he thinks it was attached for \"awhile\" was removed at approximately 8 am this morning.     A-(assessment): Described as \"Tiny\" not engorged, but deeply attached. Denies fever - Unsure if rash,described  mild redness at site. No other symptoms  Unable to get more detailed information, as Patient was in a meeting and had no time.    R-(recommendations): Patient stated he called to request antibiotic. Will route to covering Provider for advise.     Reason for Disposition   Tick bite with no complications    Additional Information   Negative: Not a tick bite   Negative: Patient sounds very sick or weak to the triager   Negative: Fever or severe headache occurs, 2 to 14 days following the bite   Negative: Widespread rash occurs, 2 to 14 days following the bite   Negative: Can't remove live tick (after using Care Advice)   Negative: Fever and spreading red area or streak   Negative: Fever and area is very tender to touch   Negative: Red streak or red line and length > 2 inches (5 cm)   Negative: Red or very tender (to touch) area and started over 24 hours after the bite   Negative: Red ring or bull's-eye rash occurs around a deer tick bite   Negative: Probable deer tick that was attached > 36 hours (or tick appears swollen, not flat)   Negative: Patient wants to be seen    Answer Assessment - Initial Assessment Questions  1. ATTACHED:  \"Is the tick still on the skin?\"  (e.g., yes, no, unsure)      No  - removed 1 hr ago   2. ONSET - TICK STILL ATTACHED:  \"How long do you think the tick has been on your skin?\" (e.g., hours, days, unsure)  Note:  Is there a recent activity (camping, hiking) where the caller may have been exposed?      No sure - awhile  3. ONSET - TICK NOT STILL ATTACHED: \"If the tick has been removed, how long do you think the tick was attached before you removed " "it?\" (e.g., 5 hours, 2 days). \"When was this?\"      Unsure  4. LOCATION: \"Where is the tick bite located?\" (e.g., arm, leg)      Left upper arm close to arm pit  5. TYPE of TICK: \"Is it a wood tick or a deer tick?\" (e.g., deer tick, wood tick; unsure)      Unsure  6. SIZE of TICK: \"How big is the tick?\" (e.g., size of poppy seed, apple seed, watermelon seed; unsure) Note: Deer ticks can be the size of a poppy seed (nymph) or an apple seed (adult).       \"Tiny\"  7. ENGORGED: \"Did the tick look flat or engorged (full, swollen)?\" (e.g., flat, engorged; unsure)      Unsure  8. OTHER SYMPTOMS: \"Do you have any other symptoms?\" (e.g., fever, rash, redness at bite area, red ring around bite)      No   9. PREGNANCY: \"Is there any chance you are pregnant?\" \"When was your last menstrual period?\"      NO    Protocols used: Tick Bite-A-OH    "

## 2025-04-29 NOTE — TELEPHONE ENCOUNTER
The patient would like prescription sent for tick bite to [facility] [address] . He is in town so would like to pick it up right away .

## 2025-04-29 NOTE — TELEPHONE ENCOUNTER
Spoke with patient, and informed Rx called in.  Patient verbalized understanding.  Soumya Jordan RN on 4/29/2025 at 10:37 AM

## 2025-05-13 ENCOUNTER — RESULTS FOLLOW-UP (OUTPATIENT)
Dept: FAMILY MEDICINE | Facility: OTHER | Age: 65
End: 2025-05-13

## 2025-05-13 ENCOUNTER — LAB (OUTPATIENT)
Dept: LAB | Facility: OTHER | Age: 65
End: 2025-05-13
Attending: FAMILY MEDICINE
Payer: MEDICARE

## 2025-05-13 DIAGNOSIS — Z13.29 SCREENING FOR THYROID DISORDER: ICD-10-CM

## 2025-05-13 DIAGNOSIS — E03.9 HYPOTHYROIDISM, UNSPECIFIED TYPE: Primary | ICD-10-CM

## 2025-05-13 LAB
T4 FREE SERPL-MCNC: 0.72 NG/DL (ref 0.9–1.7)
TSH SERPL DL<=0.005 MIU/L-ACNC: 11.49 UIU/ML (ref 0.3–4.2)

## 2025-05-13 PROCEDURE — 36415 COLL VENOUS BLD VENIPUNCTURE: CPT | Mod: ZL

## 2025-05-13 PROCEDURE — 84439 ASSAY OF FREE THYROXINE: CPT | Mod: ZL

## 2025-05-13 PROCEDURE — 84443 ASSAY THYROID STIM HORMONE: CPT | Mod: ZL

## 2025-05-14 RX ORDER — LEVOTHYROXINE SODIUM 25 UG/1
25 TABLET ORAL
Qty: 30 TABLET | Refills: 1 | Status: SHIPPED | OUTPATIENT
Start: 2025-05-14

## 2025-05-14 NOTE — TELEPHONE ENCOUNTER
"Would like Levothyroxine sent to [facility].     Wasn't sure what dose to brittany up.     \"I wouldn't mind talking to you to better understand it.   Should I just have him schedule a physical to discuss further?      Alberta Alarcon RN on 5/14/2025 at 7:32 AM      "

## 2025-05-14 NOTE — TELEPHONE ENCOUNTER
I can sent in the starting low dose to the pharmacy, but I really want him to schedule an appointment with me to discuss and have a medicare wellness visit. Please ask him to schedule an appointment for this.     I placed the tsh order because he sent a my chart message (In hindsight I should have said an appointment was needed because now I am treating something I did not even see him for).     Janette

## 2025-05-15 ENCOUNTER — OFFICE VISIT (OUTPATIENT)
Dept: FAMILY MEDICINE | Facility: OTHER | Age: 65
End: 2025-05-15
Attending: NURSE PRACTITIONER
Payer: MEDICARE

## 2025-05-15 VITALS
BODY MASS INDEX: 29.1 KG/M2 | TEMPERATURE: 96.5 F | OXYGEN SATURATION: 94 % | HEIGHT: 67 IN | DIASTOLIC BLOOD PRESSURE: 86 MMHG | WEIGHT: 185.4 LBS | RESPIRATION RATE: 16 BRPM | HEART RATE: 78 BPM | SYSTOLIC BLOOD PRESSURE: 138 MMHG

## 2025-05-15 DIAGNOSIS — E03.9 HYPOTHYROIDISM, UNSPECIFIED TYPE: ICD-10-CM

## 2025-05-15 DIAGNOSIS — Z23 NEED FOR PNEUMOCOCCAL 20-VALENT CONJUGATE VACCINATION: ICD-10-CM

## 2025-05-15 DIAGNOSIS — Z00.00 ENCOUNTER FOR MEDICARE ANNUAL WELLNESS EXAM: Primary | ICD-10-CM

## 2025-05-15 DIAGNOSIS — E78.00 PURE HYPERCHOLESTEROLEMIA: ICD-10-CM

## 2025-05-15 DIAGNOSIS — Z23 IMMUNIZATION DUE: ICD-10-CM

## 2025-05-15 DIAGNOSIS — F41.9 ANXIETY: ICD-10-CM

## 2025-05-15 DIAGNOSIS — M17.11 PRIMARY OSTEOARTHRITIS OF RIGHT KNEE: ICD-10-CM

## 2025-05-15 DIAGNOSIS — M17.12 PRIMARY OSTEOARTHRITIS OF LEFT KNEE: ICD-10-CM

## 2025-05-15 PROCEDURE — G0009 ADMIN PNEUMOCOCCAL VACCINE: HCPCS

## 2025-05-15 PROCEDURE — G0463 HOSPITAL OUTPT CLINIC VISIT: HCPCS | Mod: 25

## 2025-05-15 RX ORDER — LEVOTHYROXINE SODIUM 25 UG/1
25 TABLET ORAL
Qty: 90 TABLET | Refills: 4 | Status: CANCELLED | OUTPATIENT
Start: 2025-05-15

## 2025-05-15 SDOH — HEALTH STABILITY: PHYSICAL HEALTH: ON AVERAGE, HOW MANY DAYS PER WEEK DO YOU ENGAGE IN MODERATE TO STRENUOUS EXERCISE (LIKE A BRISK WALK)?: 4 DAYS

## 2025-05-15 ASSESSMENT — SOCIAL DETERMINANTS OF HEALTH (SDOH): HOW OFTEN DO YOU GET TOGETHER WITH FRIENDS OR RELATIVES?: TWICE A WEEK

## 2025-05-15 ASSESSMENT — ANXIETY QUESTIONNAIRES
3. WORRYING TOO MUCH ABOUT DIFFERENT THINGS: NOT AT ALL
GAD7 TOTAL SCORE: 0
6. BECOMING EASILY ANNOYED OR IRRITABLE: NOT AT ALL
GAD7 TOTAL SCORE: 0
2. NOT BEING ABLE TO STOP OR CONTROL WORRYING: NOT AT ALL
GAD7 TOTAL SCORE: 0
1. FEELING NERVOUS, ANXIOUS, OR ON EDGE: NOT AT ALL
5. BEING SO RESTLESS THAT IT IS HARD TO SIT STILL: NOT AT ALL
4. TROUBLE RELAXING: NOT AT ALL
7. FEELING AFRAID AS IF SOMETHING AWFUL MIGHT HAPPEN: NOT AT ALL
7. FEELING AFRAID AS IF SOMETHING AWFUL MIGHT HAPPEN: NOT AT ALL

## 2025-05-15 ASSESSMENT — PAIN SCALES - GENERAL: PAINLEVEL_OUTOF10: NO PAIN (0)

## 2025-05-15 NOTE — PROGRESS NOTES
Preventive Care Visit  [facility]  Janette Trinidad NP, Family Medicine  May 15, 2025      Assessment & Plan   Problem List Items Addressed This Visit       Hyperlipemia    Anxiety    Primary osteoarthritis of left knee     Other Visit Diagnoses         Encounter for Medicare annual wellness exam    -  Primary      Hypothyroidism, unspecified type        Relevant Orders    TSH Reflex GH      Immunization due        Relevant Orders    PNEUMOCOCCAL 20 VALENT CONJUGATE (PREVNAR 20) (Completed)      Need for pneumococcal 20-valent conjugate vaccination        Relevant Orders    PNEUMOCOCCAL 20 VALENT CONJUGATE (PREVNAR 20) (Completed)      Primary osteoarthritis of right knee                 Patient has been advised of split billing requirements and indicates understanding: Yes    1. Encounter for Medicare annual wellness exam (Primary)  Routine health maintenance discussed. He is up to date on recommended screenings. Labs reviewed in chart from 3 months ago without concerns.     2. Hypothyroidism, unspecified type  - TSH Reflex GH; Future  Initiated 25 mcg daily of levothyroxine; recheck in 8 weeks. We discuss hypothyroidism; this could be contributing factor to stubborn weight. He will hold off on any initiation of weight loss medication until he sees how he is doing in the next couple of months on synthroid.   Tsh drawn 2 days ago and showed elevated tsh and low free t4. This is a new diagnosis.     3. Immunization due  4. Need for pneumococcal 20-valent conjugate vaccination  - PNEUMOCOCCAL 20 VALENT CONJUGATE (PREVNAR 20)  administered today     5. Pure hypercholesterolemia  Continue current dose of atorvastatin. Lipid panel well controlled when checked 3 months ago     6. Anxiety  Well controlled. No longer on sertraline or any mental health medications.     7. Primary osteoarthritis of left knee  Doing well; partial replacement     8. Primary osteoarthritis of right knee  Working with  "orthopedics on management of knee pain. He has received injections.        BMI  Estimated body mass index is 29.04 kg/m  as calculated from the following:    Height as of this encounter: 1.702 m (5' 7\").    Weight as of this encounter: 84.1 kg (185 lb 6.4 oz).   Weight management plan: Discussed healthy diet and exercise guidelines    Counseling  Appropriate preventive services were addressed with this patient via screening, questionnaire, or discussion as appropriate for fall prevention, nutrition, physical activity, Tobacco-use cessation, social engagement, weight loss and cognition.  Checklist reviewing preventive services available has been given to the patient.  Reviewed patient's diet, addressing concerns and/or questions.       Stephane Stewart is a 64 year old, presenting for the following:  Medicare Visit        5/15/2025    10:30 AM   Additional Questions   Roomed by FAB Savage   Accompanied by Self           HPI       Terry presents to talk about some weight issues and follow up on thyroid lab that was recently drawn upon his request as well as a medicare wellness visit. The thyroid level shows hypothyroidism that was recently drawn last week. He has never been diagnosed with hypothyroidism and has not been treated in the past. He was advised to have his TSH drawn prior to getting on a weight loss medication (wegovy) through a program out of [place], [place]. He has not yet started wegovy. It appears he was previously prescribed wegovy and metformin by PCP but never did pick these up or start them. He has noticed a 15 lb weight gain or so over the past couple of years. Historically he has always been around 170 and now has been up to 185 -190 at the most. He does not eat badly and is regularly active. He does not understand why he is gaining weight.     He has had some knee issues and therefore somewhat less active, but still active. Left knee has been partially replaced. He has been having cortisone shots for " right knee pain. He is anticipating in the future needing a right knee replacement but for now- he is following Dr. Stevenson's recommendations and seeing how that works.    He endorses some serious mental health problems/anxiety issues a few years back which are resolved. He recently stopped taking sertraline. He feels good, life is going well and he is happy. He was on sertraline for a few years.      He does not endorse any other health concerns at this time.       Advance Care Planning    Discussed advance care planning with patient; however, patient declined at this time.        5/15/2025   General Health   How would you rate your overall physical health? Good   Feel stress (tense, anxious, or unable to sleep) Only a little   (!) STRESS CONCERN      5/15/2025   Nutrition   Diet: Regular (no restrictions)         5/15/2025   Exercise   Days per week of moderate/strenous exercise 4 days         5/15/2025   Social Factors   Frequency of gathering with friends or relatives Twice a week   Worry food won't last until get money to buy more No   Food not last or not have enough money for food? No   Do you have housing? (Housing is defined as stable permanent housing and does not include staying outside in a car, in a tent, in an abandoned building, in an overnight shelter, or couch-surfing.) Yes   Are you worried about losing your housing? No   Lack of transportation? No   Unable to get utilities (heat,electricity)? No         5/15/2025   Fall Risk   Fallen 2 or more times in the past year? No   Trouble with walking or balance? Yes   Gait Speed Test (Document in seconds) 2.76   Gait Speed Test Interpretation Less than or equal to 5.00 seconds - PASS          5/15/2025   Activities of Daily Living- Home Safety   Needs help with the following daily activites None of the above   Safety concerns in the home None of the above         5/15/2025   Dental   Dentist two times every year? Yes         5/15/2025   Hearing Screening    Hearing concerns? None of the above         5/15/2025   Driving Risk Screening   Patient/family members have concerns about driving No         5/15/2025   General Alertness/Fatigue Screening   Have you been more tired than usual lately? No         5/15/2025   Urinary Incontinence Screening   Bothered by leaking urine in past 6 months No       Today's PHQ-2 Score:       5/15/2025    10:27 AM   PHQ-2 ( 1999 Pf[employer])   Q1: Little interest or pleasure in doing things 0   Q2: Feeling down, depressed or hopeless 0   PHQ-2 Score 0    Q1: Little interest or pleasure in doing things Not at all   Q2: Feeling down, depressed or hopeless Not at all   PHQ-2 Score 0       Patient-reported           5/15/2025   Substance Use   Alcohol more than 3/day or more than 7/wk No   Do you have a current opioid prescription? No   How severe/bad is pain from 1 to 10? 7/10   Do you use any other substances recreationally? No     Social History     Tobacco Use    Smoking status: Never    Smokeless tobacco: Never   Vaping Use    Vaping status: Never Used   Substance Use Topics    Alcohol use: Not Currently     Comment: 6 beers per year    Drug use: Never           5/15/2025   AAA Screening   Family history of Abdominal Aortic Aneurysm (AAA)? No   Last PSA:   Prostate Specific Antigen Screen   Date Value Ref Range Status   02/11/2025 3.00 0.00 - 4.50 ng/mL Final     ASCVD Risk   The 10-year ASCVD risk score (Christiano STAUFFER, et al., 2019) is: 14%    Values used to calculate the score:      Age: 64 years      Sex: Male      Is Non- : No      Diabetic: No      Tobacco smoker: No      Systolic Blood Pressure: 138 mmHg      Is BP treated: No      HDL Cholesterol: 49 mg/dL      Total Cholesterol: 168 mg/dL          Reviewed and updated as needed this visit by Provider   Tobacco   Meds   Med Hx  Surg Hx  Fam Hx            Current providers sharing in care for this patient include:  Patient Care Team:  Leah Oscar MD  "as PCP - General (Family Practice)  Leah Oscar MD as Assigned PCP    The following health maintenance items are reviewed in Epic and correct as of today:  Health Maintenance   Topic Date Due    ZOSTER IMMUNIZATION (2 of 2) 08/13/2019    COVID-19 Vaccine (5 - 2024-25 season) 09/01/2024    INFLUENZA VACCINE (Season Ended) 09/01/2025    DTAP/TDAP/TD IMMUNIZATION (2 - Td or Tdap) 12/19/2025    COLORECTAL CANCER SCREENING  01/09/2026    LIPID  02/11/2026    TSH W/FREE T4 REFLEX  05/13/2026    MEDICARE ANNUAL WELLNESS VISIT  05/15/2026    FALL RISK ASSESSMENT  05/15/2026    DIABETES SCREENING  02/11/2028    ADVANCE CARE PLANNING  05/15/2030    RSV VACCINE (1 - 1-dose 75+ series) 08/27/2033    HEPATITIS C SCREENING  Completed    PHQ-2 (once per calendar year)  Completed    Pneumococcal Vaccine: 50+ Years  Completed    HPV IMMUNIZATION  Aged Out    MENINGITIS IMMUNIZATION  Aged Out       Review of Systems  Constitutional, HEENT, cardiovascular, pulmonary, gi and gu systems are negative, except as otherwise noted.     Objective    Exam  /86   Pulse 78   Temp (!) 96.5  F (35.8  C) (Tympanic)   Resp 16   Ht 1.702 m (5' 7\")   Wt 84.1 kg (185 lb 6.4 oz)   SpO2 94%   BMI 29.04 kg/m     Estimated body mass index is 29.04 kg/m  as calculated from the following:    Height as of this encounter: 1.702 m (5' 7\").    Weight as of this encounter: 84.1 kg (185 lb 6.4 oz).    Physical Exam  Vitals and nursing note reviewed.   Constitutional:       General: He is not in acute distress.     Appearance: Normal appearance. He is normal weight. He is not ill-appearing or toxic-appearing.   HENT:      Head: Normocephalic and atraumatic.      Right Ear: Tympanic membrane, ear canal and external ear normal. There is no impacted cerumen.      Left Ear: Tympanic membrane, ear canal and external ear normal. There is no impacted cerumen.      Nose: Nose normal. No congestion or rhinorrhea.      Mouth/Throat:      Mouth: Mucous " membranes are moist.      Pharynx: No oropharyngeal exudate or posterior oropharyngeal erythema.   Eyes:      Extraocular Movements: Extraocular movements intact.      Pupils: Pupils are equal, round, and reactive to light.   Neck:      Vascular: No carotid bruit.   Cardiovascular:      Rate and Rhythm: Normal rate and regular rhythm.      Pulses: Normal pulses.      Heart sounds: Normal heart sounds. No murmur heard.  Pulmonary:      Effort: Pulmonary effort is normal. No respiratory distress.      Breath sounds: Normal breath sounds. No stridor. No wheezing, rhonchi or rales.   Chest:      Chest wall: No tenderness.   Abdominal:      General: Abdomen is flat. Bowel sounds are normal. There is no distension.      Palpations: Abdomen is soft. There is no mass.      Tenderness: There is no abdominal tenderness. There is no right CVA tenderness, left CVA tenderness, guarding or rebound.      Hernia: No hernia is present.   Musculoskeletal:         General: Normal range of motion.      Cervical back: Normal range of motion and neck supple. No rigidity or tenderness.      Right lower leg: No edema.      Left lower leg: No edema.   Lymphadenopathy:      Cervical: No cervical adenopathy.   Skin:     General: Skin is warm and dry.      Coloration: Skin is not jaundiced or pale.      Findings: No bruising.   Neurological:      General: No focal deficit present.      Mental Status: He is alert and oriented to person, place, and time.   Psychiatric:         Mood and Affect: Mood normal.         Behavior: Behavior normal.             5/15/2025   Mini Cog   Clock Draw Score 2 Normal   3 Item Recall 1 object recalled   Mini Cog Total Score 3              Signed Electronically by: Janette Trinidad NP    Answers submitted by the patient for this visit:  Patient Health Questionnaire (G7) (Submitted on 5/15/2025)  KASEY 7 TOTAL SCORE: 0

## 2025-05-15 NOTE — PATIENT INSTRUCTIONS
Patient Education   Preventive Care Advice   This is general advice given by our system to help you stay healthy. However, your care team may have specific advice just for you. Please talk to your care team about your preventive care needs.  Nutrition  Eat 5 or more servings of fruits and vegetables each day.  Try wheat bread, brown rice and whole grain pasta (instead of white bread, rice, and pasta).  Get enough calcium and vitamin D. Check the label on foods and aim for 100% of the RDA (recommended daily allowance).  Lifestyle  Exercise at least 150 minutes each week  (30 minutes a day, 5 days a week).  Do muscle strengthening activities 2 days a week. These help control your weight and prevent disease.  No smoking.  Wear sunscreen to prevent skin cancer.  Have a dental exam and cleaning every 6 months.  Yearly exams  See your health care team every year to talk about:  Any changes in your health.  Any medicines your care team has prescribed.  Preventive care, family planning, and ways to prevent chronic diseases.  Shots (vaccines)   HPV shots (up to age 26), if you've never had them before.  Hepatitis B shots (up to age 59), if you've never had them before.  COVID-19 shot: Get this shot when it's due.  Flu shot: Get a flu shot every year.  Tetanus shot: Get a tetanus shot every 10 years.  Pneumococcal, hepatitis A, and RSV shots: Ask your care team if you need these based on your risk.  Shingles shot (for age 50 and up)  General health tests  Diabetes screening:  Starting at age 35, Get screened for diabetes at least every 3 years.  If you are younger than age 35, ask your care team if you should be screened for diabetes.  Cholesterol test: At age 39, start having a cholesterol test every 5 years, or more often if advised.  Bone density scan (DEXA): At age 50, ask your care team if you should have this scan for osteoporosis (brittle bones).  Hepatitis C: Get tested at least once in your life.  STIs (sexually  transmitted infections)  Before age 24: Ask your care team if you should be screened for STIs.  After age 24: Get screened for STIs if you're at risk. You are at risk for STIs (including HIV) if:  You are sexually active with more than one person.  You don't use condoms every time.  You or a partner was diagnosed with a sexually transmitted infection.  If you are at risk for HIV, ask about PrEP medicine to prevent HIV.  Get tested for HIV at least once in your life, whether you are at risk for HIV or not.  Cancer screening tests  Cervical cancer screening: If you have a cervix, begin getting regular cervical cancer screening tests starting at age 21.  Breast cancer scan (mammogram): If you've ever had breasts, begin having regular mammograms starting at age 40. This is a scan to check for breast cancer.  Colon cancer screening: It is important to start screening for colon cancer at age 45.  Have a colonoscopy test every 10 years (or more often if you're at risk) Or, ask your provider about stool tests like a FIT test every year or Cologuard test every 3 years.  To learn more about your testing options, visit:   .  For help making a decision, visit:   https://bit.ly/re04238.  Prostate cancer screening test: If you have a prostate, ask your care team if a prostate cancer screening test (PSA) at age 55 is right for you.  Lung cancer screening: If you are a current or former smoker ages 50 to 80, ask your care team if ongoing lung cancer screenings are right for you.  For informational purposes only. Not to replace the advice of your health care provider. Copyright   2023 [facility]. All rights reserved. Clinically reviewed by the [facility]. datatracker [id number] - REV 01/24.  Preventing Falls: Care Instructions  Injuries and health problems such as trouble walking or poor eyesight can increase your risk of falling. So can some medicines. But there are things you can do to help  "prevent falls. You can exercise to get stronger. You can also arrange your home to make it safer.    Talk to your doctor about the medicines you take. Ask if any of them increase the risk of falls and whether they can be changed or stopped.   Try to exercise regularly. It can help improve your strength and balance. This can help lower your risk of falling.         Practice fall safety and prevention.   Wear low-heeled shoes that fit well and give your feet good support. Talk to your doctor if you have foot problems that make this hard.  Carry a cellphone or wear a medical alert device that you can use to call for help.  Use stepladders instead of chairs to reach high objects. Don't climb if you're at risk for falls. Ask for help, if needed.  Wear the correct eyeglasses, if you need them.        Make your home safer.   Remove rugs, cords, clutter, and furniture from walkways.  Keep your house well lit. Use night-lights in hallways and bathrooms.  Install and use sturdy handrails on stairways.  Wear nonskid footwear, even inside. Don't walk barefoot or in socks without shoes.        Be safe outside.   Use handrails, curb cuts, and ramps whenever possible.  Keep your hands free by using a shoulder bag or backpack.  Try to walk in well-lit areas. Watch out for uneven ground, changes in pavement, and debris.  Be careful in the winter. Walk on the grass or gravel when sidewalks are slippery. Use de-icer on steps and walkways. Add non-slip devices to shoes.    Put grab bars and nonskid mats in your shower or tub and near the toilet. Try to use a shower chair or bath bench when bathing.   Get into a tub or shower by putting in your weaker leg first. Get out with your strong side first. Have a phone or medical alert device in the bathroom with you.   Where can you learn more?  Go to https://www.[employer].net/patiented  Enter G117 in the search box to learn more about \"Preventing Falls: Care Instructions.\"  Current as of: " July 31, 2024  Content Version: 14.4    [id number] [employer].   Care instructions adapted under license by your healthcare professional. If you have questions about a medical condition or this instruction, always ask your healthcare professional. [employer] disclaims any warranty or liability for your use of this information.

## 2025-05-15 NOTE — NURSING NOTE
"Chief Complaint   Patient presents with    Medicare Visit       Initial /86   Pulse 78   Temp (!) 96.5  F (35.8  C) (Tympanic)   Resp 16   Ht 1.702 m (5' 7\")   Wt 84.1 kg (185 lb 6.4 oz)   SpO2 94%   BMI 29.04 kg/m   Estimated body mass index is 29.04 kg/m  as calculated from the following:    Height as of this encounter: 1.702 m (5' 7\").    Weight as of this encounter: 84.1 kg (185 lb 6.4 oz).  Medication Review: complete    The next two questions are to help us understand your food security.  If you are feeling you need any assistance in this area, we have resources available to support you today.          5/15/2025   SDOH- Food Insecurity   Within the past 12 months, did you worry that your food would run out before you got money to buy more? N   Within the past 12 months, did the food you bought just not last and you didn t have money to get more? N         Health Care Directive:  Patient does not have a Health Care Directive: Discussed advance care planning with patient; however, patient declined at this time.    Janette Tomlinson CMA    Immunization Documentation    Prior to Immunization administration, verified patients identity using patient's name and date of birth. Please see IMMUNIZATIONS  and order for additional information.  Patient / Parent instructed to remain in clinic for 15 minutes and report any adverse reaction to staff immediately.    Was the entire amount of vaccines given used? Yes    Janette Tomlinson CMA  5/15/2025   11:26 AM        "

## 2025-07-06 ENCOUNTER — MYC MEDICAL ADVICE (OUTPATIENT)
Dept: FAMILY MEDICINE | Facility: OTHER | Age: 65
End: 2025-07-06
Payer: COMMERCIAL

## 2025-07-06 DIAGNOSIS — E03.9 HYPOTHYROIDISM, UNSPECIFIED TYPE: ICD-10-CM

## 2025-07-07 RX ORDER — LEVOTHYROXINE SODIUM 25 UG/1
25 TABLET ORAL
Qty: 5 TABLET | Refills: 0 | Status: SHIPPED | OUTPATIENT
Start: 2025-07-07

## 2025-07-07 NOTE — TELEPHONE ENCOUNTER
Pt is out of FOCUS Train[facility]. Lab appt is scheduled for 7/10.     Owen'd up order for #5    Routing to provider to review and respond.  Jasmin Steele RN on 7/7/2025 at 7:44 AM

## 2025-07-15 ENCOUNTER — LAB (OUTPATIENT)
Dept: LAB | Facility: OTHER | Age: 65
End: 2025-07-15
Attending: NURSE PRACTITIONER
Payer: MEDICARE

## 2025-07-15 ENCOUNTER — TELEPHONE (OUTPATIENT)
Dept: FAMILY MEDICINE | Facility: OTHER | Age: 65
End: 2025-07-15

## 2025-07-15 DIAGNOSIS — E03.9 HYPOTHYROIDISM, UNSPECIFIED TYPE: ICD-10-CM

## 2025-07-15 LAB
T4 FREE SERPL-MCNC: 0.64 NG/DL (ref 0.9–1.7)
TSH SERPL DL<=0.005 MIU/L-ACNC: 9.83 UIU/ML (ref 0.3–4.2)

## 2025-07-15 PROCEDURE — 36415 COLL VENOUS BLD VENIPUNCTURE: CPT | Mod: ZL

## 2025-07-15 PROCEDURE — 84443 ASSAY THYROID STIM HORMONE: CPT | Mod: ZL

## 2025-07-15 PROCEDURE — 84439 ASSAY OF FREE THYROXINE: CPT | Mod: ZL

## 2025-07-15 NOTE — TELEPHONE ENCOUNTER
Janette Trinidad NP is requesting an appointment with this patient in regards to a follow up on thyroid and labs. Please schedule him next week with Janette Trinidad NP. Ok to use a PM appointment if needed (NOT 4 pm slot)     Janette Tomlinson CMA on 7/15/2025 at 11:47 AM

## 2025-07-22 ENCOUNTER — MYC REFILL (OUTPATIENT)
Dept: FAMILY MEDICINE | Facility: OTHER | Age: 65
End: 2025-07-22
Payer: COMMERCIAL

## 2025-07-22 DIAGNOSIS — E78.00 PURE HYPERCHOLESTEROLEMIA: ICD-10-CM

## 2025-07-22 RX ORDER — ATORVASTATIN CALCIUM 40 MG/1
40 TABLET, FILM COATED ORAL DAILY
Qty: 90 TABLET | Refills: 3 | OUTPATIENT
Start: 2025-07-22

## 2025-07-22 NOTE — TELEPHONE ENCOUNTER
[id number] sent Rx request for the following:      Requested Prescriptions   Pending Prescriptions Disp Refills    atorvastatin (LIPITOR) 40 MG tablet 90 tablet 3     Sig: Take 1 tablet (40 mg) by mouth daily.       Antihyperlipidemic agents Passed - 7/22/2025 12:38 PM     Last Prescription Date:   02/19/25  Last Fill Qty/Refills:         90, R-3    Last Office Visit:              05/15/25 ([facility])   Future Office visit:             Next 5 appointments (look out 90 days)      Jul 23, 2025 10:00 AM  (Arrive by 9:45 AM)  Provider Visit with Janette Trinidad NP  [facility] [address]  [place] [address] [phone]           Unable to complete prescription refill per RN Medication Refill Policy.   Refill too soon. Should have enough refills on hand until 02/19/26.  Jennifer Chadwick RN on 7/22/2025 at 12:41 PM

## 2025-07-23 ENCOUNTER — OFFICE VISIT (OUTPATIENT)
Dept: FAMILY MEDICINE | Facility: OTHER | Age: 65
End: 2025-07-23
Payer: MEDICARE

## 2025-07-23 VITALS
HEIGHT: 67 IN | OXYGEN SATURATION: 96 % | HEART RATE: 67 BPM | WEIGHT: 184.6 LBS | TEMPERATURE: 97.6 F | RESPIRATION RATE: 16 BRPM | DIASTOLIC BLOOD PRESSURE: 86 MMHG | SYSTOLIC BLOOD PRESSURE: 128 MMHG | BODY MASS INDEX: 28.97 KG/M2

## 2025-07-23 DIAGNOSIS — E03.9 HYPOTHYROIDISM, UNSPECIFIED TYPE: Primary | ICD-10-CM

## 2025-07-23 PROCEDURE — G0463 HOSPITAL OUTPT CLINIC VISIT: HCPCS

## 2025-07-23 RX ORDER — LEVOTHYROXINE SODIUM 50 UG/1
50 TABLET ORAL
Qty: 30 TABLET | Refills: 1 | Status: SHIPPED | OUTPATIENT
Start: 2025-07-23

## 2025-07-23 ASSESSMENT — ANXIETY QUESTIONNAIRES
GAD7 TOTAL SCORE: 0
7. FEELING AFRAID AS IF SOMETHING AWFUL MIGHT HAPPEN: NOT AT ALL
1. FEELING NERVOUS, ANXIOUS, OR ON EDGE: NOT AT ALL
2. NOT BEING ABLE TO STOP OR CONTROL WORRYING: NOT AT ALL
6. BECOMING EASILY ANNOYED OR IRRITABLE: NOT AT ALL
5. BEING SO RESTLESS THAT IT IS HARD TO SIT STILL: NOT AT ALL
GAD7 TOTAL SCORE: 0
4. TROUBLE RELAXING: NOT AT ALL
3. WORRYING TOO MUCH ABOUT DIFFERENT THINGS: NOT AT ALL
7. FEELING AFRAID AS IF SOMETHING AWFUL MIGHT HAPPEN: NOT AT ALL
GAD7 TOTAL SCORE: 0

## 2025-07-23 ASSESSMENT — PAIN SCALES - GENERAL: PAINLEVEL_OUTOF10: NO PAIN (0)

## 2025-07-23 NOTE — PATIENT INSTRUCTIONS
Because of the fact that your tsh level did not come down much and your free t4 level remains low after you had taken 4 weeks of synthroid; I opted to increase your dose to 50 mcg daily (from 25 mcg daily previously). It is important that your TSH level gets rechecked in 6 weeks. I have placed a future lab order for this to be done the week of 9/3/2025. I sent in 30 days plus 1 refill.     The recent TSH level is a bit skewed since there was misunderstanding and a period of not taking the levothyroxine.     Depending on what the lab draw in 6 weeks show, we will either keep you at the same dose or make appropriate dose adjustments.     Once the TSH level gets to a stable value, you will stay on your medication and we generally monitor the thyroid level annually unless symptoms present or change.

## 2025-07-23 NOTE — PROGRESS NOTES
Assessment & Plan   Problem List Items Addressed This Visit    None  Visit Diagnoses         Hypothyroidism, unspecified type    -  Primary    Relevant Medications    levothyroxine (SYNTHROID/LEVOTHROID) 50 MCG tablet    Other Relevant Orders    TSH Reflex [facility]           The longitudinal plan of care for the diagnosis(es)/condition(s) as documented were addressed during this visit. Due to the added complexity in care, I will continue to support Terry in the subsequent management and with ongoing continuity of care.    1. Hypothyroidism, unspecified type (Primary)  - levothyroxine (SYNTHROID/LEVOTHROID) 50 MCG tablet; Take 1 tablet (50 mcg) by mouth every morning (before breakfast).  Dispense: 30 tablet; Refill: 1  - TSH Reflex [facility]; Future  Terry's TSH level remained elevated with low free t4 after being on levothyroxine for 4 weeks. However, there was a lapse in treatment prior to the repeat lab draw so results are a bit skewed. However, we opted to increase to 50 mcg tablet daily starting today, and had clear discussion about taking this medication every day for 6 weeks and lab draw is due on 9/3. He scheduled an appointment prior to leaving the office. Discussed once we get TSH to a stable level; we can check annually and provide longer duration of medication. He is in agreement and understanding with today's plan. Discussed with Terry that treatment of hypothyroidism is generally lifelong.     Subjective   Terry is a 64 year old, presenting for the following health issues:  Thyroid Problem (Follow up )        7/23/2025     9:52 AM   Additional Questions   Roomed by FAB Savage   Accompanied by Self     History of Present Illness       Reason for visit:  Thyroid    He eats 2-3 servings of fruits and vegetables daily.He consumes 1 sweetened beverage(s) daily.He exercises with enough effort to increase his heart rate 20 to 29 minutes per day.  He exercises with enough effort to increase his heart rate 5 days per week.  "  He is taking medications regularly.        Medication Followup of Levothyroxine  Taking Medication as prescribed: NO  Side Effects:  None  Medication Helping Symptoms:  yes    Terry presents today to discuss recent diagnosis of hypothyroidism. He was diagnosed with this around 5/14/2025 when he saw me in an office visit due to stubborn weight. He endorses periods of fatigue but no other significant symptoms. He took 25 mcg daily of synthroid for 4 weeks and then unfortunately did not realize he needed to  another 30 day refill from the pharmacy. Therefore, he did not take his synthroid for 4 weeks. The TSH was redrawn on 7/15 and showed minimal decrease in TSH which was still elevated at 9.83 and free t4 was still low. He was tolerating the medicine and not feeling much change in symptoms or condition.     Review of Systems  Constitutional, neuro, ENT, endocrine, pulmonary, cardiac, gastrointestinal, genitourinary, musculoskeletal, integument and psychiatric systems are negative, except as otherwise noted.      Objective    /86   Pulse 67   Temp 97.6  F (36.4  C) (Temporal)   Resp 16   Ht 1.702 m (5' 7\")   Wt 83.7 kg (184 lb 9.6 oz)   SpO2 96%   BMI 28.91 kg/m    Body mass index is 28.91 kg/m .  Physical Exam  Vitals and nursing note reviewed.   Constitutional:       General: He is not in acute distress.     Appearance: Normal appearance. He is normal weight. He is not ill-appearing or toxic-appearing.   Cardiovascular:      Rate and Rhythm: Normal rate and regular rhythm.      Heart sounds: Normal heart sounds. No murmur heard.  Pulmonary:      Effort: Pulmonary effort is normal. No respiratory distress.      Breath sounds: Normal breath sounds. No wheezing.   Neurological:      General: No focal deficit present.      Mental Status: He is alert and oriented to person, place, and time.   Psychiatric:         Mood and Affect: Mood normal.         Behavior: Behavior normal.               Signed " Electronically by: Janette Trinidad, NP

## 2025-07-23 NOTE — NURSING NOTE
"Chief Complaint   Patient presents with    Thyroid Problem     Follow up        Initial /86   Pulse 67   Temp 97.6  F (36.4  C) (Temporal)   Resp 16   Ht 1.702 m (5' 7\")   Wt 83.7 kg (184 lb 9.6 oz)   SpO2 96%   BMI 28.91 kg/m   Estimated body mass index is 28.91 kg/m  as calculated from the following:    Height as of this encounter: 1.702 m (5' 7\").    Weight as of this encounter: 83.7 kg (184 lb 9.6 oz).  Medication Review: complete    The next two questions are to help us understand your food security.  If you are feeling you need any assistance in this area, we have resources available to support you today.          5/15/2025   SDOH- Food Insecurity   Within the past 12 months, did you worry that your food would run out before you got money to buy more? N   Within the past 12 months, did the food you bought just not last and you didn t have money to get more? N         Health Care Directive:  Patient does not have a Health Care Directive: Discussed advance care planning with patient; however, patient declined at this time.    Janette Tomlinson, FAB        "

## 2025-08-26 ENCOUNTER — MYC MEDICAL ADVICE (OUTPATIENT)
Dept: FAMILY MEDICINE | Facility: OTHER | Age: 65
End: 2025-08-26
Payer: COMMERCIAL

## 2025-08-26 DIAGNOSIS — E03.9 HYPOTHYROIDISM, UNSPECIFIED TYPE: ICD-10-CM

## 2025-08-26 RX ORDER — LEVOTHYROXINE SODIUM 50 UG/1
50 TABLET ORAL
Qty: 5 TABLET | Refills: 0 | Status: SHIPPED | OUTPATIENT
Start: 2025-08-26

## 2025-09-03 ENCOUNTER — LAB (OUTPATIENT)
Dept: LAB | Facility: OTHER | Age: 65
End: 2025-09-03
Attending: FAMILY MEDICINE
Payer: MEDICARE

## 2025-09-03 DIAGNOSIS — E03.9 HYPOTHYROIDISM, UNSPECIFIED TYPE: ICD-10-CM

## 2025-09-03 DIAGNOSIS — E03.9 HYPOTHYROIDISM, UNSPECIFIED TYPE: Primary | ICD-10-CM

## 2025-09-03 LAB
T4 FREE SERPL-MCNC: 1.3 NG/DL (ref 0.9–1.7)
TSH SERPL DL<=0.005 MIU/L-ACNC: 0.08 UIU/ML (ref 0.3–4.2)

## 2025-09-03 PROCEDURE — 84443 ASSAY THYROID STIM HORMONE: CPT | Mod: ZL

## 2025-09-03 PROCEDURE — 84439 ASSAY OF FREE THYROXINE: CPT | Mod: ZL

## 2025-09-03 PROCEDURE — 36415 COLL VENOUS BLD VENIPUNCTURE: CPT | Mod: ZL

## 2025-09-03 RX ORDER — LEVOTHYROXINE SODIUM 25 UG/1
25 TABLET ORAL
Qty: 30 TABLET | Refills: 1 | Status: SHIPPED | OUTPATIENT
Start: 2025-09-03

## (undated) DEVICE — ENDO KIT COMPLIANCE DYKENDOCMPLY

## (undated) DEVICE — Device

## (undated) DEVICE — SOL WATER 1500ML

## (undated) DEVICE — ENDO BITE BLOCK 60 MAXI LF 00712804

## (undated) DEVICE — SYR 50ML LL W/O NDL 309653

## (undated) DEVICE — SUCTION MANIFOLD NEPTUNE 2 SYS 4 PORT 0702-020-000

## (undated) DEVICE — TUBING SUCTION 10'X3/16" N510

## (undated) DEVICE — ENDO FORCEP ENDOJAW BIOPSY 2.8MMX230CM FB-220U

## (undated) RX ORDER — BUPIVACAINE HYDROCHLORIDE 5 MG/ML
INJECTION, SOLUTION EPIDURAL; INTRACAUDAL
Status: DISPENSED
Start: 2023-03-20

## (undated) RX ORDER — PROPOFOL 10 MG/ML
INJECTION, EMULSION INTRAVENOUS
Status: DISPENSED
Start: 2020-07-23

## (undated) RX ORDER — TRIAMCINOLONE ACETONIDE 40 MG/ML
INJECTION, SUSPENSION INTRA-ARTICULAR; INTRAMUSCULAR
Status: DISPENSED
Start: 2022-11-14

## (undated) RX ORDER — TRIAMCINOLONE ACETONIDE 40 MG/ML
INJECTION, SUSPENSION INTRA-ARTICULAR; INTRAMUSCULAR
Status: DISPENSED
Start: 2025-04-10

## (undated) RX ORDER — ONDANSETRON 2 MG/ML
INJECTION INTRAMUSCULAR; INTRAVENOUS
Status: DISPENSED
Start: 2020-07-23

## (undated) RX ORDER — BUPIVACAINE HYDROCHLORIDE 5 MG/ML
INJECTION, SOLUTION EPIDURAL; INTRACAUDAL
Status: DISPENSED
Start: 2022-11-14

## (undated) RX ORDER — DEXAMETHASONE SODIUM PHOSPHATE 4 MG/ML
INJECTION, SOLUTION INTRA-ARTICULAR; INTRALESIONAL; INTRAMUSCULAR; INTRAVENOUS; SOFT TISSUE
Status: DISPENSED
Start: 2025-04-10

## (undated) RX ORDER — TRIAMCINOLONE ACETONIDE 40 MG/ML
INJECTION, SUSPENSION INTRA-ARTICULAR; INTRAMUSCULAR
Status: DISPENSED
Start: 2024-09-04

## (undated) RX ORDER — LIDOCAINE HYDROCHLORIDE 10 MG/ML
INJECTION, SOLUTION INFILTRATION; PERINEURAL
Status: DISPENSED
Start: 2023-03-20

## (undated) RX ORDER — BUPIVACAINE HYDROCHLORIDE 5 MG/ML
INJECTION, SOLUTION EPIDURAL; INTRACAUDAL; PERINEURAL
Status: DISPENSED
Start: 2025-04-10

## (undated) RX ORDER — BUPIVACAINE HYDROCHLORIDE 5 MG/ML
INJECTION, SOLUTION EPIDURAL; INTRACAUDAL
Status: DISPENSED
Start: 2024-09-04

## (undated) RX ORDER — TRIAMCINOLONE ACETONIDE 40 MG/ML
INJECTION, SUSPENSION INTRA-ARTICULAR; INTRAMUSCULAR
Status: DISPENSED
Start: 2023-03-20

## (undated) RX ORDER — LIDOCAINE HYDROCHLORIDE 20 MG/ML
INJECTION, SOLUTION EPIDURAL; INFILTRATION; INTRACAUDAL; PERINEURAL
Status: DISPENSED
Start: 2020-07-23

## (undated) RX ORDER — BUPIVACAINE HYDROCHLORIDE 5 MG/ML
INJECTION, SOLUTION EPIDURAL; INTRACAUDAL
Status: DISPENSED
Start: 2021-04-19

## (undated) RX ORDER — LIDOCAINE HYDROCHLORIDE 10 MG/ML
INJECTION, SOLUTION INFILTRATION; PERINEURAL
Status: DISPENSED
Start: 2024-09-04

## (undated) RX ORDER — LIDOCAINE HYDROCHLORIDE 10 MG/ML
INJECTION, SOLUTION INFILTRATION; PERINEURAL
Status: DISPENSED
Start: 2021-04-19

## (undated) RX ORDER — LIDOCAINE HYDROCHLORIDE 10 MG/ML
INJECTION, SOLUTION INFILTRATION; PERINEURAL
Status: DISPENSED
Start: 2025-04-10

## (undated) RX ORDER — LIDOCAINE HYDROCHLORIDE 10 MG/ML
INJECTION, SOLUTION INFILTRATION; PERINEURAL
Status: DISPENSED
Start: 2022-11-14

## (undated) RX ORDER — TRIAMCINOLONE ACETONIDE 40 MG/ML
INJECTION, SUSPENSION INTRA-ARTICULAR; INTRAMUSCULAR
Status: DISPENSED
Start: 2021-04-19